# Patient Record
Sex: MALE | Race: BLACK OR AFRICAN AMERICAN | NOT HISPANIC OR LATINO | Employment: OTHER | URBAN - METROPOLITAN AREA
[De-identification: names, ages, dates, MRNs, and addresses within clinical notes are randomized per-mention and may not be internally consistent; named-entity substitution may affect disease eponyms.]

---

## 2018-07-11 LAB — CRC RECOMMENDATION EXT: NORMAL

## 2019-01-15 ENCOUNTER — CLINICAL SUPPORT (OUTPATIENT)
Dept: URGENT CARE | Facility: CLINIC | Age: 64
End: 2019-01-15
Payer: OTHER GOVERNMENT

## 2019-01-15 ENCOUNTER — OFFICE VISIT (OUTPATIENT)
Dept: URGENT CARE | Facility: CLINIC | Age: 64
End: 2019-01-15
Payer: OTHER GOVERNMENT

## 2019-01-15 VITALS
SYSTOLIC BLOOD PRESSURE: 138 MMHG | OXYGEN SATURATION: 100 % | WEIGHT: 169 LBS | DIASTOLIC BLOOD PRESSURE: 86 MMHG | HEIGHT: 68 IN | RESPIRATION RATE: 16 BRPM | TEMPERATURE: 98 F | HEART RATE: 67 BPM | BODY MASS INDEX: 25.61 KG/M2

## 2019-01-15 DIAGNOSIS — R11.0 NAUSEA: Primary | ICD-10-CM

## 2019-01-15 DIAGNOSIS — Z23 INFLUENZA VACCINATION GIVEN: ICD-10-CM

## 2019-01-15 PROCEDURE — 99204 PR OFFICE/OUTPT VISIT, NEW, LEVL IV, 45-59 MIN: ICD-10-PCS | Mod: S$GLB,,, | Performed by: NURSE PRACTITIONER

## 2019-01-15 PROCEDURE — 90471 IMMUNIZATION ADMIN: CPT | Mod: S$GLB,,, | Performed by: NURSE PRACTITIONER

## 2019-01-15 PROCEDURE — 90471 PR IMMUNIZ ADMIN,1 SINGLE/COMB VAC/TOXOID: ICD-10-PCS | Mod: S$GLB,,, | Performed by: NURSE PRACTITIONER

## 2019-01-15 PROCEDURE — 90658 IIV3 VACCINE SPLT 0.5 ML IM: CPT | Mod: S$GLB,,, | Performed by: NURSE PRACTITIONER

## 2019-01-15 PROCEDURE — 90658 PR FLU VACCINE, >3 YRS, IM: ICD-10-PCS | Mod: S$GLB,,, | Performed by: NURSE PRACTITIONER

## 2019-01-15 PROCEDURE — 99204 OFFICE O/P NEW MOD 45 MIN: CPT | Mod: S$GLB,,, | Performed by: NURSE PRACTITIONER

## 2019-01-15 RX ORDER — ONDANSETRON 4 MG/1
4 TABLET, ORALLY DISINTEGRATING ORAL EVERY 8 HOURS PRN
Qty: 15 TABLET | Refills: 0 | Status: SHIPPED | OUTPATIENT
Start: 2019-01-15 | End: 2022-01-12 | Stop reason: ALTCHOICE

## 2019-01-15 RX ORDER — OMEPRAZOLE 20 MG/1
20 CAPSULE, DELAYED RELEASE ORAL DAILY
Qty: 30 CAPSULE | Refills: 1 | Status: SHIPPED | OUTPATIENT
Start: 2019-01-15 | End: 2022-01-12 | Stop reason: ALTCHOICE

## 2019-01-15 NOTE — PROGRESS NOTES
"Subjective:       Patient ID: Dustin Owens is a 63 y.o. male.    Vitals:  height is 5' 8" (1.727 m) and weight is 76.7 kg (169 lb). His oral temperature is 97.7 °F (36.5 °C). His blood pressure is 138/86 and his pulse is 67. His respiration is 16 and oxygen saturation is 100%.     Chief Complaint: Nausea    Dustin Owens is a 63 year old male presenting with a one month history of nausea that only occurs after eating. He denies abdominal pain. He has had no vomiting. He has taken no medications for his symptoms. He notices the nausea mostly with fried/fatty foods or when he has eaten after a long fast. He does not report a chronic history of NSAID use.       Nausea   This is a new problem. Associated symptoms include nausea. Pertinent negatives include no abdominal pain, chills, fever or vomiting. He has tried nothing for the symptoms.       Constitution: Negative for chills and fever.   HENT: Negative.    Neck: negative.   Cardiovascular: Negative.    Eyes: Negative.    Respiratory: Negative.    Gastrointestinal: Positive for nausea. Negative for abdominal pain, abdominal bloating, vomiting, diarrhea and heartburn.   Endocrine: negative.   Genitourinary: Negative.    Musculoskeletal: Negative.    Skin: Negative.        Objective:      Physical Exam   Constitutional: He is oriented to person, place, and time. He appears well-developed and well-nourished. He is cooperative.  Non-toxic appearance. He does not appear ill. No distress.   HENT:   Head: Normocephalic and atraumatic.   Right Ear: Hearing, tympanic membrane, external ear and ear canal normal.   Left Ear: Hearing, tympanic membrane, external ear and ear canal normal.   Nose: Nose normal. No mucosal edema, rhinorrhea or nasal deformity. No epistaxis. Right sinus exhibits no maxillary sinus tenderness and no frontal sinus tenderness. Left sinus exhibits no maxillary sinus tenderness and no frontal sinus tenderness.   Mouth/Throat: Uvula is midline, " oropharynx is clear and moist and mucous membranes are normal. No trismus in the jaw. Normal dentition. No uvula swelling. No posterior oropharyngeal erythema.   Eyes: Conjunctivae and lids are normal. Right eye exhibits no discharge. Left eye exhibits no discharge. No scleral icterus.   Sclera clear bilat   Neck: Trachea normal, normal range of motion, full passive range of motion without pain and phonation normal. Neck supple.   Cardiovascular: Normal rate, regular rhythm, normal heart sounds, intact distal pulses and normal pulses.   Pulmonary/Chest: Effort normal and breath sounds normal. No respiratory distress.   Abdominal: Soft. Normal appearance and bowel sounds are normal. He exhibits no distension, no pulsatile midline mass and no mass. There is no tenderness. There is no guarding.   Musculoskeletal: Normal range of motion. He exhibits no edema or deformity.   Neurological: He is alert and oriented to person, place, and time. He exhibits normal muscle tone. Coordination normal.   Skin: Skin is warm, dry and intact. He is not diaphoretic. No pallor.   Psychiatric: He has a normal mood and affect. His speech is normal and behavior is normal. Judgment and thought content normal. Cognition and memory are normal.   Nursing note and vitals reviewed.      Assessment:       1. Nausea        Plan:       Dustin Owens is a 63 year old male presenting to the clinic with a several week history of intermittent nausea, only occurring after eating. He has had no abdominal tenderness and has none on exam. No suspicion for choleducolithiasis, acute cholecystitis, pancreatitis,SBO, or other emergent cause. He will be started on PPI therapy for possible GERD and referred to GI for additional evaluation. Referral placed and patient provided with clinic phone number. No need for emergent imaging at this time. Based on my clinical evaluation, I do not appreciate any immediate, emergent, or life threatening condition or  etiology that warrants additional workup today and feel that the patient can be discharged with close follow up care.     Nausea  -     Ambulatory referral to Gastroenterology    Other orders  -     omeprazole (PRILOSEC) 20 MG capsule; Take 1 capsule (20 mg total) by mouth once daily.  Dispense: 30 capsule; Refill: 1  -     ondansetron (ZOFRAN ODT) 4 MG TbDL; Take 1 tablet (4 mg total) by mouth every 8 (eight) hours as needed.  Dispense: 15 tablet; Refill: 0

## 2019-01-15 NOTE — PATIENT INSTRUCTIONS
Call [phone] and request an appointment with gastroenterology. I have put a referral in the system for you.       Tips to Control Acid Reflux    To control acid reflux, youll need to make some basic diet and lifestyle changes. The simple steps outlined below may be all youll need to ease discomfort.  Watch what you eat  · Avoid fatty foods and spicy foods.  · Eat fewer acidic foods, such as citrus and tomato-based foods. These can increase symptoms.  · Limit drinking alcohol, caffeine, and fizzy beverages. All increase acid reflux.  · Try limiting chocolate, peppermint, and spearmint. These can worsen acid reflux in some people.  Watch when you eat  · Avoid lying down for 3 hours after eating.  · Do not snack before going to bed.  Raise your head  Raising your head and upper body by 4 to 6 inches helps limit reflux when youre lying down. Put blocks under the head of your bed frame to raise it.  Other changes  · Lose weight, if you need to  · Dont exercise near bedtime  · Avoid tight-fitting clothes  · Limit aspirin and ibuprofen  · Stop smoking   Date Last Reviewed: 7/1/2016  © 9113-7897 [employer]. [address] [place] [address], [place] [address]. All rights reserved. This information is not intended as a substitute for professional medical care. Always follow your healthcare professional's instructions.        GERD (Adult)    The esophagus is a tube that carries food from the mouth to the stomach. A valve at the lower end of the esophagus prevents stomach acid from flowing upward. When this valve doesn't work properly, stomach contents may repeatedly flow back up (reflux) into the esophagus. This is called gastroesophageal reflux disease (GERD). GERD can irritate the esophagus. It can cause problems with swallowing or breathing. In severe cases, GERD can cause recurrent pneumonia or other serious problems.  Symptoms of reflux include burning, pressure or sharp pain in the upper abdomen or mid to  "lower chest. The pain can spread to the neck, back, or shoulder. There may be belching, an acid taste in the back of the throat, chronic cough, or sore throat or hoarseness. GERD symptoms often occur during the day after a big meal. They can also occur at night when lying down.   Home care  Lifestyle changes can help reduce symptoms. If needed, medicines may be prescribed. Symptoms often improve with treatment, but if treatment is stopped, the symptoms often return after a few months. So most persons with GERD will need to continue treatment.  Lifestyle changes  · Limit or avoid fatty, fried, and spicy foods, as well as coffee, chocolate, mint, and foods with high acid content such as tomatoes and citrus fruit and juices (orange, grapefruit, lemon).  · Dont eat large meals, especially at night. Frequent, smaller meals are best. Do not lie down right after eating. And dont eat anything 3 hours before going to bed.  · Avoid drinking alcohol and smoking. As much as possible, stay away from second hand smoke.  · If you are overweight, losing weight will reduce symptoms.   · Avoid wearing tight clothing around your stomach area.  · If your symptoms occur during sleep, use a foam wedge to elevate your upper body (not just your head.) Or, place 4" blocks under the head of your bed.  Medicines  If needed, medicines can help relieve the symptoms of GERD and prevent damage to the esophagus. Discuss a medicine plan with your healthcare provider. This may include one or more of the following medicines:  · Antacids to help neutralize the normal acids in your stomach.  · Acid blockers (H2 blockers) to decrease acid production.  · Acid inhibitors (PPIs) to decrease acid production in a different way than the blockers. They may work better, but can take a little longer to take effect.  Take an antacid 30-60 minutes after eating and at bedtime, but not at the same time as an acid blocker.  Try not to take medicines such as " ibuprofen and aspirin. If you are taking aspirin for your heart or other medical reasons, talk to your healthcare provider about stopping it.  Follow-up care  Follow up with your healthcare provider or as advised by our staff.  When to seek medical advice  Call your healthcare provider if any of the following occur:  · Stomach pain gets worse or moves to the lower right abdomen (appendix area)  · Chest pain appears or gets worse, or spreads to the back, neck, shoulder, or arm  · Frequent vomiting (cant keep down liquids)  · Blood in the stool or vomit (red or black in color)  · Feeling weak or dizzy  · Fever of 100.4ºF (38ºC) or higher, or as directed by your healthcare provider  Date Last Reviewed: 6/23/2015  © 6065-0653 [employer]. [address] [place] [address], [place] [address]. All rights reserved. This information is not intended as a substitute for professional medical care. Always follow your healthcare professional's instructions.

## 2019-08-27 ENCOUNTER — HOSPITAL ENCOUNTER (OUTPATIENT)
Dept: RADIOLOGY | Facility: HOSPITAL | Age: 64
Discharge: HOME | End: 2019-08-27
Attending: FAMILY MEDICINE
Payer: OTHER GOVERNMENT

## 2019-08-27 DIAGNOSIS — M54.2 NECK PAIN: ICD-10-CM

## 2019-08-27 DIAGNOSIS — M54.2 CERVICALGIA: Primary | ICD-10-CM

## 2019-08-27 DIAGNOSIS — M54.2 NECK PAIN: Primary | ICD-10-CM

## 2019-08-27 PROCEDURE — 72050 X-RAY EXAM NECK SPINE 4/5VWS: CPT | Mod: TC,PO

## 2019-12-27 ENCOUNTER — CLINICAL SUPPORT (OUTPATIENT)
Dept: URGENT CARE | Facility: CLINIC | Age: 64
End: 2019-12-27
Payer: OTHER GOVERNMENT

## 2019-12-27 VITALS
HEART RATE: 76 BPM | RESPIRATION RATE: 18 BRPM | WEIGHT: 167 LBS | SYSTOLIC BLOOD PRESSURE: 131 MMHG | TEMPERATURE: 97 F | BODY MASS INDEX: 25.31 KG/M2 | DIASTOLIC BLOOD PRESSURE: 84 MMHG | HEIGHT: 68 IN | OXYGEN SATURATION: 99 %

## 2019-12-27 DIAGNOSIS — J01.90 ACUTE NON-RECURRENT SINUSITIS, UNSPECIFIED LOCATION: Primary | ICD-10-CM

## 2019-12-27 DIAGNOSIS — J40 BRONCHITIS: ICD-10-CM

## 2019-12-27 PROCEDURE — 99204 OFFICE O/P NEW MOD 45 MIN: CPT | Mod: 25,S$GLB,, | Performed by: NURSE PRACTITIONER

## 2019-12-27 PROCEDURE — 96372 THER/PROPH/DIAG INJ SC/IM: CPT | Mod: S$GLB,,, | Performed by: NURSE PRACTITIONER

## 2019-12-27 PROCEDURE — 99204 PR OFFICE/OUTPT VISIT, NEW, LEVL IV, 45-59 MIN: ICD-10-PCS | Mod: 25,S$GLB,, | Performed by: NURSE PRACTITIONER

## 2019-12-27 PROCEDURE — 96372 PR INJECTION,THERAP/PROPH/DIAG2ST, IM OR SUBCUT: ICD-10-PCS | Mod: S$GLB,,, | Performed by: NURSE PRACTITIONER

## 2019-12-27 RX ORDER — DEXAMETHASONE SODIUM PHOSPHATE 4 MG/ML
8 INJECTION, SOLUTION INTRA-ARTICULAR; INTRALESIONAL; INTRAMUSCULAR; INTRAVENOUS; SOFT TISSUE
Status: COMPLETED | OUTPATIENT
Start: 2019-12-27 | End: 2019-12-27

## 2019-12-27 RX ORDER — CETIRIZINE HYDROCHLORIDE 10 MG/1
10 TABLET ORAL DAILY
Qty: 30 TABLET | Refills: 0 | Status: SHIPPED | OUTPATIENT
Start: 2019-12-27 | End: 2020-01-26

## 2019-12-27 RX ORDER — PREDNISONE 20 MG/1
40 TABLET ORAL DAILY
Qty: 10 TABLET | Refills: 0 | Status: SHIPPED | OUTPATIENT
Start: 2019-12-27 | End: 2020-01-01

## 2019-12-27 RX ORDER — FLUTICASONE PROPIONATE 50 MCG
2 SPRAY, SUSPENSION (ML) NASAL DAILY
Qty: 15.8 ML | Refills: 0 | Status: SHIPPED | OUTPATIENT
Start: 2019-12-27 | End: 2021-07-28

## 2019-12-27 RX ORDER — AMOXICILLIN 875 MG/1
875 TABLET, FILM COATED ORAL 2 TIMES DAILY
Qty: 14 TABLET | Refills: 0 | Status: SHIPPED | OUTPATIENT
Start: 2019-12-27 | End: 2020-01-03

## 2019-12-27 RX ADMIN — DEXAMETHASONE SODIUM PHOSPHATE 8 MG: 4 INJECTION, SOLUTION INTRA-ARTICULAR; INTRALESIONAL; INTRAMUSCULAR; INTRAVENOUS; SOFT TISSUE at 03:12

## 2019-12-27 NOTE — PATIENT INSTRUCTIONS
What Is Acute Bronchitis?  Acute bronchitis is when the airways in your lungs (bronchial tubes) become red and swollen (inflamed). It is usually caused by a viral infection. But it can also occur because of a bacteria or allergen. Symptoms include a cough that produces yellow or greenish mucus and can last for days or sometimes weeks.  Inside healthy lungs    Air travels in and out of the lungs through the airways. The linings of these airways produce sticky mucus. This mucus traps particles that enter the lungs. Tiny structures called cilia then sweep the particles out of the airways.     Healthy airway: Airways are normally open. Air moves in and out easily.      Healthy cilia: Tiny, hairlike cilia sweep mucus and particles up and out of the airways.   Lungs with bronchitis  Bronchitis often occurs with a cold or the flu virus. The airways become inflamed (red and swollen). There is a deep hacking cough from the extra mucus. Other symptoms may include:  · Wheezing  · Chest discomfort  · Shortness of breath  · Mild fever  A second infection, this time due to bacteria, may then occur. And airways irritated by allergens or smoke are more likely to get infected.        Inflamed airway: Inflammation and extra mucus narrow the airway, causing shortness of breath.      Impaired cilia: Extra mucus impairs cilia, causing congestion and wheezing. Smoking makes the problem worse.   Making a diagnosis  A physical exam, health history, and certain tests help your healthcare provider make the diagnosis.  Health history  Your healthcare provider will ask you about your symptoms.  The exam  Your provider listens to your chest for signs of congestion. He or she may also check your ears, nose, and throat.  Possible tests  · A sputum test for bacteria. This requires a sample of mucus from your lungs.  · A nasal or throat swab. This tests to see if you have a bacterial infection.  · A chest X-ray. This is done if your healthcare  provider thinks you have pneumonia.  · Tests to check for an underlying condition. Other tests may be done to check for things such as allergies, asthma, or COPD (chronic obstructive pulmonary disease). You may need to see a specialist for more lung function testing.  Treating a cough  The main treatment for bronchitis is easing symptoms. Avoiding smoke, allergens, and other things that trigger coughing can often help. If the infection is bacterial, you may be given antibiotics. During the illness, it's important to get plenty of sleep. To ease symptoms:  · Dont smoke. Also avoid secondhand smoke.  · Use a humidifier. Or try breathing in steam from a hot shower. This may help loosen mucus.  · Drink a lot of water and juice. They can soothe the throat and may help thin mucus.  · Sit up or use extra pillows when in bed. This helps to lessen coughing and congestion.  · Ask your provider about using medicine. Ask about using cough medicine, pain and fever medicine, or a decongestant.  Antibiotics  Most cases of bronchitis are caused by cold or flu viruses. They dont need antibiotics to treat them, even if your mucus is thick and green or yellow. Antibiotics dont treat viral illness and antibiotics have not been shown to have any benefit in cases of acute bronchitis. Taking antibiotics when they are not needed increases your risk of getting an infection later that is antibiotic-resistant. Antibiotics can also cause severe cases of diarrhea that require other antibiotics to treat.  It is important that you accept your healthcare provider's opinion to not use antibiotics. Your provider will prescribe antibiotics if the infection is caused by bacteria. If they are prescribed:  · Take all of the medicine. Take the medicine until it is used up, even if symptoms have improved. If you dont, the bronchitis may come back.  · Take the medicines as directed. For instance, some medicines should be taken with food.  · Ask about  side effects. Ask your provider or pharmacist what side effects are common, and what to do about them.  Follow-up care  You should see your provider again in 2 to 3 weeks. By this time, symptoms should have improved. An infection that lasts longer may mean you have a more serious problem.  Prevention  · Avoid tobacco smoke. If you smoke, quit. Stay away from smoky places. Ask friends and family not to smoke around you, or in your home or car.  · Get checked for allergies.  · Ask your provider about getting a yearly flu shot. Also ask about pneumococcal or pneumonia shots.  · Wash your hands often. This helps reduce the chance of picking up viruses that cause colds and flu.  Call your healthcare provider if:  · Symptoms worsen, or you have new symptoms  · Breathing problems worsen or  become severe  · Symptoms dont get better within a week, or within 3 days of taking antibiotics   Date Last Reviewed: 2/1/2017  © 4514-9317 [employer]. [address] [place] [address]. All rights reserved. This information is not intended as a substitute for professional medical care. Always follow your healthcare professional's instructions.        Acute Sinusitis    Acute sinusitis is irritation and swelling of the sinuses. It is usually caused by a viral infection after a common cold. Your doctor can help you find relief.  What is acute sinusitis?  Sinuses are air-filled spaces in the skull behind the face. They are kept moist and clean by a lining of mucosa. Things such as pollen, smoke, and chemical fumes can irritate the mucosa. It can then swell up. As a response to irritation, the mucosa makes more mucus and other fluids. Tiny hairlike cilia cover the mucosa. Cilia help carry mucus toward the opening of the sinus. Too much mucus may cause the cilia to stop working. This blocks the sinus opening. A buildup of fluid in the sinuses then causes pain and pressure. It can also encourage bacteria to grow in  the sinuses.  Common symptoms of acute sinusitis  You may have:  · Facial soreness pain  · Headache  · Fever  · Fluid draining in the back of the throat (postnasal drip)  · Congestion  · Drainage that is thick and colored, instead of clear  · Cough  Diagnosing acute sinusitis  Your doctor will ask about your symptoms and health history. He or she will look at your ear, nose, and throat. You usually won't need to have X-rays taken.    The doctor may take a sample of mucus to check for bacteria. If you have sinusitis that keeps coming back, you may need imaging tests such as X-rays or CAT scans. This will help your doctor check for a structural problem that may be causing the infection.  Treating acute sinusitis  Treatment is aimed at unblocking the sinus opening and helping the cilia work again. You may need to take antihistamine and decongestant medicine. These can reduce inflammation and decrease the amount of fluid your sinuses make. If you have a bacterial infection, you will need to take antibiotic medicine for 10 to 14 days. Take this medicine until it is gone, even if you feel better.  Date Last Reviewed: 10/1/2016  © 1222-7310 [employer]. [address] [place], [place] [id number]. All rights reserved. This information is not intended as a substitute for professional medical care. Always follow your healthcare professional's instructions.        Self-Care for Sinusitis     Drinking plenty of water can help sinuses drain.   Sinusitis can often be managed with self-care. Self-care can keep sinuses moist and make you feel more comfortable. Remember to follow your doctor's instructions closely. This can make a big difference in getting your sinus problem under control.  Drink fluids  Drinking extra fluids helps thin your mucus. This lets it drain from your sinuses more easily. Have a glass of water every hour or two. A humidifier helps in much the same way. Fluids can also offset the drying effects  of certain medicines. If you use a humidifier, follow the product maker's instructions on how to use it. Clean it on a regular schedule.  Use saltwater rinses  Rinses help keep your sinuses and nose moist. Mix a teaspoon of salt in 8 ounces of fresh, warm water. Use a bulb syringe to gently squirt the water into your nose a few times a day. You can also buy ready-made saline nasal sprays.  Apply hot or cold packs  Applying heat to the area surrounding your sinuses may make you feel more comfortable. Use a hot water bottle or a hand towel dipped in hot water. Some people also find ice packs effective for relieving pain.  Medicines  Your doctor may prescribe medications to help treat your sinusitis. If you have an infection, antibiotics can help clear it up. If you are prescribed antibiotics, take all pills on schedule until they are gone, even if you feel better. Decongestants help relieve swelling. Use decongestant sprays for short periods only under the direction of your doctor. If you have allergies, your doctor may prescribe medications to help relieve them.   Date Last Reviewed: 10/1/2016  © 7553-7047 [employer]. [address] [place] [address], [place] [address]. All rights reserved. This information is not intended as a substitute for professional medical care. Always follow your healthcare professional's instructions.

## 2019-12-27 NOTE — PROGRESS NOTES
"Subjective:       Patient ID: Dustin Owens is a 64 y.o. male.    Vitals:  height is 5' 8" (1.727 m) and weight is 75.8 kg (167 lb). His oral temperature is 97.4 °F (36.3 °C). His blood pressure is 131/84 and his pulse is 76. His respiration is 18 and oxygen saturation is 99%.     Chief Complaint: Sinus Problem (X 2 weeks)    Patient complains of sinus congestion, facial pressure, and cough for 2 weeks. Denies fever, sob, chest pain, nausea, vomiting, diarrhea.     Sinus Problem   This is a new problem. The current episode started 1 to 4 weeks ago. The problem has been gradually worsening since onset. There has been no fever. Associated symptoms include congestion, coughing, headaches and sinus pressure. Pertinent negatives include no chills, shortness of breath or sore throat. Past treatments include nothing. The treatment provided no relief.       Constitution: Negative for chills, fatigue and fever.   HENT: Positive for congestion and sinus pressure. Negative for sore throat.    Neck: Negative for painful lymph nodes.   Cardiovascular: Negative for chest pain and leg swelling.   Eyes: Negative for double vision and blurred vision.   Respiratory: Positive for cough. Negative for shortness of breath.    Gastrointestinal: Negative for abdominal pain, nausea, vomiting and diarrhea.   Endocrine: negative.   Genitourinary: Negative for dysuria, frequency and urgency.   Musculoskeletal: Negative for joint pain, joint swelling, muscle cramps and muscle ache.   Skin: Negative for color change, pale and rash.   Allergic/Immunologic: Negative for seasonal allergies.   Neurological: Positive for headaches. Negative for dizziness, history of vertigo, light-headedness and passing out.   Hematologic/Lymphatic: Negative for swollen lymph nodes, easy bruising/bleeding and history of blood clots. Does not bruise/bleed easily.   Psychiatric/Behavioral: Negative for nervous/anxious, sleep disturbance and depression. The patient " is not nervous/anxious.        Objective:      Physical Exam   Constitutional: He is oriented to person, place, and time. Vital signs are normal. He appears well-developed and well-nourished. He is cooperative.  Non-toxic appearance. He does not have a sickly appearance. He does not appear ill. No distress.   HENT:   Head: Normocephalic and atraumatic.   Right Ear: Hearing, tympanic membrane, external ear and ear canal normal.   Left Ear: Hearing, tympanic membrane, external ear and ear canal normal.   Nose: Mucosal edema and rhinorrhea present. No nasal deformity. No epistaxis. Right sinus exhibits maxillary sinus tenderness and frontal sinus tenderness. Left sinus exhibits maxillary sinus tenderness and frontal sinus tenderness.   Mouth/Throat: Uvula is midline and mucous membranes are normal. No trismus in the jaw. Normal dentition. No uvula swelling. Posterior oropharyngeal erythema present. No oropharyngeal exudate or posterior oropharyngeal edema.   Eyes: Conjunctivae and lids are normal. Right eye exhibits no discharge. Left eye exhibits no discharge. No scleral icterus.   Neck: Trachea normal, normal range of motion, full passive range of motion without pain and phonation normal. Neck supple.   Cardiovascular: Normal rate, regular rhythm, normal heart sounds, intact distal pulses and normal pulses.   Pulmonary/Chest: Effort normal and breath sounds normal. No respiratory distress.   Abdominal: Soft. Normal appearance and bowel sounds are normal. He exhibits no distension, no pulsatile midline mass and no mass. There is no tenderness.   Musculoskeletal: Normal range of motion. He exhibits no edema or deformity.   Lymphadenopathy:     He has no cervical adenopathy.   Neurological: He is alert and oriented to person, place, and time. He exhibits normal muscle tone. Coordination normal. GCS eye subscore is 4. GCS verbal subscore is 5. GCS motor subscore is 6.   Skin: Skin is warm, dry, intact, not diaphoretic  and not pale.   Psychiatric: He has a normal mood and affect. His speech is normal and behavior is normal. Judgment and thought content normal. Cognition and memory are normal.   Nursing note and vitals reviewed.        Assessment:       1. Acute non-recurrent sinusitis, unspecified location    2. Bronchitis        Plan:       Due to patient presentation as well as length of symptoms, will treat for bacterial sinusitis. Patient is being discharged home. Discussed reasons to return and importance of followup. All questions addressed and patient given discharge instructions and followup information.    Lungs clear throughout, sats 99% , and no respiratory distress.  I do not suspect pneumonia, pulmonary embolism or any other more serious condition requiring further treatment. Advised pt to return to clinic or go to ER for any worsening of symptoms. Based on my clinical evaluation, I do not appreciate any immediate, emergent, or life threatening condition or etiology that warrants additional workup today and feel that the patient can be discharged with close follow up care.    Acute non-recurrent sinusitis, unspecified location    Bronchitis    Other orders  -     dexamethasone injection 8 mg  -     amoxicillin (AMOXIL) 875 MG tablet; Take 1 tablet (875 mg total) by mouth 2 (two) times daily. for 7 days  Dispense: 14 tablet; Refill: 0  -     cetirizine (ZYRTEC) 10 MG tablet; Take 1 tablet (10 mg total) by mouth once daily.  Dispense: 30 tablet; Refill: 0  -     fluticasone propionate (FLONASE) 50 mcg/actuation nasal spray; 2 sprays (100 mcg total) by Each Nostril route once daily.  Dispense: 15.8 mL; Refill: 0  -     predniSONE (DELTASONE) 20 MG tablet; Take 2 tablets (40 mg total) by mouth once daily. for 5 days  Dispense: 10 tablet; Refill: 0  -     dexchlorphen-phenylephrine-DM (POLYTUSSIN DM) 1-5-10 mg/5 mL Syrp; Take 5 mLs by mouth every 4 (four) hours as needed.  Dispense: 120 mL; Refill: 0

## 2020-01-05 ENCOUNTER — HOSPITAL ENCOUNTER (EMERGENCY)
Facility: HOSPITAL | Age: 65
Discharge: HOME | End: 2020-01-05
Attending: EMERGENCY MEDICINE
Payer: OTHER GOVERNMENT

## 2020-01-05 VITALS
SYSTOLIC BLOOD PRESSURE: 126 MMHG | OXYGEN SATURATION: 98 % | DIASTOLIC BLOOD PRESSURE: 62 MMHG | WEIGHT: 168 LBS | HEART RATE: 80 BPM | TEMPERATURE: 98 F | BODY MASS INDEX: 24.88 KG/M2 | HEIGHT: 69 IN | RESPIRATION RATE: 18 BRPM

## 2020-01-05 DIAGNOSIS — R51.9 ACUTE NONINTRACTABLE HEADACHE, UNSPECIFIED HEADACHE TYPE: Primary | ICD-10-CM

## 2020-01-05 DIAGNOSIS — M54.2 NECK PAIN: ICD-10-CM

## 2020-01-05 PROCEDURE — 96372 THER/PROPH/DIAG INJ SC/IM: CPT

## 2020-01-05 PROCEDURE — 63600175 PHARM REV CODE 636 W HCPCS: Performed by: EMERGENCY MEDICINE

## 2020-01-05 PROCEDURE — 99284 EMERGENCY DEPT VISIT MOD MDM: CPT | Mod: 25

## 2020-01-05 RX ORDER — KETOROLAC TROMETHAMINE 30 MG/ML
30 INJECTION, SOLUTION INTRAMUSCULAR; INTRAVENOUS
Status: COMPLETED | OUTPATIENT
Start: 2020-01-05 | End: 2020-01-05

## 2020-01-05 RX ORDER — ORPHENADRINE CITRATE 30 MG/ML
30 INJECTION INTRAMUSCULAR; INTRAVENOUS
Status: COMPLETED | OUTPATIENT
Start: 2020-01-05 | End: 2020-01-05

## 2020-01-05 RX ADMIN — ORPHENADRINE CITRATE 30 MG: 30 INJECTION INTRAMUSCULAR; INTRAVENOUS at 10:01

## 2020-01-05 RX ADMIN — KETOROLAC TROMETHAMINE 30 MG: 30 INJECTION, SOLUTION INTRAMUSCULAR at 10:01

## 2020-01-05 NOTE — ED PROVIDER NOTES
Encounter Date: 1/5/2020    SCRIBE #1 NOTE: I, Yajaira Belle, am scribing for, and in the presence of, Ron Green MD.       History     Chief Complaint   Patient presents with    Headache       Time seen by provider: 9:59 AM on 01/05/2020    Dustin Owens is a 64 y.o. male with PMHx of asthma and HLD who presents to the ED with a gradual onset of HA and neck pain. Pt was treated 2 weeks ago for possible sinusitis with a course of Amoxicillin and steroids. Pt states he was laying on the couch when he woke up 4 days ago with HA and NP. HA has worsened with associated eye pain and NP occasionally radiates down right arm. He notes previous hx of neck problems. Additionally, he complains neck stiffness and sweats. He took Tylenol yesterday with little to no relief. The patient denies congestion, cough, fever, head injuries, joint pain or any other symptoms at this time. No PMHx of CA. No pertinent PSHx. NKDA.    The history is provided by the patient.     Review of patient's allergies indicates:  No Known Allergies  Past Medical History:   Diagnosis Date    Asthma      No past surgical history on file.  Family History   Problem Relation Age of Onset    Cancer Mother     Diabetes Father      Social History     Tobacco Use    Smoking status: Former Smoker   Substance Use Topics    Alcohol use: Not on file    Drug use: Not on file     Review of Systems   Constitutional: Positive for diaphoresis. Negative for fever.   HENT: Negative for congestion and sore throat.    Eyes: Positive for pain.   Respiratory: Negative for cough and shortness of breath.    Cardiovascular: Negative for chest pain.   Gastrointestinal: Negative for nausea.   Genitourinary: Negative for dysuria.   Musculoskeletal: Positive for myalgias (right arm), neck pain and neck stiffness. Negative for arthralgias, back pain and joint swelling.   Skin: Negative for rash.   Neurological: Positive for headaches. Negative for weakness.    Hematological: Does not bruise/bleed easily.       Physical Exam     Initial Vitals [01/05/20 0952]   BP Pulse Resp Temp SpO2   126/62 80 18 98.2 °F (36.8 °C) 98 %      MAP       --         Physical Exam    Nursing note and vitals reviewed.  Constitutional: He appears well-developed and well-nourished. He is not diaphoretic. No distress.   HENT:   Head: Normocephalic and atraumatic.   Mouth/Throat: Oropharynx is clear and moist.   No sinus tenderness to palpation.   Eyes: Conjunctivae are normal.   Neck: Neck supple.   Mild reproducible left posterior lateral tenderness extending through trapezius. Pain primarily when turning head to left.    Cardiovascular: Normal rate, regular rhythm, normal heart sounds and intact distal pulses. Exam reveals no gallop and no friction rub.    No murmur heard.  Pulmonary/Chest: Breath sounds normal. He has no wheezes. He has no rhonchi. He has no rales.   Abdominal: Soft. He exhibits no distension. There is no tenderness.   Musculoskeletal: Normal range of motion.        Cervical back: He exhibits no tenderness.   No midline C-spine tenderness.   Neurological: He is alert and oriented to person, place, and time. He has normal strength. No cranial nerve deficit.   No focal neurological deficits noted.  Cranial nerves II-XII grossly intact.  5/5 strength and sensation bilateral upper and lower extremities.    Skin: No rash noted. No erythema.         ED Course   Procedures  Labs Reviewed - No data to display       Imaging Results    None          Medical Decision Making:   History:   Old Medical Records: I decided to obtain old medical records.            Scribe Attestation:   Scribe #1: I performed the above scribed service and the documentation accurately describes the services I performed. I attest to the accuracy of the note.    I, Dr. Ron Green, personally performed the services described in this documentation. All medical record entries made by the scribe were at my  direction and in my presence.  I have reviewed the chart and agree that the record reflects my personal performance and is accurate and complete. Ron Green MD.  1:45 PM 01/05/2020    Dustin Owens is a 64 y.o. male presenting with multiple complaints including 4 days of left-sided neck pain accompanied by headache. I doubt major vessel dissection.  The patient has a nonfocal neurological examination with no red flags.  I doubt acute spinal cord processes requiring further spinal imaging such as CT or MRI.  I doubt epidural abscesses, severe deep space infection, transverse myelitis, discitis, cauda equina syndrome, or other emergent conditions.  Headache may be tension variety in reaction to possible muscular etiology of neck pain. Symptomatic treatment given here with IM ketorolac and orphenadrine.  I have very low suspicion for alternative causes of headache such as intracranial hemorrhage, ischemic process, meningitis, idiopathic intracranial hypertension, or cavernous venous sinus thrombosis.  The patient has a non-focal neurological examination with history not consistent with emergent causes of headache warranting present therapeutic intervention.  I do not think further brain imaging or lumbar puncture are indicated at this time.  Outpatient follow-up for reassessment reviewed in detail.  Detailed return precautions otherwise reviewed as well.                      Clinical Impression:       ICD-10-CM ICD-9-CM   1. Acute nonintractable headache, unspecified headache type R51 784.0   2. Neck pain M54.2 723.1         Disposition:   Disposition: Discharged  Condition: Stable                     Ron Green MD  01/05/20 3125

## 2020-02-05 ENCOUNTER — CLINICAL SUPPORT (OUTPATIENT)
Dept: URGENT CARE | Facility: CLINIC | Age: 65
End: 2020-02-05
Payer: OTHER GOVERNMENT

## 2020-02-05 VITALS
HEIGHT: 68 IN | OXYGEN SATURATION: 99 % | BODY MASS INDEX: 25.61 KG/M2 | TEMPERATURE: 97 F | HEART RATE: 74 BPM | RESPIRATION RATE: 18 BRPM | WEIGHT: 169 LBS | DIASTOLIC BLOOD PRESSURE: 86 MMHG | SYSTOLIC BLOOD PRESSURE: 137 MMHG

## 2020-02-05 DIAGNOSIS — R21 RASH AND NONSPECIFIC SKIN ERUPTION: Primary | ICD-10-CM

## 2020-02-05 PROCEDURE — 99214 OFFICE O/P EST MOD 30 MIN: CPT | Mod: 25,S$GLB,, | Performed by: NURSE PRACTITIONER

## 2020-02-05 PROCEDURE — 96372 THER/PROPH/DIAG INJ SC/IM: CPT | Mod: S$GLB,,, | Performed by: NURSE PRACTITIONER

## 2020-02-05 PROCEDURE — 96372 PR INJECTION,THERAP/PROPH/DIAG2ST, IM OR SUBCUT: ICD-10-PCS | Mod: S$GLB,,, | Performed by: NURSE PRACTITIONER

## 2020-02-05 PROCEDURE — 99214 PR OFFICE/OUTPT VISIT, EST, LEVL IV, 30-39 MIN: ICD-10-PCS | Mod: 25,S$GLB,, | Performed by: NURSE PRACTITIONER

## 2020-02-05 RX ORDER — HYDROCORTISONE 25 MG/G
CREAM TOPICAL 2 TIMES DAILY
Qty: 28 G | Refills: 0 | Status: SHIPPED | OUTPATIENT
Start: 2020-02-05 | End: 2022-01-12 | Stop reason: ALTCHOICE

## 2020-02-05 RX ORDER — CETIRIZINE HYDROCHLORIDE 10 MG/1
10 TABLET ORAL DAILY
Qty: 30 TABLET | Refills: 0 | Status: SHIPPED | OUTPATIENT
Start: 2020-02-05 | End: 2021-07-28

## 2020-02-05 RX ORDER — PREDNISONE 20 MG/1
40 TABLET ORAL DAILY
Qty: 10 TABLET | Refills: 0 | Status: SHIPPED | OUTPATIENT
Start: 2020-02-05 | End: 2020-02-10

## 2020-02-05 RX ORDER — DEXAMETHASONE SODIUM PHOSPHATE 4 MG/ML
8 INJECTION, SOLUTION INTRA-ARTICULAR; INTRALESIONAL; INTRAMUSCULAR; INTRAVENOUS; SOFT TISSUE
Status: COMPLETED | OUTPATIENT
Start: 2020-02-05 | End: 2020-02-05

## 2020-02-05 RX ADMIN — DEXAMETHASONE SODIUM PHOSPHATE 8 MG: 4 INJECTION, SOLUTION INTRA-ARTICULAR; INTRALESIONAL; INTRAMUSCULAR; INTRAVENOUS; SOFT TISSUE at 11:02

## 2020-02-05 NOTE — PATIENT INSTRUCTIONS
Self-Care for Skin Rashes     Pat your skin dry. Do not rub.     When your skin reacts to a substance your body is sensitive to, it can cause a rash. You can treat most rashes at home by keeping the skin clean and dry. Many rashes may get better on their own within 2 to 3 days. You may need medical attention if your rash itches, drains, or hurts, particularly if the rash is getting worse.  What can cause a skin rash?  · Sun poisoning, caused by too much exposure to the sun  · An irritant or allergic reaction to a certain type of food, plant, or chemical, such as  shellfish, poison ivy, and or cleaning products  · An infection caused by a fungus (ringworm), virus (chickenpox), or bacteria (strep)  · Bites or infestation caused by insects or pests, such as ticks, lice, or mites  · Dry skin, which is often seen during the winter months and in older people  How can I control itching and skin damage?  · Take soothing lukewarm baths in a colloidal oatmeal product. You can buy this at the [employer].  · Do your best not to scratch. Clip fingernails short, especially in young children, to reduce skin damage if scratching does occur.  · Use moisturizing skin lotion instead of scratching your dry skin.  · Use sunscreen whenever going out into direct sun.  · Use only mild cleansing agents whenever possible.  · Wash with mild, nonirritating soap and warm water.  · Wear clothing that breathes, such as cotton shirts or canvas shoes.  · If fluid is seeping from the rash, cover it loosely with clean gauze to absorb the discharge.  · Many rashes are contagious. Prevent the rash from spreading to others by washing your hands often before or after touching others with any skin rash.  Use medicine  · Antihistamines such as diphenhydramine can help control itching. But use with caution because they can make you drowsy.  · Using over-the-counter hydrocortisone cream on small rashes may help reduce swelling and itching  · Most  over-the-counter antifungal medicines can treat athletes foot and many other fungal infections of the skin.  Check with your healthcare provider  Call your healthcare provider if:  · You were told that you have a fungal infection on your skin to make sure you have the correct type of medicine.  · You have questions or concerns about medicines or their side effects.     Call 911  Call 911 if either of these occur:  · Your tongue or lips start to swell  · You have difficulty breathing      Call your healthcare provider  Call your healthcare provider if any of these occur:  · Temperature of more than 101.0°F (38.3°C), or as directed  · Sore throat, a cough, or unusual fatigue  · Red, oozy, or painful rash gets worse. These are signs of infection.  · Rash covers your face, genitals, or most of your body  · Crusty sores or red rings that begin to spread  · You were exposed to someone who has a contagious rash, such as scabies or lice.  · Red bulls-eye rash with a white center (a sign of Lyme disease)  · You were told that you have resistant bacteria (MRSA) on your skin.   Date Last Reviewed: 5/12/2015  © 2477-8554 [employer]. [address] [place] [address], [place] [address]. All rights reserved. This information is not intended as a substitute for professional medical care. Always follow your healthcare professional's instructions.

## 2020-02-05 NOTE — PROGRESS NOTES
"Subjective:       Patient ID: Dustin Owens is a 64 y.o. male.    Vitals:  height is 5' 8" (1.727 m) and weight is 76.7 kg (169 lb). His oral temperature is 97.1 °F (36.2 °C). His blood pressure is 137/86 and his pulse is 74. His respiration is 18 and oxygen saturation is 99%.     Chief Complaint: Rash    Patient complains of itching rash to his left buttock for 3 weeks. Denies any new lotions, soaps or detergents.     Rash   This is a new problem. The current episode started 1 to 4 weeks ago. The problem has been gradually worsening since onset. The rash is diffuse. He was exposed to a new detergent/soap. Pertinent negatives include no congestion, cough, diarrhea, fatigue, fever, shortness of breath, sore throat or vomiting. Past treatments include nothing. The treatment provided no relief.       Constitution: Negative for chills, fatigue and fever.   HENT: Negative for congestion and sore throat.    Neck: Negative for painful lymph nodes.   Cardiovascular: Negative for chest pain and leg swelling.   Eyes: Negative for double vision and blurred vision.   Respiratory: Negative for cough and shortness of breath.    Gastrointestinal: Negative for nausea, vomiting and diarrhea.   Genitourinary: Negative for dysuria, frequency and urgency.   Musculoskeletal: Negative for joint pain, joint swelling, muscle cramps and muscle ache.   Skin: Positive for rash. Negative for color change and pale.   Allergic/Immunologic: Negative for seasonal allergies.   Neurological: Negative for dizziness, history of vertigo, light-headedness, passing out and headaches.   Hematologic/Lymphatic: Negative for swollen lymph nodes, easy bruising/bleeding and history of blood clots. Does not bruise/bleed easily.   Psychiatric/Behavioral: Negative for nervous/anxious, sleep disturbance and depression. The patient is not nervous/anxious.        Objective:      Physical Exam   Constitutional: He is oriented to person, place, and time. He " appears well-developed and well-nourished. He is cooperative.  Non-toxic appearance. He does not appear ill. No distress.   HENT:   Head: Normocephalic and atraumatic.   Right Ear: Hearing, tympanic membrane, external ear and ear canal normal.   Left Ear: Hearing, tympanic membrane, external ear and ear canal normal.   Nose: Nose normal. No mucosal edema, rhinorrhea or nasal deformity. No epistaxis. Right sinus exhibits no maxillary sinus tenderness and no frontal sinus tenderness. Left sinus exhibits no maxillary sinus tenderness and no frontal sinus tenderness.   Mouth/Throat: Uvula is midline, oropharynx is clear and moist and mucous membranes are normal. No trismus in the jaw. Normal dentition. No uvula swelling. No posterior oropharyngeal erythema.   Eyes: Conjunctivae and lids are normal. Right eye exhibits no discharge. Left eye exhibits no discharge. No scleral icterus.   Neck: Trachea normal, normal range of motion, full passive range of motion without pain and phonation normal. Neck supple.   Cardiovascular: Normal rate, regular rhythm, normal heart sounds, intact distal pulses and normal pulses.   Pulmonary/Chest: Effort normal and breath sounds normal. No respiratory distress.   Abdominal: Soft. Normal appearance and bowel sounds are normal. He exhibits no distension, no pulsatile midline mass and no mass. There is no tenderness.   Musculoskeletal: Normal range of motion. He exhibits no edema or deformity.   Neurological: He is alert and oriented to person, place, and time. He exhibits normal muscle tone. Coordination normal.   Skin: Skin is warm, dry, intact, not diaphoretic and not pale.   Erythematous pruritic rash to left buttock.   Psychiatric: He has a normal mood and affect. His speech is normal and behavior is normal. Judgment and thought content normal. Cognition and memory are normal.   Nursing note and vitals reviewed.        Assessment:       1. Rash and nonspecific skin eruption        Plan:        This is a 64 y.o. male patient with presentation of rash. After my evaluation and workup, I believe this patient has a nonspecific rash, possibly ALLERGIC rash based upon history and physical. I do not believe the patient has a serious bacterial infection such as cellulitis, nor does the rash resemble serious condition such as Lyme disease, syphilis.  I also do not believe the patient has scabies or bedbugs based on history.  The patient was managed with steroids and antihistamines and they subsequently improved.  At the present time, the patient does not have any emergent condition requiring admission in the hospital.  Patient is stable for discharge. Results, clinical impression and rx discussed with patient. Pt to call for follow up with Ramiro Leung MD or referred physician in 2-3 days. Pt is to go to the ED immediately for any new or worsening symptoms.  Pt had time for ask questions to which they were addressed. Pt expressed understanding.      Rash and nonspecific skin eruption    Other orders  -     dexamethasone injection 8 mg  -     cetirizine (ZYRTEC) 10 MG tablet; Take 1 tablet (10 mg total) by mouth once daily.  Dispense: 30 tablet; Refill: 0  -     hydrocortisone 2.5 % cream; Apply topically 2 (two) times daily.  Dispense: 28 g; Refill: 0  -     predniSONE (DELTASONE) 20 MG tablet; Take 2 tablets (40 mg total) by mouth once daily. for 5 days  Dispense: 10 tablet; Refill: 0

## 2020-12-01 ENCOUNTER — OFFICE VISIT (OUTPATIENT)
Dept: ORTHOPEDICS | Facility: CLINIC | Age: 65
End: 2020-12-01
Payer: OTHER GOVERNMENT

## 2020-12-01 VITALS
DIASTOLIC BLOOD PRESSURE: 78 MMHG | HEIGHT: 69 IN | BODY MASS INDEX: 25.18 KG/M2 | SYSTOLIC BLOOD PRESSURE: 132 MMHG | WEIGHT: 170 LBS

## 2020-12-01 DIAGNOSIS — M75.41 IMPINGEMENT SYNDROME OF SHOULDER, RIGHT: Primary | ICD-10-CM

## 2020-12-01 DIAGNOSIS — M51.36 DISC DEGENERATION, LUMBAR: ICD-10-CM

## 2020-12-01 PROCEDURE — 99204 OFFICE O/P NEW MOD 45 MIN: CPT | Mod: 25,S$GLB,, | Performed by: ORTHOPAEDIC SURGERY

## 2020-12-01 PROCEDURE — 99204 PR OFFICE/OUTPT VISIT, NEW, LEVL IV, 45-59 MIN: ICD-10-PCS | Mod: 25,S$GLB,, | Performed by: ORTHOPAEDIC SURGERY

## 2020-12-01 PROCEDURE — 20610 DRAIN/INJ JOINT/BURSA W/O US: CPT | Mod: RT,S$GLB,, | Performed by: ORTHOPAEDIC SURGERY

## 2020-12-01 PROCEDURE — 20610 LARGE JOINT ASPIRATION/INJECTION: R SUBACROMIAL BURSA: ICD-10-PCS | Mod: RT,S$GLB,, | Performed by: ORTHOPAEDIC SURGERY

## 2020-12-01 RX ORDER — ESCITALOPRAM OXALATE 20 MG/1
20 TABLET ORAL DAILY
COMMUNITY
End: 2022-01-12 | Stop reason: SDUPTHER

## 2020-12-01 RX ORDER — METHYLPREDNISOLONE ACETATE 40 MG/ML
40 INJECTION, SUSPENSION INTRA-ARTICULAR; INTRALESIONAL; INTRAMUSCULAR; SOFT TISSUE
Status: DISCONTINUED | OUTPATIENT
Start: 2020-12-01 | End: 2020-12-01 | Stop reason: HOSPADM

## 2020-12-01 RX ADMIN — METHYLPREDNISOLONE ACETATE 40 MG: 40 INJECTION, SUSPENSION INTRA-ARTICULAR; INTRALESIONAL; INTRAMUSCULAR; SOFT TISSUE at 01:12

## 2020-12-01 NOTE — PROGRESS NOTES
[facility]    Subjective:     Chief Complaint:   Chief Complaint   Patient presents with    Right Shoulder - Pain     Right Shoulder pain for a few months no injury no fall   Lumbar Spine pain for many years in the morning he cannot bend to tie his shoes, muscles are tight as the day go the muslces r     Lower Back - Pain       Past Medical History:   Diagnosis Date    Asthma        No past surgical history on file.    Current Outpatient Medications   Medication Sig    escitalopram oxalate (LEXAPRO) 20 MG tablet Take 20 mg by mouth once daily.    cetirizine (ZYRTEC) 10 MG tablet Take 1 tablet (10 mg total) by mouth once daily.    dexchlorphen-phenylephrine-DM (POLYTUSSIN DM) 1-5-10 mg/5 mL Syrp Take 5 mLs by mouth every 4 (four) hours as needed. (Patient not taking: Reported on 2/5/2020)    fluticasone propionate (FLONASE) 50 mcg/actuation nasal spray 2 sprays (100 mcg total) by Each Nostril route once daily. (Patient not taking: Reported on 2/5/2020)    hydrocortisone 2.5 % cream Apply topically 2 (two) times daily. (Patient not taking: Reported on 12/1/2020)    omeprazole (PRILOSEC) 20 MG capsule Take 1 capsule (20 mg total) by mouth once daily. (Patient not taking: Reported on 12/27/2019)    ondansetron (ZOFRAN ODT) 4 MG TbDL Take 1 tablet (4 mg total) by mouth every 8 (eight) hours as needed. (Patient not taking: Reported on 12/27/2019)     No current facility-administered medications for this visit.        Review of patient's allergies indicates:  No Known Allergies    Family History   Problem Relation Age of Onset    Cancer Mother     Diabetes Father        Social History     Socioeconomic History    Marital status:      Spouse name: Not on file    Number of children: Not on file    Years of education: Not on file    Highest education level: Not on file   Occupational History    Not on file   Social Needs    Financial resource strain: Not on file    Food insecurity      Worry: Not on file     Inability: Not on file    Transportation needs     Medical: Not on file     Non-medical: Not on file   Tobacco Use    Smoking status: Former Smoker   Substance and Sexual Activity    Alcohol use: Not on file    Drug use: Not on file    Sexual activity: Not on file   Lifestyle    Physical activity     Days per week: Not on file     Minutes per session: Not on file    Stress: Not on file   Relationships    Social connections     Talks on phone: Not on file     Gets together: Not on file     Attends [employer] service: Not on file     Active member of club or organization: Not on file     Attends meetings of clubs or organizations: Not on file     Relationship status: Not on file   Other Topics Concern    Not on file   Social History Narrative    Not on file       History of present illness:  Patient comes in today for the right shoulder and for the low back.  In terms of the right shoulder that is been bothering him for about 2 months.  The low back it has been bothering him for years.  The back pain is primarily in the mornings.  It is severe at times.      Review of Systems:    Constitution: Negative for chills, fever, and sweats.  Negative for unexplained weight loss.    HENT:  Negative for headaches and blurry vision.    Cardiovascular:Negative for chest pain or irregular heart beat. Negative for hypertension.    Respiratory:  Negative for cough and shortness of breath.    Gastrointestinal: Negative for abdominal pain, heartburn, melena, nausea, and vomitting.    Genitourinary:  Negative bladder incontinence and dysuria.    Musculoskeletal:  See HPI for details.     Neurological: Negative for numbness.    Psychiatric/Behavioral: Negative for depression.  The patient is not nervous/anxious.      Endocrine: Negative for polyuria    Hematologic/Lymphatic: Negative for bleeding problem.  Does not bruise/bleed easily.    Skin: Negative for poor would healing and rash    Objective:       Physical Examination:    Vital Signs:    Vitals:    12/01/20 1311   BP: 132/78       Body mass index is 25.1 kg/m².    This a well-developed, well nourished patient in no acute distress.  They are alert and oriented and cooperative to examination.        Patient has full range of motion of the right shoulder.  Positive impingement.  Positive crossover.  His rotator cuff is weaker the contralateral side but intact to gravity and some downward force.    Full range of motion of the lumbar spine.  Negative straight leg raises EHL is intact.  Deep tendon reflexes are intact.  He can heel walk toe walk and squat.  Pertinent New Results:    XRAY Report / Interpretation:   AP and lateral of the right shoulder demonstrates a type 2 acromion no fracture subluxations or dislocations.    AP and lateral lumbar spine demonstrates very mild degenerative changes primarily at L4-5.    AP pelvis is within normal limits.    Assessment/Plan:      Right shoulder impingement syndrome.  I injected him with Depo-Medrol and lidocaine.    Lumbar degenerative mild.  I started him on physical therapy for shoulder and back.  He will follow-up in 6 weeks      This note was created using Dragon voice recognition software that occasionally misinterpreted phrases or words.

## 2020-12-01 NOTE — PROCEDURES
Large Joint Aspiration/Injection: R subacromial bursa    Date/Time: 12/1/2020 1:00 PM  Performed by: Fabian Arredondo MD  Authorized by: Fabian Arredondo MD     Consent Done?:  Yes (Verbal)  Indications:  Pain  Site marked: the procedure site was marked    Timeout: prior to procedure the correct patient, procedure, and site was verified    Prep: patient was prepped and draped in usual sterile fashion      Local anesthesia used?: Yes    Local anesthetic:  Lidocaine 1% without epinephrine  Ultrasonic Guidance for needle placement?: No    Location:  Shoulder  Site:  R subacromial bursa  Medications:  40 mg methylPREDNISolone acetate 40 mg/mL; 40 mg methylPREDNISolone acetate 40 mg/mL  Patient tolerance:  Patient tolerated the procedure well with no immediate complications

## 2021-01-12 ENCOUNTER — OFFICE VISIT (OUTPATIENT)
Dept: ORTHOPEDICS | Facility: CLINIC | Age: 66
End: 2021-01-12
Payer: OTHER GOVERNMENT

## 2021-01-12 VITALS
HEIGHT: 69 IN | BODY MASS INDEX: 25.18 KG/M2 | WEIGHT: 170 LBS | DIASTOLIC BLOOD PRESSURE: 68 MMHG | HEART RATE: 80 BPM | SYSTOLIC BLOOD PRESSURE: 94 MMHG

## 2021-01-12 DIAGNOSIS — M54.16 LUMBAR RADICULITIS: Primary | ICD-10-CM

## 2021-01-12 DIAGNOSIS — M75.41 IMPINGEMENT SYNDROME OF SHOULDER, RIGHT: ICD-10-CM

## 2021-01-12 DIAGNOSIS — M51.36 DISC DEGENERATION, LUMBAR: ICD-10-CM

## 2021-01-12 PROCEDURE — 99213 PR OFFICE/OUTPT VISIT, EST, LEVL III, 20-29 MIN: ICD-10-PCS | Mod: S$GLB,,, | Performed by: ORTHOPAEDIC SURGERY

## 2021-01-12 PROCEDURE — 99213 OFFICE O/P EST LOW 20 MIN: CPT | Mod: S$GLB,,, | Performed by: ORTHOPAEDIC SURGERY

## 2021-01-21 ENCOUNTER — HOSPITAL ENCOUNTER (OUTPATIENT)
Dept: RADIOLOGY | Facility: HOSPITAL | Age: 66
Discharge: HOME | End: 2021-01-21
Attending: ORTHOPAEDIC SURGERY
Payer: OTHER GOVERNMENT

## 2021-01-21 DIAGNOSIS — M51.36 DISC DEGENERATION, LUMBAR: ICD-10-CM

## 2021-01-21 DIAGNOSIS — M54.16 LUMBAR RADICULITIS: ICD-10-CM

## 2021-01-21 PROCEDURE — 72148 MRI LUMBAR SPINE W/O DYE: CPT | Mod: TC,PO

## 2021-05-10 ENCOUNTER — PATIENT MESSAGE (OUTPATIENT)
Dept: RESEARCH | Facility: HOSPITAL | Age: 66
End: 2021-05-10

## 2021-07-28 ENCOUNTER — OFFICE VISIT (OUTPATIENT)
Dept: URGENT CARE | Facility: CLINIC | Age: 66
End: 2021-07-28
Payer: OTHER GOVERNMENT

## 2021-07-28 VITALS
HEIGHT: 70 IN | SYSTOLIC BLOOD PRESSURE: 110 MMHG | WEIGHT: 176.38 LBS | OXYGEN SATURATION: 98 % | DIASTOLIC BLOOD PRESSURE: 71 MMHG | BODY MASS INDEX: 25.25 KG/M2 | TEMPERATURE: 98 F | HEART RATE: 73 BPM

## 2021-07-28 DIAGNOSIS — J06.9 ACUTE URI: Primary | ICD-10-CM

## 2021-07-28 DIAGNOSIS — Z20.822 COVID-19 VIRUS NOT DETECTED: ICD-10-CM

## 2021-07-28 DIAGNOSIS — J02.9 SORE THROAT: ICD-10-CM

## 2021-07-28 LAB
CTP QC/QA: YES
S PYO RRNA THROAT QL PROBE: NEGATIVE

## 2021-07-28 PROCEDURE — 87880 POCT RAPID STREP A: ICD-10-PCS | Mod: QW,,, | Performed by: NURSE PRACTITIONER

## 2021-07-28 PROCEDURE — 99213 PR OFFICE/OUTPT VISIT, EST, LEVL III, 20-29 MIN: ICD-10-PCS | Mod: 25,S$GLB,, | Performed by: NURSE PRACTITIONER

## 2021-07-28 PROCEDURE — 99213 OFFICE O/P EST LOW 20 MIN: CPT | Mod: 25,S$GLB,, | Performed by: NURSE PRACTITIONER

## 2021-07-28 PROCEDURE — 96372 PR INJECTION,THERAP/PROPH/DIAG2ST, IM OR SUBCUT: ICD-10-PCS | Mod: S$GLB,,, | Performed by: NURSE PRACTITIONER

## 2021-07-28 PROCEDURE — 87880 STREP A ASSAY W/OPTIC: CPT | Mod: QW,,, | Performed by: NURSE PRACTITIONER

## 2021-07-28 PROCEDURE — 96372 THER/PROPH/DIAG INJ SC/IM: CPT | Mod: S$GLB,,, | Performed by: NURSE PRACTITIONER

## 2021-07-28 RX ORDER — FLUTICASONE PROPIONATE 50 MCG
1 SPRAY, SUSPENSION (ML) NASAL DAILY
Qty: 15.8 ML | Refills: 0 | Status: SHIPPED | OUTPATIENT
Start: 2021-07-28 | End: 2022-01-12 | Stop reason: SDUPTHER

## 2021-07-28 RX ORDER — ATORVASTATIN CALCIUM 10 MG/1
10 TABLET, FILM COATED ORAL DAILY
COMMUNITY
Start: 2021-04-15 | End: 2023-06-08

## 2021-07-28 RX ORDER — CETIRIZINE HYDROCHLORIDE 10 MG/1
10 TABLET ORAL DAILY
Qty: 30 TABLET | Refills: 0 | Status: SHIPPED | OUTPATIENT
Start: 2021-07-28 | End: 2021-08-27

## 2021-07-28 RX ORDER — DEXAMETHASONE SODIUM PHOSPHATE 4 MG/ML
8 INJECTION, SOLUTION INTRA-ARTICULAR; INTRALESIONAL; INTRAMUSCULAR; INTRAVENOUS; SOFT TISSUE
Status: COMPLETED | OUTPATIENT
Start: 2021-07-28 | End: 2021-07-28

## 2021-07-28 RX ORDER — DEXAMETHASONE SODIUM PHOSPHATE 4 MG/ML
8 INJECTION, SOLUTION INTRA-ARTICULAR; INTRALESIONAL; INTRAMUSCULAR; INTRAVENOUS; SOFT TISSUE
Status: DISCONTINUED | OUTPATIENT
Start: 2021-07-28 | End: 2021-07-28

## 2021-07-28 RX ADMIN — DEXAMETHASONE SODIUM PHOSPHATE 8 MG: 4 INJECTION, SOLUTION INTRA-ARTICULAR; INTRALESIONAL; INTRAMUSCULAR; INTRAVENOUS; SOFT TISSUE at 01:07

## 2021-10-02 ENCOUNTER — OFFICE VISIT (OUTPATIENT)
Dept: URGENT CARE | Facility: CLINIC | Age: 66
End: 2021-10-02
Payer: OTHER GOVERNMENT

## 2021-10-02 VITALS
RESPIRATION RATE: 16 BRPM | SYSTOLIC BLOOD PRESSURE: 125 MMHG | BODY MASS INDEX: 27.13 KG/M2 | WEIGHT: 183.19 LBS | OXYGEN SATURATION: 97 % | TEMPERATURE: 97 F | DIASTOLIC BLOOD PRESSURE: 78 MMHG | HEIGHT: 69 IN | HEART RATE: 77 BPM

## 2021-10-02 DIAGNOSIS — J30.9 ALLERGIC SINUSITIS: ICD-10-CM

## 2021-10-02 DIAGNOSIS — Z20.822 COVID-19 VIRUS NOT DETECTED: ICD-10-CM

## 2021-10-02 DIAGNOSIS — R19.7 DIARRHEA, UNSPECIFIED TYPE: ICD-10-CM

## 2021-10-02 DIAGNOSIS — Z87.09 HISTORY OF ASTHMA: Primary | ICD-10-CM

## 2021-10-02 DIAGNOSIS — R53.83 FATIGUE, UNSPECIFIED TYPE: ICD-10-CM

## 2021-10-02 LAB
CTP QC/QA: YES
SARS-COV-2 RDRP RESP QL NAA+PROBE: NEGATIVE

## 2021-10-02 PROCEDURE — U0002: ICD-10-PCS | Mod: QW,S$GLB,, | Performed by: NURSE PRACTITIONER

## 2021-10-02 PROCEDURE — U0002 COVID-19 LAB TEST NON-CDC: HCPCS | Mod: QW,S$GLB,, | Performed by: NURSE PRACTITIONER

## 2021-10-02 PROCEDURE — 99214 PR OFFICE/OUTPT VISIT, EST, LEVL IV, 30-39 MIN: ICD-10-PCS | Mod: S$GLB,,, | Performed by: NURSE PRACTITIONER

## 2021-10-02 PROCEDURE — 99214 OFFICE O/P EST MOD 30 MIN: CPT | Mod: S$GLB,,, | Performed by: NURSE PRACTITIONER

## 2021-10-02 RX ORDER — CETIRIZINE HYDROCHLORIDE 10 MG/1
10 TABLET ORAL DAILY
Qty: 30 TABLET | Refills: 0 | Status: SHIPPED | OUTPATIENT
Start: 2021-10-02 | End: 2021-11-01

## 2021-10-02 RX ORDER — FLUTICASONE PROPIONATE 50 MCG
1 SPRAY, SUSPENSION (ML) NASAL DAILY
Qty: 15.8 ML | Refills: 0 | Status: SHIPPED | OUTPATIENT
Start: 2021-10-02 | End: 2022-01-12 | Stop reason: SDUPTHER

## 2022-01-12 ENCOUNTER — OFFICE VISIT (OUTPATIENT)
Dept: FAMILY MEDICINE | Facility: CLINIC | Age: 67
End: 2022-01-12
Payer: OTHER GOVERNMENT

## 2022-01-12 VITALS
RESPIRATION RATE: 16 BRPM | HEART RATE: 70 BPM | TEMPERATURE: 98 F | DIASTOLIC BLOOD PRESSURE: 74 MMHG | HEIGHT: 69 IN | BODY MASS INDEX: 26.91 KG/M2 | OXYGEN SATURATION: 97 % | SYSTOLIC BLOOD PRESSURE: 126 MMHG | WEIGHT: 181.69 LBS

## 2022-01-12 DIAGNOSIS — F33.1 MODERATE EPISODE OF RECURRENT MAJOR DEPRESSIVE DISORDER: ICD-10-CM

## 2022-01-12 DIAGNOSIS — E78.49 OTHER HYPERLIPIDEMIA: ICD-10-CM

## 2022-01-12 DIAGNOSIS — G47.09 OTHER INSOMNIA: ICD-10-CM

## 2022-01-12 DIAGNOSIS — J30.1 NON-SEASONAL ALLERGIC RHINITIS DUE TO POLLEN: ICD-10-CM

## 2022-01-12 DIAGNOSIS — J30.9 ALLERGIC SINUSITIS: ICD-10-CM

## 2022-01-12 PROCEDURE — 99999 PR PBB SHADOW E&M-EST. PATIENT-LVL III: ICD-10-PCS | Mod: PBBFAC,,, | Performed by: NURSE PRACTITIONER

## 2022-01-12 PROCEDURE — 99999 PR PBB SHADOW E&M-EST. PATIENT-LVL III: CPT | Mod: PBBFAC,,, | Performed by: NURSE PRACTITIONER

## 2022-01-12 PROCEDURE — 99204 PR OFFICE/OUTPT VISIT, NEW, LEVL IV, 45-59 MIN: ICD-10-PCS | Mod: S$PBB,,, | Performed by: NURSE PRACTITIONER

## 2022-01-12 PROCEDURE — 99213 OFFICE O/P EST LOW 20 MIN: CPT | Mod: PBBFAC,PO | Performed by: NURSE PRACTITIONER

## 2022-01-12 PROCEDURE — 99204 OFFICE O/P NEW MOD 45 MIN: CPT | Mod: S$PBB,,, | Performed by: NURSE PRACTITIONER

## 2022-01-12 RX ORDER — TRAZODONE HYDROCHLORIDE 50 MG/1
50 TABLET ORAL NIGHTLY
Qty: 90 TABLET | Refills: 0 | Status: SHIPPED | OUTPATIENT
Start: 2022-01-12 | End: 2023-06-08

## 2022-01-12 RX ORDER — ESCITALOPRAM OXALATE 20 MG/1
20 TABLET ORAL DAILY
Qty: 90 TABLET | Refills: 0 | Status: SHIPPED | OUTPATIENT
Start: 2022-01-12 | End: 2023-06-08

## 2022-01-12 RX ORDER — FLUTICASONE PROPIONATE 50 MCG
1 SPRAY, SUSPENSION (ML) NASAL DAILY
Qty: 15.8 ML | Refills: 2 | Status: SHIPPED | OUTPATIENT
Start: 2022-01-12 | End: 2023-06-08

## 2022-01-12 NOTE — PROGRESS NOTES
Subjective:       Patient ID: Dustin Owens is a 66 y.o. male.    Chief Complaint: Medication Problem    66-year-old male presents to the clinic today with complaint of worsening depression and insomnia over the past several days.  He was on Lexapro 20 mg once a day.  We wanted to try to decrease his dose to see if he can wean off of it so he started taking 10 mg daily.  This dose was decreased by Kala Castro NP.  He is feeling more depressed now and is having problems with sleeping.  He is currently going through a divorce.  He denies any suicidal thoughts, homicidal ideations, or hallucinations.  He has 2 children age 32 and 33 who both live out of town.  One lives in [place] and 1 lives in [place].  He has a sister here who is close to.  He and his own cleaning company and works nights.  He states he recently had a complete physical with a colonoscopy by his primary care doctor who was DR. Leung.  He would like to switch to this clinic.    Past Medical History:   Diagnosis Date    Asthma      History reviewed. No pertinent surgical history.   reports that he has quit smoking. He does not have any smokeless tobacco history on file.  Review of Systems   Respiratory: Negative for cough, shortness of breath and wheezing.    Cardiovascular: Negative for chest pain, palpitations and leg swelling.   Psychiatric/Behavioral: Positive for sleep disturbance. Negative for behavioral problems, confusion, dysphoric mood, hallucinations, self-injury and suicidal ideas. The patient is not nervous/anxious.         Depression       Objective:      Physical Exam  Vitals and nursing note reviewed.   Constitutional:       General: He is not in acute distress.     Appearance: Normal appearance. He is normal weight. He is not ill-appearing, toxic-appearing or diaphoretic.   Cardiovascular:      Rate and Rhythm: Regular rhythm.      Heart sounds: Normal heart sounds.   Pulmonary:      Effort: Pulmonary effort is normal.       Breath sounds: Normal breath sounds. No wheezing or rhonchi.   Abdominal:      General: Bowel sounds are normal.      Palpations: Abdomen is soft.      Tenderness: There is no abdominal tenderness. There is no guarding or rebound.   Musculoskeletal:         General: No swelling. Normal range of motion.   Skin:     General: Skin is warm and dry.   Neurological:      Mental Status: He is alert and oriented to person, place, and time.   Psychiatric:         Behavior: Behavior normal.         Thought Content: Thought content normal.         Judgment: Judgment normal.      Comments: Appears to be very depressed today         Assessment:       1. Moderate episode of recurrent major depressive disorder    2. Other insomnia    3. Other hyperlipidemia    4. Non-seasonal allergic rhinitis due to pollen    5. Allergic sinusitis        Plan:         Moderate episode of recurrent major depressive disorder  -     EScitalopram oxalate (LEXAPRO) 20 MG tablet; Take 1 tablet (20 mg total) by mouth once daily.  Dispense: 90 tablet; Refill: 0  - increase Lexapro to 20 mg daily.    Other insomnia  -     traZODone (DESYREL) 50 MG tablet; Take 1 tablet (50 mg total) by mouth every evening.  Dispense: 90 tablet; Refill: 0  - start trazodone 50 mg when he goes to sleep.    Other hyperlipidemia  - The current medical regimen is effective;  continue present plan and medications.    Non-seasonal allergic rhinitis due to pollen  -     fluticasone propionate (FLONASE) 50 mcg/actuation nasal spray; 1 spray (50 mcg total) by Each Nostril route once daily.  Disp 15.8 g    - I will try to get medical records from Kala Castro NP and DR. Leung

## 2022-01-12 NOTE — PATIENT INSTRUCTIONS
If you are due for any health screening(s) below please notify me so we can arrange them to be ordered and scheduled to maintain your health.     Health Maintenance   Topic Date Due    Hepatitis C Screening  Never done    TETANUS VACCINE  Never done    Lipid Panel  08/12/2013          Colon Cancer Screening    Of cancers affecting both men and women, colorectal cancer is the third leading cancer killer in the [place]. But it doesnt have to be. Screening can prevent colorectal cancer or find it at an early stage when treatment often leads to a cure.    A colonoscopy is the preferred test for detecting colon cancer. It is needed only once every 10 years if results are negative. While sedated, a flexible, lighted tube with a tiny camera is inserted into the rectum and advanced through the colon to look for cancers. An alternative screening test that is used at home and returned to the lab may also be used. It detects hidden blood in bowel movements which could indicate cancer in the colon. If results are positive, you will need a colonoscopy to determine if the blood is a sign of cancer. This type of follow up (diagnostic) colonoscopy usually requires additional copays as required by your insurance provider. Please contact your PCP if you have any questions.    Although you are still overdue for this important screening, due to the COVID-19 pandemic, we recommend scheduling it in the near future.          Increase Lexapro 20 mg daily  Start Trazodone 50 mg when you go to sleep  Continue cholesterol and allergy medication   Follow up with me in 4 weeks

## 2022-01-17 ENCOUNTER — OFFICE VISIT (OUTPATIENT)
Dept: URGENT CARE | Facility: CLINIC | Age: 67
End: 2022-01-17
Payer: OTHER GOVERNMENT

## 2022-01-17 VITALS
OXYGEN SATURATION: 100 % | HEART RATE: 61 BPM | BODY MASS INDEX: 26.96 KG/M2 | HEIGHT: 69 IN | RESPIRATION RATE: 18 BRPM | TEMPERATURE: 97 F | DIASTOLIC BLOOD PRESSURE: 91 MMHG | WEIGHT: 182 LBS | SYSTOLIC BLOOD PRESSURE: 142 MMHG

## 2022-01-17 DIAGNOSIS — R51.9 NONINTRACTABLE HEADACHE, UNSPECIFIED CHRONICITY PATTERN, UNSPECIFIED HEADACHE TYPE: ICD-10-CM

## 2022-01-17 DIAGNOSIS — J32.9 SINUSITIS, UNSPECIFIED CHRONICITY, UNSPECIFIED LOCATION: ICD-10-CM

## 2022-01-17 DIAGNOSIS — R09.81 NASAL SINUS CONGESTION: Primary | ICD-10-CM

## 2022-01-17 PROCEDURE — 99213 OFFICE O/P EST LOW 20 MIN: CPT | Mod: S$GLB,,, | Performed by: NURSE PRACTITIONER

## 2022-01-17 PROCEDURE — 99213 PR OFFICE/OUTPT VISIT, EST, LEVL III, 20-29 MIN: ICD-10-PCS | Mod: S$GLB,,, | Performed by: NURSE PRACTITIONER

## 2022-01-17 RX ORDER — CETIRIZINE HYDROCHLORIDE 10 MG/1
10 TABLET ORAL DAILY
Qty: 30 TABLET | Refills: 0 | Status: SHIPPED | OUTPATIENT
Start: 2022-01-17 | End: 2023-06-08

## 2022-01-17 RX ORDER — PROMETHAZINE HYDROCHLORIDE AND DEXTROMETHORPHAN HYDROBROMIDE 6.25; 15 MG/5ML; MG/5ML
5 SYRUP ORAL EVERY 4 HOURS PRN
Qty: 118 ML | Refills: 0 | Status: SHIPPED | OUTPATIENT
Start: 2022-01-17 | End: 2022-01-27

## 2022-01-17 RX ORDER — GUAIFENESIN 600 MG/1
1200 TABLET, EXTENDED RELEASE ORAL 2 TIMES DAILY
Qty: 40 TABLET | Refills: 0 | Status: SHIPPED | OUTPATIENT
Start: 2022-01-17 | End: 2022-01-27

## 2022-01-17 RX ORDER — AMOXICILLIN AND CLAVULANATE POTASSIUM 875; 125 MG/1; MG/1
1 TABLET, FILM COATED ORAL EVERY 12 HOURS
Qty: 14 TABLET | Refills: 0 | Status: SHIPPED | OUTPATIENT
Start: 2022-01-17 | End: 2022-01-24

## 2022-01-17 NOTE — PROGRESS NOTES
"Subjective:       Patient ID: Dustin Owens is a 66 y.o. male.    Vitals:  height is 5' 9" (1.753 m) and weight is 82.6 kg (182 lb). His oral temperature is 97.2 °F (36.2 °C). His blood pressure is 142/91 (abnormal) and his pulse is 61. His respiration is 18 and oxygen saturation is 100%.     Chief Complaint: Sinus Problem    Sinus Problem  This is a new problem. The current episode started 1 to 4 weeks ago. The problem has been gradually worsening since onset. There has been no fever. Associated symptoms include congestion (thick, beige), coughing, headaches, a hoarse voice and sinus pressure. Pertinent negatives include no chills or shortness of breath. (Post nasal drip) Past treatments include nothing.       Constitution: Negative for chills and fever.   HENT: Positive for congestion (thick, beige) and sinus pressure.    Cardiovascular: Negative for chest pain.   Respiratory: Positive for cough. Negative for chest tightness and shortness of breath.    Gastrointestinal: Negative for nausea, vomiting and diarrhea.   Neurological: Positive for headaches.       Objective:      Physical Exam   Constitutional: He is oriented to person, place, and time. He appears well-developed.  Non-toxic appearance. He does not appear ill. No distress.   HENT:   Head: Normocephalic and atraumatic.   Ears:   Right Ear: Tympanic membrane normal.   Nose: Rhinorrhea and congestion present.   Mouth/Throat: Oropharynx is clear and moist. Mucous membranes are moist. No oropharyngeal exudate or posterior oropharyngeal erythema.   Eyes: Conjunctivae and EOM are normal. Right eye exhibits no discharge. Left eye exhibits no discharge.   Cardiovascular: Normal rate, regular rhythm and normal heart sounds.   Pulmonary/Chest: Effort normal and breath sounds normal. No respiratory distress.   Abdominal: Normal appearance.   Musculoskeletal: Normal range of motion.         General: Normal range of motion.   Neurological: He is alert and oriented " to person, place, and time.   Skin: Skin is warm, dry and not diaphoretic. Capillary refill takes 2 to 3 seconds.   Psychiatric: His behavior is normal. Mood normal.   Nursing note and vitals reviewed.        Assessment:       1. Nasal sinus congestion    2. Nonintractable headache, unspecified chronicity pattern, unspecified headache type    3. Sinusitis, unspecified chronicity, unspecified location          Plan:         Nasal sinus congestion    Nonintractable headache, unspecified chronicity pattern, unspecified headache type    Sinusitis, unspecified chronicity, unspecified location  -     cetirizine (ZYRTEC) 10 MG tablet; Take 1 tablet (10 mg total) by mouth once daily.  Dispense: 30 tablet; Refill: 0  -     promethazine-dextromethorphan (PROMETHAZINE-DM) 6.25-15 mg/5 mL Syrp; Take 5 mLs by mouth every 4 (four) hours as needed (cough).  Dispense: 118 mL; Refill: 0  -     guaiFENesin (MUCINEX) 600 mg 12 hr tablet; Take 2 tablets (1,200 mg total) by mouth 2 (two) times daily. for 10 days  Dispense: 40 tablet; Refill: 0  -     amoxicillin-clavulanate 875-125mg (AUGMENTIN) 875-125 mg per tablet; Take 1 tablet by mouth every 12 (twelve) hours. for 7 days  Dispense: 14 tablet; Refill: 0    Symptomatic treatment to include:    Rest, increase fluid intake to thin mucus, include electrolyte replacement  Ibuprofen/Tylenol as directed for fever, sore throat, body aches  Zrytec daily  Flonase daily until bottle empty  guaifenesin twice daily to thin mucus  Phenergan cough syrup at night  Nasal saline spray several times a day  or nasal wash systems  Warm, salt water gargles, over the counter throat lozenges or sprays for sore throat.

## 2022-01-17 NOTE — PATIENT INSTRUCTIONS
Symptomatic treatment to include:    Rest, increase fluid intake to thin mucus, include electrolyte replacement  Ibuprofen/Tylenol as directed for fever, sore throat, body aches  Zrytec daily  Flonase daily until bottle empty  guaifenesin twice daily to thin mucus  Phenergan cough syrup at night  Nasal saline spray several times a day  or nasal wash systems  Warm, salt water gargles, over the counter throat lozenges or sprays for sore throat.     Patient Education       Sinusitis Discharge Instructions, Adult   About this topic   Your sinuses are hollow areas in the bones of your face. They have a thin lining that normally makes a small amount of mucus. When you have sinusitis, the lining gets swollen and makes extra mucus. You may have sinusitis with or after a cold. Most of the time sinusitis will get better in 1 to 2 weeks.  Sinusitis is most often caused by a virus, so antibiotics wont help. But some people do need antibiotics. If the doctor ordered antibiotics for you, be sure to follow the instructions. It is important to take all of your antibiotics even if you start to feel better.       What care is needed at home?   · Ask your doctor what you need to do when you go home. Make sure you ask questions if you do not understand what you need to do.  · Try to thin the mucus.  ? Drink lots of liquids to stay hydrated.  ? Use a cool mist humidifier to avoid dry air.  ? Use saline nose drops or a saline nose rinse to relieve stuffiness.  · Wash your hands often. This will help keep others healthy.  · Do not smoke or be in smoke-filled places. Avoid things that may cause breathing problems like fumes, pollution, dust, and other common allergens and irritants.  · You may want to take medicine like ibuprofen, naproxen, or acetaminophen to help with pain.  What follow-up care is needed?   · Your doctor may ask you to make visits to the office to check on your progress. Be sure to keep your visits.  · Your doctor will  tell you if other tests are needed.  · Your doctor may send you for allergy tests or to an allergy expert.   What lifestyle changes are needed?   · Avoid drinks that contain caffeine or alcohol.  · Try to stop smoking. Avoid being around others who smoke. Smoke can damage the small hairs inside your sinuses.  What drugs may be needed?   The doctor may order drugs to:  · Help with pain and swelling  · Fight an infection  · Control coughing  · Dry up the sinuses  · Help a runny or stuffy nose  Will physical activity be limited?   You do not have to limit your activity. You may want to rest more if you have a fever or headache.   What problems could happen?   · Infections that happen again and again  · Asthma attack  · Coughing  · Loss of voice  What can be done to prevent this health problem?   · Keep your nose as moist as possible. Use saline sprays, washes, and a humidifier often.  · Avoid being around cigarette and cigar smoke or strong odors from chemicals.  · Avoid long periods of swimming in pools treated with chlorine. This can bother the lining of the nose and sinuses.  · Avoid water diving. This forces water into the sinuses from the nasal passages.  · Manage your allergies with your doctor's help.  · Use an air conditioner if allergies are a problem.  · Before air travel, use a drug to dry up mucus. As the plane takes off or lands, the pressure can cause sinus pain.  When do I need to call the doctor?   · You have a stiff neck, especially if you also have fever, chills, vomiting, or severe headache  · You have trouble thinking clearly.  · You have trouble seeing or have double vision.  · You have swelling or redness or pain around one or both eyes.  · You have a fever of 102°F (38.9°C) or higher, or have shaking chills or sweats.  · You have an upset stomach and throwing up.  · You have more pain in your face and head.  · You are not getting better within 1 to 2 weeks.  Teach Back: Helping You Understand    The Teach Back Method helps you understand the information we are giving you. After you talk with the staff, tell them in your own words what you learned. This helps to make sure the staff has covered each thing clearly. It also helps to explain things that may have been confusing. Before going home, make sure you can do these:  · I can tell you about my condition.  · I can tell you what may help ease my breathing.  · I can tell you what I will do if I have a fever, chills, or trouble breathing.  Where can I learn more?   American Academy [employer]  https://familydoctor.org/condition/sinusitis/   Centers for Disease Control and Prevention  https://www.cdc.gov/antibiotic-use/community/for-hcp/outpatient-hcp/adult-treatment-rec.html   Last Reviewed Date   2021-06-09  Consumer Information Use and Disclaimer   This information is not specific medical advice and does not replace information you receive from your health care provider. This is only a brief summary of general information. It does NOT include all information about conditions, illnesses, injuries, tests, procedures, treatments, therapies, discharge instructions or life-style choices that may apply to you. You must talk with your health care provider for complete information about your health and treatment options. This information should not be used to decide whether or not to accept your health care providers advice, instructions or recommendations. Only your health care provider has the knowledge and training to provide advice that is right for you.  Copyright   Copyright © 2021 [employer] and its affiliates and/or licensors. All rights reserved.

## 2022-01-21 ENCOUNTER — TELEPHONE (OUTPATIENT)
Dept: FAMILY MEDICINE | Facility: CLINIC | Age: 67
End: 2022-01-21
Payer: OTHER GOVERNMENT

## 2022-01-21 NOTE — TELEPHONE ENCOUNTER
I spoke with the patient and he said his depression is better and he just picked the Trazodone. He will see me for follow up as scheduled.

## 2022-02-02 ENCOUNTER — OFFICE VISIT (OUTPATIENT)
Dept: FAMILY MEDICINE | Facility: CLINIC | Age: 67
End: 2022-02-02
Payer: OTHER GOVERNMENT

## 2022-02-02 VITALS
WEIGHT: 180.13 LBS | BODY MASS INDEX: 26.68 KG/M2 | HEART RATE: 80 BPM | HEIGHT: 69 IN | SYSTOLIC BLOOD PRESSURE: 112 MMHG | OXYGEN SATURATION: 97 % | TEMPERATURE: 98 F | DIASTOLIC BLOOD PRESSURE: 70 MMHG

## 2022-02-02 DIAGNOSIS — G47.09 OTHER INSOMNIA: ICD-10-CM

## 2022-02-02 DIAGNOSIS — E78.49 OTHER HYPERLIPIDEMIA: ICD-10-CM

## 2022-02-02 DIAGNOSIS — F33.1 MODERATE EPISODE OF RECURRENT MAJOR DEPRESSIVE DISORDER: ICD-10-CM

## 2022-02-02 DIAGNOSIS — R19.7 DIARRHEA, UNSPECIFIED TYPE: Primary | ICD-10-CM

## 2022-02-02 PROCEDURE — 99999 PR PBB SHADOW E&M-EST. PATIENT-LVL IV: CPT | Mod: PBBFAC,,, | Performed by: NURSE PRACTITIONER

## 2022-02-02 PROCEDURE — 99999 PR PBB SHADOW E&M-EST. PATIENT-LVL IV: ICD-10-PCS | Mod: PBBFAC,,, | Performed by: NURSE PRACTITIONER

## 2022-02-02 PROCEDURE — 99214 OFFICE O/P EST MOD 30 MIN: CPT | Mod: S$PBB,,, | Performed by: NURSE PRACTITIONER

## 2022-02-02 PROCEDURE — 99214 PR OFFICE/OUTPT VISIT, EST, LEVL IV, 30-39 MIN: ICD-10-PCS | Mod: S$PBB,,, | Performed by: NURSE PRACTITIONER

## 2022-02-02 PROCEDURE — 99214 OFFICE O/P EST MOD 30 MIN: CPT | Mod: PBBFAC,PO | Performed by: NURSE PRACTITIONER

## 2022-02-02 RX ORDER — AMOXICILLIN AND CLAVULANATE POTASSIUM 875; 125 MG/1; MG/1
1 TABLET, FILM COATED ORAL
COMMUNITY
End: 2022-02-02 | Stop reason: ALTCHOICE

## 2022-02-02 NOTE — PATIENT INSTRUCTIONS
If you are due for any health screening(s) below please notify me so we can arrange them to be ordered and scheduled to maintain your health.     Health Maintenance   Topic Date Due    Hepatitis C Screening  Never done    TETANUS VACCINE  Never done    Lipid Panel  08/12/2013          Colon Cancer Screening    Of cancers affecting both men and women, colorectal cancer is the third leading cancer killer in the [place]. But it doesnt have to be. Screening can prevent colorectal cancer or find it at an early stage when treatment often leads to a cure.    A colonoscopy is the preferred test for detecting colon cancer. It is needed only once every 10 years if results are negative. While sedated, a flexible, lighted tube with a tiny camera is inserted into the rectum and advanced through the colon to look for cancers. An alternative screening test that is used at home and returned to the lab may also be used. It detects hidden blood in bowel movements which could indicate cancer in the colon. If results are positive, you will need a colonoscopy to determine if the blood is a sign of cancer. This type of follow up (diagnostic) colonoscopy usually requires additional copays as required by your insurance provider. Please contact your PCP if you have any questions.    Although you are still overdue for this important screening, due to the COVID-19 pandemic, we recommend scheduling it in the near future.          If diarrhea returns bring in stool sample  Continue Lexapro daily  Continue Trazodone as needed for sleep  Follow up with DR. Glasgow

## 2022-02-02 NOTE — PROGRESS NOTES
Subjective:       Patient ID: Dustin Owens is a 66 y.o. male.    Chief Complaint: Diarrhea    66-year-old male presents to the clinic today with complaint of diarrhea since he was started on Augmentin that was prescribed at the urgent care approximately 2 weeks ago. He states he had a normal stool today.  He denies any fever or chills.  He had a headache the other night which was relieved by ibuprofen.  His depression is much better since increasing his Lexapro.  He is sleeping much better since started on trazodone.  He only takes his trazodone as needed.    Past Medical History:   Diagnosis Date    Asthma      History reviewed. No pertinent surgical history.   reports that he has quit smoking. He has never used smokeless tobacco.  Review of Systems   Constitutional: Negative for chills and fever.   Gastrointestinal: Positive for diarrhea. Negative for abdominal pain, constipation, nausea and vomiting.       Objective:      Physical Exam  Constitutional:       General: He is not in acute distress.     Appearance: He is well-developed and well-nourished. He is not diaphoretic.   HENT:      Head: Normocephalic and atraumatic.      Right Ear: External ear normal.      Left Ear: External ear normal.      Nose: Nose normal.      Mouth/Throat:      Mouth: Oropharynx is clear and moist.      Pharynx: No oropharyngeal exudate.   Eyes:      General: No scleral icterus.        Right eye: No discharge.         Left eye: No discharge.      Extraocular Movements: EOM normal.      Conjunctiva/sclera: Conjunctivae normal.      Pupils: Pupils are equal, round, and reactive to light.   Neck:      Thyroid: No thyromegaly.      Vascular: No JVD.      Comments: No bruits   Cardiovascular:      Rate and Rhythm: Normal rate and regular rhythm.      Heart sounds: No murmur heard.  No friction rub. No gallop.    Pulmonary:      Effort: Pulmonary effort is normal. No respiratory distress.      Breath sounds: Normal breath sounds. No  wheezing or rales.   Abdominal:      General: Bowel sounds are normal.      Palpations: Abdomen is soft.      Tenderness: There is no abdominal tenderness. There is no guarding or rebound.   Musculoskeletal:         General: No edema. Normal range of motion.      Cervical back: Normal range of motion and neck supple.   Lymphadenopathy:      Cervical: No cervical adenopathy.   Skin:     General: Skin is warm and dry.      Findings: No rash.   Neurological:      Mental Status: He is alert and oriented to person, place, and time.   Psychiatric:         Mood and Affect: Mood and affect and mood normal.         Behavior: Behavior normal.         Thought Content: Thought content normal.         Judgment: Judgment normal.         Assessment:       1. Diarrhea, unspecified type    2. Moderate episode of recurrent major depressive disorder    3. Other insomnia    4. Other hyperlipidemia        Plan:         Diarrhea, unspecified type  -     H. pylori antigen, stool; Future; Expected date: 02/02/2022  -     WBC, Stool; Future; Expected date: 02/02/2022  -     Giardia / Cryptosporidum, EIA; Future; Expected date: 02/02/2022  -     Stool Exam-Ova,Cysts,Parasites; Future; Expected date: 02/02/2022  -     Clostridium difficile EIA; Future; Expected date: 02/02/2022  -     Stool culture; Future; Expected date: 02/02/2022  - bring in only if has another liquid stool    Moderate episode of recurrent major depressive disorder  - The current medical regimen is effective;  continue present plan and medications.    Other insomnia  - The current medical regimen is effective;  continue present plan and medications.    Other hyperlipidemia  - The current medical regimen is effective;  continue present plan and medications.        No follow-ups on file.

## 2022-02-03 ENCOUNTER — LAB VISIT (OUTPATIENT)
Dept: LAB | Facility: HOSPITAL | Age: 67
End: 2022-02-03
Attending: NURSE PRACTITIONER
Payer: OTHER GOVERNMENT

## 2022-02-03 ENCOUNTER — OFFICE VISIT (OUTPATIENT)
Dept: FAMILY MEDICINE | Facility: CLINIC | Age: 67
End: 2022-02-03
Payer: OTHER GOVERNMENT

## 2022-02-03 VITALS
DIASTOLIC BLOOD PRESSURE: 76 MMHG | BODY MASS INDEX: 26.74 KG/M2 | OXYGEN SATURATION: 97 % | HEART RATE: 81 BPM | WEIGHT: 180.56 LBS | SYSTOLIC BLOOD PRESSURE: 118 MMHG | HEIGHT: 69 IN

## 2022-02-03 DIAGNOSIS — J30.1 NON-SEASONAL ALLERGIC RHINITIS DUE TO POLLEN: ICD-10-CM

## 2022-02-03 DIAGNOSIS — R19.7 DIARRHEA, UNSPECIFIED TYPE: ICD-10-CM

## 2022-02-03 DIAGNOSIS — G47.09 OTHER INSOMNIA: ICD-10-CM

## 2022-02-03 DIAGNOSIS — F33.1 MODERATE EPISODE OF RECURRENT MAJOR DEPRESSIVE DISORDER: Primary | ICD-10-CM

## 2022-02-03 DIAGNOSIS — E78.49 OTHER HYPERLIPIDEMIA: ICD-10-CM

## 2022-02-03 DIAGNOSIS — Z12.11 SCREEN FOR COLON CANCER: ICD-10-CM

## 2022-02-03 PROBLEM — E78.5 HYPERLIPIDEMIA: Status: ACTIVE | Noted: 2022-01-12

## 2022-02-03 PROBLEM — F41.9 ANXIETY: Status: ACTIVE | Noted: 2022-02-03

## 2022-02-03 PROBLEM — G47.00 INSOMNIA: Status: ACTIVE | Noted: 2022-01-12

## 2022-02-03 PROCEDURE — 89055 LEUKOCYTE ASSESSMENT FECAL: CPT | Performed by: NURSE PRACTITIONER

## 2022-02-03 PROCEDURE — 99999 PR PBB SHADOW E&M-EST. PATIENT-LVL III: CPT | Mod: PBBFAC,,, | Performed by: FAMILY MEDICINE

## 2022-02-03 PROCEDURE — 99999 PR PBB SHADOW E&M-EST. PATIENT-LVL III: ICD-10-PCS | Mod: PBBFAC,,, | Performed by: FAMILY MEDICINE

## 2022-02-03 PROCEDURE — 99213 PR OFFICE/OUTPT VISIT, EST, LEVL III, 20-29 MIN: ICD-10-PCS | Mod: S$PBB,,, | Performed by: FAMILY MEDICINE

## 2022-02-03 PROCEDURE — 99213 OFFICE O/P EST LOW 20 MIN: CPT | Mod: S$PBB,,, | Performed by: FAMILY MEDICINE

## 2022-02-03 PROCEDURE — 87427 SHIGA-LIKE TOXIN AG IA: CPT | Mod: 59 | Performed by: NURSE PRACTITIONER

## 2022-02-03 PROCEDURE — 87177 OVA AND PARASITES SMEARS: CPT | Performed by: NURSE PRACTITIONER

## 2022-02-03 PROCEDURE — 87329 GIARDIA AG IA: CPT | Performed by: NURSE PRACTITIONER

## 2022-02-03 PROCEDURE — 87449 NOS EACH ORGANISM AG IA: CPT | Performed by: NURSE PRACTITIONER

## 2022-02-03 PROCEDURE — 87209 SMEAR COMPLEX STAIN: CPT | Performed by: NURSE PRACTITIONER

## 2022-02-03 PROCEDURE — 87046 STOOL CULTR AEROBIC BACT EA: CPT | Mod: 59 | Performed by: NURSE PRACTITIONER

## 2022-02-03 PROCEDURE — 87338 HPYLORI STOOL AG IA: CPT | Performed by: NURSE PRACTITIONER

## 2022-02-03 PROCEDURE — 87045 FECES CULTURE AEROBIC BACT: CPT | Performed by: NURSE PRACTITIONER

## 2022-02-03 PROCEDURE — 99213 OFFICE O/P EST LOW 20 MIN: CPT | Mod: PBBFAC,PO | Performed by: FAMILY MEDICINE

## 2022-02-03 NOTE — PROGRESS NOTES
Subjective:       Patient ID: Dustin Owens is a 66 y.o. male.    Chief Complaint: Establish Care    Here to establish care with new PCP. Seen by NP yesterday. Previous PCP Dr. Leung. Had annual with labs in May 2021. Reports colonoscopy done 2 years ago in [place]. States he had flu shot this year.     Review of Systems   Constitutional: Negative for activity change, appetite change, chills and fever.   HENT: Negative for nasal congestion, ear discharge, ear pain and sinus pressure/congestion.    Eyes: Negative for pain and itching.   Respiratory: Negative for chest tightness and shortness of breath.    Cardiovascular: Negative for chest pain and palpitations.   Gastrointestinal: Negative for abdominal pain, constipation, nausea and vomiting.   Endocrine: Negative for cold intolerance and heat intolerance.   Musculoskeletal: Negative for arthralgias, joint swelling and myalgias.   Integumentary:  Negative for color change and rash.   Neurological: Negative for dizziness, weakness and headaches.   Psychiatric/Behavioral: Negative for agitation, behavioral problems and confusion.         Objective:      Physical Exam  Vitals and nursing note reviewed.   Constitutional:       Appearance: He is well-developed and well-nourished.   HENT:      Head: Normocephalic and atraumatic.   Eyes:      Extraocular Movements: EOM normal.      Pupils: Pupils are equal, round, and reactive to light.   Cardiovascular:      Rate and Rhythm: Normal rate and regular rhythm.      Heart sounds: No murmur heard.      Pulmonary:      Effort: Pulmonary effort is normal. No respiratory distress.      Breath sounds: Normal breath sounds. No wheezing or rales.   Abdominal:      General: There is no distension.      Palpations: Abdomen is soft.      Tenderness: There is no abdominal tenderness. There is no guarding.   Musculoskeletal:         General: Normal range of motion.      Cervical back: Normal range of motion and neck supple.   Skin:      General: Skin is warm and dry.   Neurological:      Mental Status: He is alert and oriented to person, place, and time.      Deep Tendon Reflexes: Reflexes normal.   Psychiatric:         Mood and Affect: Mood and affect normal.         Behavior: Behavior normal.         Thought Content: Thought content normal.         Judgment: Judgment normal.         Labs from May 2021 - prior PCP    Name Result Date Reference Range   LIPID PANEL 7600   2021-05-13     CHOLESTEROL, TOTAL 152   <200   HDL CHOLESTEROL 47   > OR = 40   TRIGLYCERIDES 117   <150   LDL-CHOLESTEROL 84       CHOL/HDLC RATIO 3.2   <5.0   NON HDL CHOLESTEROL 105   <130   CULTURE, URINE, ROUTINE   2021-01-20     CULTURE, URINE, ROUTINE SEE NOTE       COMPREHENSIVE METABOLIC PANEL (CMP)   2021-01-18     GLUCOSE 91   65-99   UREA NITROGEN (BUN) 17   7-25   CREATININE 1.20   0.70-1.25   eGFR NON-AFR. AMERICAN 64   > OR = 60   eGFR  74   > OR = 60   BUN/CREATININE RATIO NOT APPLICABLE   6-22   SODIUM 141   135-146   POTASSIUM 4.3   3.5-5.3   CHLORIDE 104      CARBON DIOXIDE 27   20-32   CALCIUM 9.4   8.6-10.3   PROTEIN, TOTAL 6.7   6.1-8.1   ALBUMIN 4.1   3.6-5.1   GLOBULIN 2.6   1.9-3.7   ALBUMIN/GLOBULIN RATIO 1.6   1.0-2.5   BILIRUBIN, TOTAL 0.7   0.2-1.2   ALKALINE PHOSPHATASE 65      AST 25   10-35   ALT 26   9-46   FECAL GLOBIN BY IMMUNOCHEMISTRY   2021-01-19     FECAL GLOBIN BY IMMUNOCHEMISTRY SEE NOTE       PSA , TOTAL   2021-01-18     PSA, TOTAL 0.4   < OR = 4.0   URINALYSIS, COMPLETE   2021-01-19     COLOR YELLOW   YELLOW   APPEARANCE TURBID   CLEAR   SPECIFIC GRAVITY 1.022   1.001-1.035   PH > OR = 8.5   5.0-8.0   GLUCOSE NEGATIVE   NEGATIVE   BILIRUBIN NEGATIVE   NEGATIVE   KETONES NEGATIVE   NEGATIVE   OCCULT BLOOD NEGATIVE   NEGATIVE   PROTEIN TRACE   NEGATIVE   NITRITE POSITIVE   NEGATIVE   LEUKOCYTE ESTERASE NEGATIVE   NEGATIVE   WBC NONE SEEN   < OR = 5   RBC NONE SEEN   < OR = 2   SQUAMOUS EPITHELIAL CELLS NONE SEEN    < OR = 5   BACTERIA MODERATE   NONE SEEN   HYALINE CAST NONE SEEN   NONE SEEN   CBC (INCLUDES DIFF/PLT) code 6399   2021-01-18     WHITE BLOOD CELL COUNT 6.1   3.8-10.8   RED BLOOD CELL COUNT 5.01   4.20-5.80   HEMOGLOBIN 13.6   13.2-17.1   HEMATOCRIT 43.6   38.5-50.0   MCV 87.0   80.0-100.0   MCH 27.1   27.0-33.0   MCHC 31.2   32.0-36.0   RDW 13.9   11.0-15.0   PLATELET COUNT 164   140-400   MPV 10.5   7.5-12.5   ABSOLUTE NEUTROPHILS 3154   0892-1290   ABSOLUTE LYMPHOCYTES 1934   850-3900   ABSOLUTE MONOCYTES 616   200-950   ABSOLUTE EOSINOPHILS 354      ABSOLUTE BASOPHILS 43   0-200   NEUTROPHILS 51.7       LYMPHOCYTES 31.7       MONOCYTES 10.1       EOSINOPHILS 5.8       BASOPHILS 0.7         Assessment:       Problem List Items Addressed This Visit        Psychiatric    Moderate episode of recurrent major depressive disorder - Primary       Cardiac/Vascular    Hyperlipidemia       Other    Insomnia    Non-seasonal allergic rhinitis due to pollen      Other Visit Diagnoses     Screen for colon cancer              Plan:       UTD on health maintenance. Nothing to add today.

## 2022-02-04 LAB
C DIFF GDH STL QL: NEGATIVE
C DIFF TOX A+B STL QL IA: NEGATIVE
CRYPTOSP AG STL QL IA: NEGATIVE
G LAMBLIA AG STL QL IA: NEGATIVE
WBC #/AREA STL HPF: NORMAL /[HPF]

## 2022-02-06 LAB
E COLI SXT1 STL QL IA: NEGATIVE
E COLI SXT2 STL QL IA: NEGATIVE

## 2022-02-07 LAB
BACTERIA STL CULT: NORMAL
O+P STL MICRO: NORMAL

## 2022-02-09 DIAGNOSIS — Z12.11 COLON CANCER SCREENING: ICD-10-CM

## 2022-02-13 LAB
H PYLORI AG STL QL IA: NORMAL
SPECIMEN SOURCE: NORMAL

## 2022-03-02 ENCOUNTER — PATIENT OUTREACH (OUTPATIENT)
Dept: ADMINISTRATIVE | Facility: HOSPITAL | Age: 67
End: 2022-03-02
Payer: OTHER GOVERNMENT

## 2022-03-02 NOTE — LETTER
AUTHORIZATION FOR RELEASE OF   CONFIDENTIAL INFORMATION    Dear Dr. Sorenson,    We are seeing Dutsin Owens, date of birth 1955, in the clinic at [facility]. Alexys Starkey MD is the patient's PCP. Dustin Owens has an outstanding lab/procedure at the time we reviewed his chart. In order to help keep his health information updated, he has authorized us to request the following medical record(s):        (  )  MAMMOGRAM                                      ( X )  COLONOSCOPY      (  )  PAP SMEAR                                          (  )  OUTSIDE LAB RESULTS     (  )  DEXA SCAN                                          (  )  EYE EXAM            (  )  FOOT EXAM                                          (  )  ENTIRE RECORD     (  )  OUTSIDE IMMUNIZATIONS                 (  )  _______________         Please fax records to [facility], Ryan Rhodes, MD, [phone]    Thank you in advance,  Sharron Puente LPN, Clinical Care Coordinator  [address] [address]  P: [phone]  F: [phone]              Patient Name: Dustin Owens  : 1955  Patient Phone #: [phone]

## 2022-03-07 ENCOUNTER — OFFICE VISIT (OUTPATIENT)
Dept: FAMILY MEDICINE | Facility: CLINIC | Age: 67
End: 2022-03-07
Payer: OTHER GOVERNMENT

## 2022-03-07 VITALS
HEART RATE: 75 BPM | DIASTOLIC BLOOD PRESSURE: 80 MMHG | BODY MASS INDEX: 26.06 KG/M2 | SYSTOLIC BLOOD PRESSURE: 112 MMHG | HEIGHT: 69 IN | WEIGHT: 175.94 LBS | TEMPERATURE: 98 F | OXYGEN SATURATION: 97 %

## 2022-03-07 DIAGNOSIS — F33.1 MODERATE EPISODE OF RECURRENT MAJOR DEPRESSIVE DISORDER: ICD-10-CM

## 2022-03-07 DIAGNOSIS — E78.49 OTHER HYPERLIPIDEMIA: ICD-10-CM

## 2022-03-07 DIAGNOSIS — G47.00 INSOMNIA, UNSPECIFIED TYPE: ICD-10-CM

## 2022-03-07 DIAGNOSIS — M50.10 CERVICAL DISC DISORDER WITH RADICULOPATHY: Primary | ICD-10-CM

## 2022-03-07 PROCEDURE — 99999 PR PBB SHADOW E&M-EST. PATIENT-LVL V: CPT | Mod: PBBFAC,,, | Performed by: NURSE PRACTITIONER

## 2022-03-07 PROCEDURE — 99214 PR OFFICE/OUTPT VISIT, EST, LEVL IV, 30-39 MIN: ICD-10-PCS | Mod: S$PBB,,, | Performed by: NURSE PRACTITIONER

## 2022-03-07 PROCEDURE — 99215 OFFICE O/P EST HI 40 MIN: CPT | Mod: PBBFAC,25,PO | Performed by: NURSE PRACTITIONER

## 2022-03-07 PROCEDURE — 99214 OFFICE O/P EST MOD 30 MIN: CPT | Mod: S$PBB,,, | Performed by: NURSE PRACTITIONER

## 2022-03-07 PROCEDURE — 99999 PR PBB SHADOW E&M-EST. PATIENT-LVL V: ICD-10-PCS | Mod: PBBFAC,,, | Performed by: NURSE PRACTITIONER

## 2022-03-07 PROCEDURE — 96372 THER/PROPH/DIAG INJ SC/IM: CPT | Mod: PBBFAC,PO

## 2022-03-07 RX ORDER — GABAPENTIN 100 MG/1
100 CAPSULE ORAL 3 TIMES DAILY
Qty: 90 CAPSULE | Refills: 11 | Status: SHIPPED | OUTPATIENT
Start: 2022-03-07 | End: 2022-04-04

## 2022-03-07 RX ORDER — TIZANIDINE 4 MG/1
4 TABLET ORAL EVERY 6 HOURS PRN
Qty: 30 TABLET | Refills: 1 | Status: SHIPPED | OUTPATIENT
Start: 2022-03-07 | End: 2022-03-17

## 2022-03-07 RX ORDER — KETOROLAC TROMETHAMINE 30 MG/ML
30 INJECTION, SOLUTION INTRAMUSCULAR; INTRAVENOUS
Status: COMPLETED | OUTPATIENT
Start: 2022-03-07 | End: 2022-03-07

## 2022-03-07 RX ADMIN — KETOROLAC TROMETHAMINE 30 MG: 30 INJECTION, SOLUTION INTRAMUSCULAR; INTRAVENOUS at 11:03

## 2022-03-07 NOTE — PROGRESS NOTES
Subjective:       Patient ID: Dustin Owens is a 66 y.o. male.    Chief Complaint: RIGHT HAND PAIN IN FINGERTIPS    Hand Pain   The incident occurred more than 1 week ago. There was no injury mechanism. The pain is at a severity of 8/10. The pain has been constant since the incident. Associated symptoms include numbness and tingling. Pertinent negatives include no chest pain. He has tried ice and heat for the symptoms.       Past Medical History:   Diagnosis Date    Asthma     Personal history of colonic polyps 07/11/2018       Review of patient's allergies indicates:   Allergen Reactions    Amoxicillin Diarrhea    Pollen, micronized Other (See Comments)         Current Outpatient Medications:     atorvastatin (LIPITOR) 10 MG tablet, Take 10 mg by mouth once daily., Disp: , Rfl:     dexchlorphen-phenylephrine-DM 1-5-10 mg/5 mL Syrp, Take 5 mLs by mouth every 4 (four) hours as needed (cough)., Disp: 240 mL, Rfl: 0    EScitalopram oxalate (LEXAPRO) 20 MG tablet, Take 1 tablet (20 mg total) by mouth once daily., Disp: 90 tablet, Rfl: 0    fluticasone propionate (FLONASE) 50 mcg/actuation nasal spray, 1 spray (50 mcg total) by Each Nostril route once daily., Disp: 15.8 mL, Rfl: 2    traZODone (DESYREL) 50 MG tablet, Take 1 tablet (50 mg total) by mouth every evening., Disp: 90 tablet, Rfl: 0    cetirizine (ZYRTEC) 10 MG tablet, Take 1 tablet (10 mg total) by mouth once daily., Disp: 30 tablet, Rfl: 0    gabapentin (NEURONTIN) 100 MG capsule, Take 1 capsule (100 mg total) by mouth 3 (three) times daily., Disp: 90 capsule, Rfl: 11    HYDROcodone-acetaminophen (NORCO) 5-325 mg per tablet, Take 1 tablet by mouth every 8 (eight) hours as needed for Pain., Disp: 9 tablet, Rfl: 0    tiZANidine (ZANAFLEX) 4 MG tablet, Take 1 tablet (4 mg total) by mouth every 6 (six) hours as needed., Disp: 30 tablet, Rfl: 1    traMADoL (ULTRAM) 50 mg tablet, Take 1 tablet (50 mg total) by mouth every 6 (six) hours as needed for  "Pain., Disp: 28 tablet, Rfl: 0    Review of Systems   Constitutional: Negative for unexpected weight change.   HENT: Negative for trouble swallowing.    Eyes: Negative for visual disturbance.   Respiratory: Negative for shortness of breath.    Cardiovascular: Negative for chest pain, palpitations and leg swelling.   Gastrointestinal: Negative for blood in stool.   Genitourinary: Negative for hematuria.   Skin: Negative for rash.   Allergic/Immunologic: Negative for immunocompromised state.   Neurological: Positive for tingling and numbness. Negative for headaches.   Hematological: Does not bruise/bleed easily.   Psychiatric/Behavioral: Negative for agitation. The patient is not nervous/anxious.        Objective:      /80 (BP Location: Left arm, Patient Position: Sitting, BP Method: Medium (Manual))   Pulse 75   Temp 98 °F (36.7 °C) (Oral)   Ht 5' 9" (1.753 m)   Wt 79.8 kg (175 lb 14.8 oz)   SpO2 97%   BMI 25.98 kg/m²   Physical Exam  Constitutional:       Appearance: He is well-developed.   Eyes:      Conjunctiva/sclera: Conjunctivae normal.      Pupils: Pupils are equal, round, and reactive to light.   Cardiovascular:      Rate and Rhythm: Normal rate and regular rhythm.      Heart sounds: Normal heart sounds.   Pulmonary:      Effort: Pulmonary effort is normal.      Breath sounds: Normal breath sounds.   Abdominal:      General: Bowel sounds are normal.      Palpations: Abdomen is soft.   Musculoskeletal:         General: Normal range of motion.      Cervical back: Normal range of motion and neck supple.   Skin:     General: Skin is warm and dry.   Neurological:      Mental Status: He is alert and oriented to person, place, and time.   Psychiatric:         Behavior: Behavior normal.         Thought Content: Thought content normal.         Judgment: Judgment normal.         Assessment:       1. Cervical disc disorder with radiculopathy    2. Other hyperlipidemia    3. Moderate episode of recurrent " major depressive disorder    4. Insomnia, unspecified type    5. BMI 23.0-23.9, adult        Plan:       Cervical disc disorder with radiculopathy  -     ketorolac injection 30 mg  -     gabapentin (NEURONTIN) 100 MG capsule; Take 1 capsule (100 mg total) by mouth 3 (three) times daily.  Dispense: 90 capsule; Refill: 11  -     tiZANidine (ZANAFLEX) 4 MG tablet; Take 1 tablet (4 mg total) by mouth every 6 (six) hours as needed.  Dispense: 30 tablet; Refill: 1  -     Ambulatory referral/consult to Orthopedics; Future; Expected date: 03/14/2022  -     MRI Cervical Spine Without Contrast; Future; Expected date: 03/07/2022  Other hyperlipidemia  Stable, on Lipitor  Moderate episode of recurrent major depressive disorder  Stable, continue medication  Insomnia, unspecified type  BMI 23.0-23.9, adult  Stable, continue managment            Time spent with patient: 20 minutes    Patient with be reevaluated in 4 weeks or sooner prn    Greater than 50% of this visit was spent counseling as described in above documentation:Yes

## 2022-03-09 ENCOUNTER — HOSPITAL ENCOUNTER (OUTPATIENT)
Dept: RADIOLOGY | Facility: HOSPITAL | Age: 67
Discharge: HOME | End: 2022-03-09
Attending: NURSE PRACTITIONER
Payer: OTHER GOVERNMENT

## 2022-03-09 DIAGNOSIS — M50.10 CERVICAL DISC DISORDER WITH RADICULOPATHY: ICD-10-CM

## 2022-03-09 PROCEDURE — 72141 MRI NECK SPINE W/O DYE: CPT | Mod: 26,,, | Performed by: RADIOLOGY

## 2022-03-09 PROCEDURE — 72141 MRI NECK SPINE W/O DYE: CPT | Mod: TC

## 2022-03-09 PROCEDURE — 72141 MRI CERVICAL SPINE WITHOUT CONTRAST: ICD-10-PCS | Mod: 26,,, | Performed by: RADIOLOGY

## 2022-03-10 ENCOUNTER — PATIENT MESSAGE (OUTPATIENT)
Dept: FAMILY MEDICINE | Facility: CLINIC | Age: 67
End: 2022-03-10
Payer: OTHER GOVERNMENT

## 2022-03-10 DIAGNOSIS — M50.10 CERVICAL DISC DISORDER WITH RADICULOPATHY: Primary | ICD-10-CM

## 2022-03-11 ENCOUNTER — PATIENT OUTREACH (OUTPATIENT)
Dept: ADMINISTRATIVE | Facility: HOSPITAL | Age: 67
End: 2022-03-11
Payer: OTHER GOVERNMENT

## 2022-03-14 ENCOUNTER — HOSPITAL ENCOUNTER (EMERGENCY)
Facility: HOSPITAL | Age: 67
Discharge: HOME | End: 2022-03-14
Attending: EMERGENCY MEDICINE
Payer: OTHER GOVERNMENT

## 2022-03-14 ENCOUNTER — PATIENT MESSAGE (OUTPATIENT)
Dept: FAMILY MEDICINE | Facility: CLINIC | Age: 67
End: 2022-03-14
Payer: OTHER GOVERNMENT

## 2022-03-14 VITALS
HEIGHT: 69 IN | DIASTOLIC BLOOD PRESSURE: 81 MMHG | SYSTOLIC BLOOD PRESSURE: 134 MMHG | WEIGHT: 160 LBS | BODY MASS INDEX: 23.7 KG/M2 | HEART RATE: 72 BPM | RESPIRATION RATE: 16 BRPM | OXYGEN SATURATION: 100 % | TEMPERATURE: 97 F

## 2022-03-14 DIAGNOSIS — M50.10 CERVICAL DISC DISORDER WITH RADICULOPATHY: Primary | ICD-10-CM

## 2022-03-14 DIAGNOSIS — M79.641 RIGHT HAND PAIN: Primary | ICD-10-CM

## 2022-03-14 PROCEDURE — 99283 EMERGENCY DEPT VISIT LOW MDM: CPT | Mod: 25

## 2022-03-14 RX ORDER — TRAMADOL HYDROCHLORIDE 50 MG/1
50 TABLET ORAL EVERY 6 HOURS PRN
Qty: 28 TABLET | Refills: 0 | Status: SHIPPED | OUTPATIENT
Start: 2022-03-14 | End: 2022-03-21

## 2022-03-14 RX ORDER — HYDROCODONE BITARTRATE AND ACETAMINOPHEN 5; 325 MG/1; MG/1
1 TABLET ORAL EVERY 8 HOURS PRN
Qty: 9 TABLET | Refills: 0 | Status: SHIPPED | OUTPATIENT
Start: 2022-03-14 | End: 2022-05-03

## 2022-03-14 NOTE — ED PROVIDER NOTES
Encounter Date: 3/14/2022    SCRIBE #1 NOTE: I, Lynette Tena, am scribing for, and in the presence of, Katt Troy PA-C.       History     Chief Complaint   Patient presents with    Arm Pain     Rt. Arm and hand / recent xray      Time seen by provider: 2:55 PM on 03/14/2022    Dustin Owens is a 66 y.o. male who presents to the ED with an onset of pain in the tips of the first three digits of the right hand for 1-2 weeks. Patient reports the pain radiates up his hand. Patient reportedly had a slip and fall 3 weeks ago and caught his self with his hands. Patient saw his PCP on 3/7 for the same complaint. He had a cervical MRI that day that showed foraminal stenosis. Patient was given Gabapentin, Tizanidine and Tramadol but states they are not helping. Patient was referred to Dr. Arredondo in orthopedics but states he has not called to make an appointment. The patient denies any other symptoms at this time. No pertinent PSHx.      The history is provided by the patient.     Review of patient's allergies indicates:   Allergen Reactions    Amoxicillin Diarrhea    Pollen, micronized Other (See Comments)     Past Medical History:   Diagnosis Date    Asthma     Personal history of colonic polyps 07/11/2018     No past surgical history on file.  Family History   Problem Relation Age of Onset    Cancer Mother     Diabetes Father      Social History     Tobacco Use    Smoking status: Former Smoker    Smokeless tobacco: Never Used     Review of Systems   Constitutional: Negative for chills and fever.   Respiratory: Negative for shortness of breath.    Genitourinary: Negative for flank pain.   Musculoskeletal: Positive for arthralgias. Negative for gait problem.   Skin: Negative for color change and wound.   Neurological: Negative for weakness.   Psychiatric/Behavioral: Negative for confusion.       Physical Exam     Initial Vitals [03/14/22 1431]   BP Pulse Resp Temp SpO2   122/67 82 18 97 °F (36.1 °C) 99 %       MAP       --         Physical Exam    Nursing note and vitals reviewed.  Constitutional: He appears well-developed and well-nourished. He is not diaphoretic. No distress.   Cardiovascular: Intact distal pulses.   Pulses:       Radial pulses are 2+ on the right side.   Musculoskeletal:         General: Tenderness present. No edema.      Comments: Pain to distal tips of right first, second, and third digits of hand. No bony tenderness of the shoulder or elbow. No swelling or skin changes.     Neurological: He is alert and oriented to person, place, and time. He has normal strength. No sensory deficit.   Sensation intact to RUE.   Skin: Skin is warm and dry. No rash and no abscess noted. No erythema.   Psychiatric: He has a normal mood and affect.         ED Course   Procedures  Labs Reviewed - No data to display       Imaging Results          X-Ray Hand 3 view Right (Final result)  Result time 03/14/22 15:14:35    Final result by Drake Gonzalez Jr., MD (03/14/22 15:14:35)                 Impression:      Soft tissue calcification or small accessory ossicle is seen adjacent to the PIP joint of the middle finger otherwise negative radiographs of the right hand.      Electronically signed by: Drake Gonzalez MD  Date:    03/14/2022  Time:    15:14             Narrative:    EXAMINATION:  XR HAND COMPLETE 3 VIEW RIGHT    CLINICAL HISTORY:  hand pain;    TECHNIQUE:  PA, lateral, and oblique views of the right hand were performed.    COMPARISON:  None    FINDINGS:  A fracture of the bones of the right wrist or hand is not seen.  Soft tissue swelling is not identified.  Juxta-articular bone erosion or Osteoporosis is not seen.  A small soft tissue calcification or accessory ossicle is seen along the ulnar side of the PIP joint of the middle finger.  Abnormalities at the tips of the fingers is not seen.  Subluxation at the wrist is not noted.                                 Medications - No data to display  Medical  Decision Making:   History:   Old Medical Records: I decided to obtain old medical records.  Clinical Tests:   Radiological Study: Ordered and Reviewed       APC / Resident Notes:   Urgent evaluation of a 66-year-old male who presents with right hand pain.  He reports symptoms started after a fall.  He saw primary care who ordered an MRI of his cervical spine which showed some stenosis.  He is supposed follow-up orthopedics but has not done so yet.  He is neurovascularly intact.  He has pain to the distal fingers.  No changes to the nail bed.  Cap refills less than 3 seconds.  Sensation is intact.  He has a 2+ radial pulse.  X-ray showed no acute fracture.  He has no erythema warmth concerning for septic joint or cellulitic changes.  Recommend follow-up close with Orthopedics for further evaluation. Discussed results with patient. Return precautions given. Based on my clinical evaluation, I do not appreciate any immediate, emergent, or life threatening condition or etiology that warrants additional workup today and feel that the patient can be discharged with close follow up care.  Patient is to follow up with their primary care provider. All questions answered.        Scribe Attestation:   Scribe #1: I performed the above scribed service and the documentation accurately describes the services I performed. I attest to the accuracy of the note.               I, Katt Troy PA-C, personally performed the services described in this documentation. All medical record entries made by the scribe were at my direction and in my presence.  I have reviewed the chart and agree that the record reflects my personal performance and is accurate and complete. Katt Troy PA-C.  8:31 PM 03/14/2022    Clinical Impression:   Final diagnoses:  [M79.641] Right hand pain (Primary)          ED Disposition Condition    Discharge Stable        ED Prescriptions     Medication Sig Dispense Start Date End Date Auth. Provider     HYDROcodone-acetaminophen (NORCO) 5-325 mg per tablet Take 1 tablet by mouth every 8 (eight) hours as needed for Pain. 9 tablet 3/14/2022  Katt Troy PA-C        Follow-up Information     Follow up With Specialties Details Why Contact Info    Alexys Starkey MD Family Medicine   [address]  [facility] [address]  [phone]      Fabian Arredondo MD Orthopedic Surgery, Surgery, Sports Medicine   [address] [facility]  [address]  [facility] [address]  [phone]      [facility] Emergency Dept Emergency Medicine  As needed [address] [place] [id number] [phone]           Katt Troy PA-C  03/14/22 2032

## 2022-03-14 NOTE — TELEPHONE ENCOUNTER
Please inform patient that the MRI shows foraminal stenosis which is the narrowing through which the spinal nerves travel before exiting the spine. It can lead to those nerves becoming pinched, which cause numbness and tingling in the hands and fingers. Patient needs to schedule a visit with . Referral ordered on 3/7/22. I will send over tramadol to help with the pain. Ask patient does he want to be seen by PT/OT?

## 2022-03-14 NOTE — DISCHARGE INSTRUCTIONS
Take medication as prescribed. Follow up closely with orthopedics.  Do not take this pain medication with the ultram.   For worsening symptoms, chest pain, shortness of breath, increased abdominal pain, high grade fever, stroke or stroke like symptoms, immediately go to the nearest Emergency Room or call 911 as soon as possible.

## 2022-03-17 ENCOUNTER — PATIENT MESSAGE (OUTPATIENT)
Dept: FAMILY MEDICINE | Facility: CLINIC | Age: 67
End: 2022-03-17
Payer: OTHER GOVERNMENT

## 2022-03-18 RX ORDER — SILDENAFIL 50 MG/1
50 TABLET, FILM COATED ORAL DAILY PRN
Qty: 30 TABLET | Refills: 2 | Status: SHIPPED | OUTPATIENT
Start: 2022-03-18 | End: 2022-07-07

## 2022-03-18 NOTE — TELEPHONE ENCOUNTER
No new care gaps identified.  Powered by [employer]. Reference number: [id number].   3/18/2022 8:32:57 AM CDT

## 2022-03-24 PROBLEM — M50.10 CERVICAL DISC DISORDER WITH RADICULOPATHY: Status: ACTIVE | Noted: 2022-03-24

## 2022-03-25 ENCOUNTER — CLINICAL SUPPORT (OUTPATIENT)
Dept: REHABILITATION | Facility: HOSPITAL | Age: 67
End: 2022-03-25
Attending: NURSE PRACTITIONER
Payer: OTHER GOVERNMENT

## 2022-03-25 DIAGNOSIS — M50.10 CERVICAL DISC DISORDER WITH RADICULOPATHY: ICD-10-CM

## 2022-03-25 DIAGNOSIS — M53.82 DECREASED ROM OF INTERVERTEBRAL DISCS OF CERVICAL SPINE: ICD-10-CM

## 2022-03-25 DIAGNOSIS — R29.898 WEAKNESS OF BOTH UPPER EXTREMITIES: ICD-10-CM

## 2022-03-25 DIAGNOSIS — R29.3 POSTURE ABNORMALITY: ICD-10-CM

## 2022-03-25 PROCEDURE — 97110 THERAPEUTIC EXERCISES: CPT | Mod: PN | Performed by: PHYSICAL THERAPIST

## 2022-03-25 PROCEDURE — 97161 PT EVAL LOW COMPLEX 20 MIN: CPT | Mod: PN | Performed by: PHYSICAL THERAPIST

## 2022-03-25 PROCEDURE — 97012 MECHANICAL TRACTION THERAPY: CPT | Mod: PN | Performed by: PHYSICAL THERAPIST

## 2022-03-25 NOTE — PLAN OF CARE
"[facility]   Physical Therapy Initial Evaluation     Date: 3/25/2022   Name: Dustin Owens  Clinic Number: [id number]    Therapy Diagnosis:   Encounter Diagnosis   Name Primary?    Cervical disc disorder with radiculopathy      Physician: Noemi Salazar, *N.P.    Physician Orders: PT Eval and Treat   Medical Diagnosis from Referral: Cervical disc disorder with radiculopathy  Evaluation Date: 3/25/2022  Authorization Period Expiration: 3/16/2023  Plan of Care Expiration: 5/20/2022  Visit # / Visits authorized: 1/ 1   (POC 1/12)   FOTO Due visit 5  FOTO Due visit 10    Precautions: Standard  Insurance: Payor: [employer] / Plan: [employer] / Product Type: Government /     Time In: 1200  Time Out: 1245  Total Appointment Time (timed & untimed codes): 45 minutes      SUBJECTIVE   Date of onset: 2 weeks ago.    History of current condition - Dustin reports: he fell backward catching himself with his arms reaching back approximately 2 weeks ago. Two to three day later he began noticing bilateral cervical pain and symptoms into the right UE. He states the symptoms in the right UE are intermittent and can be burning or tingling. He complains of decreased and painful CROM. He reports difficulty with grasping and holding objects.    Falls: 1    Imaging, MRI studies: "1.  There is multilevel degenerative disc and facet disease.  There is some degree of disc space narrowing, disc osteophyte complex, uncovertebral spurring and/or facet joint arthropathy at multiple levels.  There is no significant spinal stenosis and there is no acute cord compression.  There is, however, multilevel foraminal stenosis.  The findings are discussed in detail by level above."    Prior Therapy: Physical therapist-back pain  Social History:  lives with their spouse  Occupation: Owns a cleaning business  Prior Level of Function: Independent  Current Level of Function: Decreased ability to perform ADL with " the right UE and decreased ability to drive due to lack of rotation    Pain:  Current 0/10, worst 9/10, best 0/10   Location: bilateral cervical and right upper extremity    Description: Burning and Tingling  Aggravating Factors: Lifting and grasping  Easing Factors: rest    Patients goals: Decreased pain and improve function of the right upper extremity      Medical History:   Past Medical History:   Diagnosis Date    Asthma     Personal history of colonic polyps 07/11/2018       Surgical History:   Dustin Owens  has no past surgical history on file.    Medications:   Dustin has a current medication list which includes the following prescription(s): atorvastatin, cetirizine, dexchlorphen-phenylephrine-dm, escitalopram oxalate, fluticasone propionate, gabapentin, hydrocodone-acetaminophen, sildenafil, and trazodone.    Allergies:   Review of patient's allergies indicates:   Allergen Reactions    Amoxicillin Diarrhea    Pollen, micronized Other (See Comments)          OBJECTIVE       Posture: Decreased lumbar lordosis and forward head in sitting  Palpation: Moderate point tenderness noted with palpation of the cervical paraspinals and right upper trapezius  Sensation: Intact    Range of Motion/Strength:      CERVICAL AROM Pain/Dysfunction with Movement   Flexion 32*    Extension 50*    Right side bending 36*    Left side bending 38*    Right rotation 44*    Left rotation 32      U/E MMT Left Right Pain/Dysfunction with Movement   Shoulder Flexion 5/5 4+/5    Shoulder Extension 5/5 4+/5    Shoulder Abduction 5/5 4+/5    Shoulder Adduction 5/5 4+/5    Shoulder IR 5/5 4+/5    Shoulder ER  @ 0* Abduction 4+/5 4-/5    Elbow Flexion  5/5 5/5    Elbow Extension 5/5 5/5    Rhomboids 4+/5 4/5       Strength Left Right   Long @ #2 position 55# 45#     Special Tests Left Right   Cervical compression negative    Spurling Test negative positive   Quadrant negative positive   Radial nerve tension negative negative    Ulnar nerve tension negative negative   Median nerve tension negative negative       Limitation/Restriction for FOTO  Survey    Therapist reviewed FOTO scores for Dustin Owens on 3/25/2022.   FOTO documents entered into [employer] - see Media section.    Limitation Score: 42%         TREATMENT     Total Treatment time (time-based codes) separate from Evaluation: 30 minutes      Dustin received the treatments listed below:      SUPERVISED MODALITIES after being cleared for contraindications:  Mechanical Traction:  Dustin received intermittent mechanical traction to the cervical spine at a force of 14 max/ 7 min pounds for a total of 15 minutes. Hold time of 30 seconds and rest time for 10  secondes. Patient tolerated treatment well without any adverse effects.     THERAPEUTIC EXERCISES to develop strength, ROM, flexibility and posture for 15 minutes including :     Anterior chest wall stretch 3 x 30 sec  Chin tucks 3 x 10  Shoulder extension with Green theraband  3 x 10  Shoulder adduction with Green theraband  3 x 10  Scapular retraction with Green theraband  3 x 10  Mid rows with Green theraband  3 x 10    PATIENT EDUCATION AND HOME EXERCISES     Education provided:   - Posture education    Written Home Exercises Provided: yes. Exercises were reviewed and Dustin was able to demonstrate them prior to the end of the session.  Dustin demonstrated good  understanding of the education provided. See EMR under Patient Instructions for exercises provided during therapy sessions.    ASSESSMENT     Dustin is a 66 y.o. male referred to outpatient Physical Therapy with a medical diagnosis of Cervical disc disorder with radiculopathy. Patient presents with     1. Decreased cervical range of motion    2. Decreased posterior shoulder strength  3. Posture abnormality  4. Decreased ability to perform activity of daily living with the right upper extremity.    Patient prognosis is Good.   Patientt will benefit from skilled outpatient  Physical Therapy to address the deficits stated above and in the chart below, provide patient /family education, and to maximize patientt's level of independence.     Plan of care discussed with patient: Yes  Patient's spiritual, cultural and educational needs considered and patient is agreeable to the plan of care and goals as stated below:     Anticipated Barriers for therapy: none    Medical Necessity is demonstrated by the following  History  Co-morbidities and personal factors that may impact the plan of care Co-morbidities:   history of colonic polyps    Personal Factors:   no deficits     low   Examination  Body Structures and Functions, activity limitations and participation restrictions that may impact the plan of care Body Regions:   neck  upper extremities    Body Systems:    ROM  strength    Participation Restrictions:   none    Activity limitations:   Learning and applying knowledge  no deficits    General Tasks and Commands  no deficits    Communication  no deficits    Mobility  lifting and carrying objects  driving (bike, car, motorcycle)    Self care  no deficits    Domestic Life  no deficits    Interactions/Relationships  no deficits    Life Areas  no deficits    Community and Social Life  no deficits         moderate   Clinical Presentation stable and uncomplicated low   Decision Making/ Complexity Score: low     Goals:  Short Term Goals (STG) # weeks Goal Review Date Reviewed Date Met   1. Patient will be begin a written HEP 1 Initial 3/25/2022    2. Decrease soft tissue tenderness to mild 3 Initial 3/25/2022    3. Decrease pain at worst to 5/10  3 Initial 3/25/2022    4. Increase cervical range of motion to 60* flexion and extension.  3 Initial 3/25/2022      Long Term Goals (LTG) # weeks Goal Review Date Reviewed Date Met   1. Patient will be independent with a written HEP 6 Initial 3/25/2022    2. Increase  strength to 55# 6 Initial 3/25/2022    3. Decrease pain at worst to 2/10 6 Initial  3/25/2022    4. Decrease FOTO score to 42% 6 Initial 3/25/2022    5. Increase cervical range of motion  to WFL 6 Initial 3/25/2022        PLAN   Plan of care Certification: 3/25/2022 to 5/20/2022.    Outpatient Physical Therapy 2 times weekly for 6 weeks to include the following interventions: Cervical/Lumbar Traction, Electrical Stimulation IMS/IFC, Manual Therapy, Neuromuscular Re-ed, Patient Education, Therapeutic Activities, Therapeutic Exercise and Dry Needling.     Jani Badillo, PT      I CERTIFY THE NEED FOR THESE SERVICES FURNISHED UNDER THIS PLAN OF TREATMENT AND WHILE UNDER MY CARE   Physician's comments:     Physician's Signature: ___________________________________________________

## 2022-03-28 ENCOUNTER — PATIENT MESSAGE (OUTPATIENT)
Dept: FAMILY MEDICINE | Facility: CLINIC | Age: 67
End: 2022-03-28
Payer: OTHER GOVERNMENT

## 2022-03-28 NOTE — TELEPHONE ENCOUNTER
Talked to patient and Dr. Kennedy office, authorization is pending due to insurance.     Insurance will need to be called to get this authorized

## 2022-03-29 ENCOUNTER — CLINICAL SUPPORT (OUTPATIENT)
Dept: REHABILITATION | Facility: HOSPITAL | Age: 67
End: 2022-03-29
Payer: OTHER GOVERNMENT

## 2022-03-29 DIAGNOSIS — M50.10 CERVICAL DISC DISORDER WITH RADICULOPATHY: ICD-10-CM

## 2022-03-29 DIAGNOSIS — M53.82 DECREASED ROM OF INTERVERTEBRAL DISCS OF CERVICAL SPINE: ICD-10-CM

## 2022-03-29 DIAGNOSIS — R29.898 WEAKNESS OF BOTH UPPER EXTREMITIES: ICD-10-CM

## 2022-03-29 DIAGNOSIS — R29.3 POSTURE ABNORMALITY: Primary | ICD-10-CM

## 2022-03-29 PROCEDURE — 97112 NEUROMUSCULAR REEDUCATION: CPT | Mod: PN | Performed by: PHYSICAL THERAPIST

## 2022-03-29 PROCEDURE — 97110 THERAPEUTIC EXERCISES: CPT | Mod: PN | Performed by: PHYSICAL THERAPIST

## 2022-03-29 PROCEDURE — 97012 MECHANICAL TRACTION THERAPY: CPT | Mod: PN | Performed by: PHYSICAL THERAPIST

## 2022-03-29 NOTE — PROGRESS NOTES
[facility]   Physical Therapy Treatment Note     Name: Dustin Owens  Clinic Number: [id number]    Therapy Diagnosis:   Encounter Diagnoses   Name Primary?    Posture abnormality Yes    Weakness of both upper extremities     Cervical disc disorder with radiculopathy     Decreased ROM of intervertebral discs of cervical spine      Physician: Noemi Salazar, *    Visit Date: 3/29/2022    Physician Orders: PT Eval and Treat   Medical Diagnosis from Referral: Cervical disc disorder with radiculopathy  Evaluation Date: 3/25/2022  Authorization Period Expiration: 3/16/2023  Plan of Care Expiration: 5/20/2022  Visit # / Visits authorized: 1/ 15   (POC 2/12)   FOTO Due visit 5  FOTO Due visit 10        Time In: 1345  Time Out: 1430  Total Billable Time: 45 minutes    PTA Visit #: 0/5     Precautions: Standard    Insurance: Payor: [employer] / Plan: [email or web address] [place] / Product Type: Government /     SUBJECTIVE     Pt reports: neck is doing a little better but still having tingling in the hand.  He was compliant with home exercise program.  Response to previous treatment: no complaints  Functional change: first visit since evaluation    Pain: 3/10  Location: bilateral cervical and right upper extremity     OBJECTIVE     Objective Measures updated at progress report unless specified.     Treatment       Dustin received the treatments listed below:     SUPERVISED MODALITIES after being cleared for contraindications:  Mechanical Traction:  Dustin received intermittent mechanical traction to the cervical spine at a force of 14 max/ 7 min pounds for a total of 15 minutes. Hold time of 30 seconds and rest time for 10  secondes. Patient tolerated treatment well without any adverse effects.         THERAPEUTIC EXERCISES to develop strength and posture for 15 minutes including :     Mulligan mobilizations with movement C6 /C7  With left and right rotation and overpressure x 5 min    Seated lumbar  extension 3 x 10 with 1/2 roll  Chin tucks 3 x 10      NEUROMUSCULAR RE-EDUCATION ACTIVITIES to improve Kinesthetic, Sense and Posture for 15 minutes.  The following were included: :.     Shoulder extension with Green sport cord  3 x 10 B  Shoulder adduction with Green sport cord  3 x 10 B  Scapular retraction with Green sport cord  3 x 10  Mid rows with Green sport cord  3 x 10      Patient Education and Home Exercises     Home Exercises Provided and Patient Education Provided     Education provided:   - Posture education    Written Home Exercises Provided: Patient instructed to cont prior HEP. Exercises were reviewed and Dustin was able to demonstrate them prior to the end of the session.  Dustin demonstrated good  understanding of the education provided. See EMR under Patient Instructions for exercises provided during therapy sessions    ASSESSMENT     Patient displayed improved cervical range of motion following MWM. Patient tolerated treatment well without report of adverse effects. Treatment focused on strengthening to prevent forward shoulder posturing.      Dustin Is progressing well towards his goals.   Pt prognosis is Good.     Pt will continue to benefit from skilled outpatient physical therapy to address the deficits listed in the problem list box on initial evaluation, provide pt/family education and to maximize pt's level of independence in the home and community environment.     Pt's spiritual, cultural and educational needs considered and pt agreeable to plan of care and goals.     Anticipated barriers to physical therapy: none    Goals:   Short Term Goals (STG) # weeks Goal Review Date Reviewed Date Met   1. Patient will be begin a written HEP 1 progressing 3/29/2022       2. Decrease soft tissue tenderness to mild 3 progressing 3/29/2022       3. Decrease pain at worst to 5/10  3 progressing 3/29/2022       4. Increase cervical range of motion to 60* flexion and extension.  3 progressing  3/29/2022          Long Term Goals (LTG) # weeks Goal Review Date Reviewed Date Met   1. Patient will be independent with a written HEP 6 progressing 3/29/2022       2. Increase  strength to 55# 6 progressing 3/29/2022       3. Decrease pain at worst to 2/10 6 progressing 3/29/2022       4. Decrease FOTO score to 42% 6 progressing 3/29/2022       5. Increase cervical range of motion  to WFL 6 progressing 3/29/2022          PLAN     Continue with physical therapy as per plan of care      Jani Badillo, PT

## 2022-04-04 ENCOUNTER — OFFICE VISIT (OUTPATIENT)
Dept: ORTHOPEDICS | Facility: CLINIC | Age: 67
End: 2022-04-04
Payer: OTHER GOVERNMENT

## 2022-04-04 VITALS — WEIGHT: 180 LBS | HEIGHT: 69 IN | BODY MASS INDEX: 26.66 KG/M2

## 2022-04-04 DIAGNOSIS — M50.30 DEGENERATIVE CERVICAL DISC: Primary | ICD-10-CM

## 2022-04-04 DIAGNOSIS — M48.02 FORAMINAL STENOSIS OF CERVICAL REGION: ICD-10-CM

## 2022-04-04 DIAGNOSIS — M47.812 ARTHROPATHY OF CERVICAL FACET JOINT: ICD-10-CM

## 2022-04-04 PROCEDURE — 99213 OFFICE O/P EST LOW 20 MIN: CPT | Mod: S$GLB,,, | Performed by: ORTHOPAEDIC SURGERY

## 2022-04-04 PROCEDURE — 99213 PR OFFICE/OUTPT VISIT, EST, LEVL III, 20-29 MIN: ICD-10-PCS | Mod: S$GLB,,, | Performed by: ORTHOPAEDIC SURGERY

## 2022-04-04 RX ORDER — GABAPENTIN 300 MG/1
300 CAPSULE ORAL 3 TIMES DAILY
Qty: 90 CAPSULE | Refills: 1 | Status: SHIPPED | OUTPATIENT
Start: 2022-04-04 | End: 2022-05-27

## 2022-04-04 RX ORDER — METHYLPREDNISOLONE 4 MG/1
TABLET ORAL
Qty: 1 EACH | Refills: 0 | Status: SHIPPED | OUTPATIENT
Start: 2022-04-04 | End: 2022-05-03

## 2022-04-04 RX ORDER — HYDROCODONE BITARTRATE AND ACETAMINOPHEN 7.5; 325 MG/1; MG/1
1 TABLET ORAL EVERY 8 HOURS PRN
Qty: 21 TABLET | Refills: 0 | Status: SHIPPED | OUTPATIENT
Start: 2022-04-04 | End: 2022-04-11

## 2022-04-04 NOTE — PROGRESS NOTES
Subjective:       Patient ID: Dustin Owens is a 66 y.o. male.    Chief Complaint: Pain of the Neck (Pt is here with complaints of neck pain that radiates into right arm to fingers with severe numbness and tingling x 3 weeks, slipped and caught himself a week prior to the symptoms starting, pain prevents him from sleep w/o rx meds)      History of Present Illness    Prior to meeting with the patient I reviewed the medical chart in [facility]. This included reviewing the previous progress notes from our office, review of the patient's last appointment with their primary care provider, review of any visits to the emergency room, and review of any pain management appointments or procedures.   Patient presents with complaints of neck pain with numbness and radiating down his arm to his fingers the symptoms have been going on a month at the fell down no bowel or bladder dysfunction he was given gabapentin 100 mg and also some tramadol no bowel or bladder dysfunction no left-sided complaints    Current Medications  Current Outpatient Medications   Medication Sig Dispense Refill    atorvastatin (LIPITOR) 10 MG tablet Take 10 mg by mouth once daily.      cetirizine (ZYRTEC) 10 MG tablet Take 1 tablet (10 mg total) by mouth once daily. 30 tablet 0    dexchlorphen-phenylephrine-DM 1-5-10 mg/5 mL Syrp Take 5 mLs by mouth every 4 (four) hours as needed (cough). 240 mL 0    EScitalopram oxalate (LEXAPRO) 20 MG tablet Take 1 tablet (20 mg total) by mouth once daily. 90 tablet 0    fluticasone propionate (FLONASE) 50 mcg/actuation nasal spray 1 spray (50 mcg total) by Each Nostril route once daily. 15.8 mL 2    HYDROcodone-acetaminophen (NORCO) 5-325 mg per tablet Take 1 tablet by mouth every 8 (eight) hours as needed for Pain. 9 tablet 0    sildenafiL (VIAGRA) 50 MG tablet Take 1 tablet (50 mg total) by mouth daily as needed for Erectile Dysfunction. 30 tablet 2    traZODone (DESYREL) 50 MG tablet Take 1 tablet (50 mg total)  by mouth every evening. 90 tablet 0    gabapentin (NEURONTIN) 300 MG capsule Take 1 capsule (300 mg total) by mouth 3 (three) times daily. 90 capsule 1    HYDROcodone-acetaminophen (NORCO) 7.5-325 mg per tablet Take 1 tablet by mouth every 8 (eight) hours as needed for Pain. 21 tablet 0    methylPREDNISolone (MEDROL DOSEPACK) 4 mg tablet use as directed 1 each 0     No current facility-administered medications for this visit.       Allergies  Review of patient's allergies indicates:   Allergen Reactions    Amoxicillin Diarrhea    Pollen, micronized Other (See Comments)       Past Medical History  Past Medical History:   Diagnosis Date    Asthma     Personal history of colonic polyps 07/11/2018       Surgical History  History reviewed. No pertinent surgical history.    Family History:   Family History   Problem Relation Age of Onset    Cancer Mother     Diabetes Father        Social History:   Social History     Socioeconomic History    Marital status:    Tobacco Use    Smoking status: Former Smoker    Smokeless tobacco: Never Used       Hospitalization/Major Diagnostic Procedure:     Review of Systems     General/Constitutional:  Chills denies. Fatigue denies. Fever denies. Weight gain denies. Weight loss denies.    Respiratory:  Shortness of breath denies.    Cardiovascular:  Chest pain denies.    Gastrointestinal:  Constipation denies. Diarrhea denies. Nausea denies. Vomiting denies.     Hematology:  Easy bruising denies. Prolonged bleeding denies.     Genitourinary:  Frequent urination denies. Pain in lower back denies. Painful urination denies.     Musculoskeletal:  See HPI for details    Skin:  Rash denies.    Neurologic:  Dizziness denies. Gait abnormalities denies. Seizures denies. Tingling/Numbess denies.    Psychiatric:  Anxiety denies. Depressed mood denies.     Objective:   Vital Signs: There were no vitals filed for this visit.     Physical Exam      General Examination:      Constitutional: The patient is alert and oriented to lace person and time. Mood is pleasant.     Head/Face: Normal facial features normal eyebrows    Eyes: Normal extraocular motion bilaterally    Lungs: Respirations are equal and unlabored    Gait is coordinated.    Cardiovascular: There are no swelling or varicosities present.    Lymphatic: Negative for adenopathy    Skin: Normal    Neurological: Level of consciousness normal. Oriented to place person and time and situation    Psychiatric: Oriented to time place person and situation    Moderate tenderness right trapezius and levator scapulae muscle cervical range of motion is moderately restricted Spurling's maneuver weakly positive on right side subjective numbness C6 nerve root distribution reflexes symmetrical  strength good  XRAY Report/ Interpretation:  MRI cervical spine performed March 9, 2022 was personally reviewed there is evidence of multilevel degenerative disc disease and facet joint arthritis with varying degrees of disc space narrowing and varying degrees of multilevel foraminal stenosis      Assessment:       1. Degenerative cervical disc    2. Arthropathy of cervical facet joint    3. Foraminal stenosis of cervical region        Plan:       Dustin was seen today for pain.    Diagnoses and all orders for this visit:    Degenerative cervical disc  -     Cancel: X-Ray Cervical Spine AP And Lateral  -     methylPREDNISolone (MEDROL DOSEPACK) 4 mg tablet; use as directed  -     gabapentin (NEURONTIN) 300 MG capsule; Take 1 capsule (300 mg total) by mouth 3 (three) times daily.  -     HYDROcodone-acetaminophen (NORCO) 7.5-325 mg per tablet; Take 1 tablet by mouth every 8 (eight) hours as needed for Pain.    Arthropathy of cervical facet joint  -     methylPREDNISolone (MEDROL DOSEPACK) 4 mg tablet; use as directed  -     gabapentin (NEURONTIN) 300 MG capsule; Take 1 capsule (300 mg total) by mouth 3 (three) times daily.  -      HYDROcodone-acetaminophen (NORCO) 7.5-325 mg per tablet; Take 1 tablet by mouth every 8 (eight) hours as needed for Pain.    Foraminal stenosis of cervical region  -     methylPREDNISolone (MEDROL DOSEPACK) 4 mg tablet; use as directed  -     gabapentin (NEURONTIN) 300 MG capsule; Take 1 capsule (300 mg total) by mouth 3 (three) times daily.  -     HYDROcodone-acetaminophen (NORCO) 7.5-325 mg per tablet; Take 1 tablet by mouth every 8 (eight) hours as needed for Pain.         Follow up in about 2 weeks (around 4/18/2022) for cervical follow up.    Treatment options were discussed.  Your preferred conservative treatment.  AP screw prescription plan has been outlined involving a trial of a steroid Dosepak increasing his gabapentin and analgesics for pain should this be unsuccessful he will then consider being referred to pain management for an epidural steroid injection.      He may need an EMG nerve conduction study performed in the future since the MRI does not show a clear-cut symptomatic pain generating level in my opinion    Treatment options were discussed with regards to the nature of the medical condition. Conservative pain intervention and surgical options were discussed in detail. The probability of success of each separate treatment option was discussed. The patient expressed a clear understanding of the treatment options. With regards to surgery, the procedure risk, benefits, complications, and outcomes were discussed. No guarantees were given with regards to surgical outcome.   The risk of complications, morbidity, and mortality of patient management decisions have been made at the time of this visit. These are associated with the patient's problems, diagnostic procedures and treatment options. This includes the possible management options selected and those considered but not selected by the patient after shared medical decision making we discussed with the patient.     This note was created using Dragon  voice recognition software that occasionally misinterpreted phrases or words.

## 2022-04-06 ENCOUNTER — CLINICAL SUPPORT (OUTPATIENT)
Dept: REHABILITATION | Facility: HOSPITAL | Age: 67
End: 2022-04-06
Payer: OTHER GOVERNMENT

## 2022-04-06 DIAGNOSIS — R29.898 WEAKNESS OF BOTH UPPER EXTREMITIES: ICD-10-CM

## 2022-04-06 DIAGNOSIS — M50.10 CERVICAL DISC DISORDER WITH RADICULOPATHY: ICD-10-CM

## 2022-04-06 DIAGNOSIS — R29.3 POSTURE ABNORMALITY: Primary | ICD-10-CM

## 2022-04-06 DIAGNOSIS — M53.82 DECREASED ROM OF INTERVERTEBRAL DISCS OF CERVICAL SPINE: ICD-10-CM

## 2022-04-06 PROCEDURE — 97012 MECHANICAL TRACTION THERAPY: CPT | Mod: PN | Performed by: PHYSICAL THERAPIST

## 2022-04-06 PROCEDURE — 97112 NEUROMUSCULAR REEDUCATION: CPT | Mod: PN | Performed by: PHYSICAL THERAPIST

## 2022-04-06 NOTE — PROGRESS NOTES
[facility]   Physical Therapy Treatment Note     Name: Dustin Owens  Clinic Number: [id number]    Therapy Diagnosis:   Encounter Diagnoses   Name Primary?    Posture abnormality Yes    Weakness of both upper extremities     Cervical disc disorder with radiculopathy     Decreased ROM of intervertebral discs of cervical spine      Physician: Noemi Salazar, *    Visit Date: 4/6/2022    Physician Orders: PT Eval and Treat   Medical Diagnosis from Referral: Cervical disc disorder with radiculopathy  Evaluation Date: 3/25/2022  Authorization Period Expiration: 3/16/2023  Plan of Care Expiration: 5/20/2022  Visit # / Visits authorized: 1/ 15   (POC 2/12)   FOTO Due visit 5  FOTO Due visit 10        Time In: 1300  Time Out: 1330  Total Billable Time: 30 minutes    PTA Visit #: 0/5     Precautions: Standard    Insurance: Payor: [employer] / Plan: [employer] [place] / Product Type: Government /     SUBJECTIVE     Pt reports: decreased cervical pain. Numbness is just in finger tips now.  He was compliant with home exercise program.  Response to previous treatment: no complaints  Functional change: first visit since evaluation    Pain: 3/10  Location: bilateral cervical and right upper extremity     OBJECTIVE     Objective Measures updated at progress report unless specified.     Treatment       Dustin received the treatments listed below:     SUPERVISED MODALITIES after being cleared for contraindications:  Mechanical Traction:  Dustin received intermittent mechanical traction to the cervical spine at a force of 14 max/ 7 min pounds for a total of 15 minutes. Hold time of 30 seconds and rest time for 10  secondes. Patient tolerated treatment well without any adverse effects.         THERAPEUTIC EXERCISES to develop strength, flexibility and posture for 0 minutes including :     Mulligan mobilizations with movement C6 /C7  With left and right rotation and overpressure x 5  min          NEUROMUSCULAR RE-EDUCATION ACTIVITIES to improve Kinesthetic, Sense and Posture for 15 minutes.  The following were included: :.   During 15 minutes of therapeutic exercise, Dustin, was supervised by a rehabilitation technician under the direction of the treating therapist.      Seated lumbar extension 3 x 10 with 1/2 roll  Chin tucks 3 x 10    Shoulder extension with Green sport cord  3 x 10 B  Shoulder adduction with Green sport cord  3 x 10 B  Scapular retraction with Green sport cord  3 x 10  Mid rows with Green sport cord  3 x 10      Patient Education and Home Exercises     Home Exercises Provided and Patient Education Provided     Education provided:   - Posture education    Written Home Exercises Provided: Patient instructed to cont prior HEP. Exercises were reviewed and Dustin was able to demonstrate them prior to the end of the session.  Dustin demonstrated good  understanding of the education provided. See EMR under Patient Instructions for exercises provided during therapy sessions    ASSESSMENT     Patient presents with decreased numbness/tingling in the hands, now only in finger tips. Patient tolerated treatment well without report of adverse effects. Treatment focused on strengthening to prevent forward shoulder posturing.      Dustin Is progressing well towards his goals.   Pt prognosis is Good.     Pt will continue to benefit from skilled outpatient physical therapy to address the deficits listed in the problem list box on initial evaluation, provide pt/family education and to maximize pt's level of independence in the home and community environment.     Pt's spiritual, cultural and educational needs considered and pt agreeable to plan of care and goals.     Anticipated barriers to physical therapy: none    Goals:   Short Term Goals (STG) # weeks Goal Review Date Reviewed Date Met   1. Patient will be begin a written HEP 1 progressing 4/6/2022       2. Decrease soft tissue tenderness to  mild 3 progressing 4/6/2022       3. Decrease pain at worst to 5/10  3 progressing 4/6/2022       4. Increase cervical range of motion to 60* flexion and extension.  3 progressing 4/6/2022          Long Term Goals (LTG) # weeks Goal Review Date Reviewed Date Met   1. Patient will be independent with a written HEP 6 progressing 4/6/2022       2. Increase  strength to 55# 6 progressing 4/6/2022       3. Decrease pain at worst to 2/10 6 progressing 4/6/2022       4. Decrease FOTO score to 42% 6 progressing 4/6/2022       5. Increase cervical range of motion  to WFL 6 progressing 4/6/2022          PLAN     Continue with physical therapy as per plan of care      Jani Badillo, PT

## 2022-04-11 ENCOUNTER — PATIENT MESSAGE (OUTPATIENT)
Dept: ORTHOPEDICS | Facility: CLINIC | Age: 67
End: 2022-04-11

## 2022-04-11 DIAGNOSIS — M50.30 DEGENERATIVE CERVICAL DISC: Primary | ICD-10-CM

## 2022-04-11 DIAGNOSIS — M47.812 ARTHROPATHY OF CERVICAL FACET JOINT: ICD-10-CM

## 2022-04-11 DIAGNOSIS — M48.02 FORAMINAL STENOSIS OF CERVICAL REGION: ICD-10-CM

## 2022-04-12 ENCOUNTER — CLINICAL SUPPORT (OUTPATIENT)
Dept: REHABILITATION | Facility: HOSPITAL | Age: 67
End: 2022-04-12
Payer: OTHER GOVERNMENT

## 2022-04-12 DIAGNOSIS — M50.10 CERVICAL DISC DISORDER WITH RADICULOPATHY: ICD-10-CM

## 2022-04-12 DIAGNOSIS — M53.82 DECREASED ROM OF INTERVERTEBRAL DISCS OF CERVICAL SPINE: ICD-10-CM

## 2022-04-12 DIAGNOSIS — R29.3 POSTURE ABNORMALITY: Primary | ICD-10-CM

## 2022-04-12 DIAGNOSIS — R29.898 WEAKNESS OF BOTH UPPER EXTREMITIES: ICD-10-CM

## 2022-04-12 PROCEDURE — 97012 MECHANICAL TRACTION THERAPY: CPT | Mod: PN | Performed by: PHYSICAL THERAPIST

## 2022-04-12 PROCEDURE — 97112 NEUROMUSCULAR REEDUCATION: CPT | Mod: PN | Performed by: PHYSICAL THERAPIST

## 2022-04-12 NOTE — PROGRESS NOTES
[facility]   Physical Therapy Treatment Note     Name: Dustin Owens  Clinic Number: [id number]    Therapy Diagnosis:   Encounter Diagnoses   Name Primary?    Posture abnormality Yes    Weakness of both upper extremities     Cervical disc disorder with radiculopathy     Decreased ROM of intervertebral discs of cervical spine      Physician: Noemi Salazar, *    Visit Date: 4/12/2022    Physician Orders: PT Eval and Treat   Medical Diagnosis from Referral: Cervical disc disorder with radiculopathy  Evaluation Date: 3/25/2022  Authorization Period Expiration: 3/16/2023  Plan of Care Expiration: 5/20/2022  Visit # / Visits authorized: 5/ 15   (POC 5/12)   FOTO Due visit 5  FOTO Due visit 10        Time In: 1430  Time Out: 1515  Total Billable Time: 45 minutes    PTA Visit #: 0/5     Precautions: Standard    Insurance: Payor: [employer] / Plan: [employer] / Product Type: Government /     SUBJECTIVE     Pt reports: decreased cervical pain. Numbness digits 1-3  He was compliant with home exercise program.  Response to previous treatment: no complaints  Functional change: first visit since evaluation    Pain: 3/10  Location: bilateral cervical and right upper extremity     OBJECTIVE     Objective Measures updated at progress report unless specified.     Treatment       Dustin received the treatments listed below:     SUPERVISED MODALITIES after being cleared for contraindications:  Mechanical Traction:  Dustin received intermittent mechanical traction to the cervical spine at a force of 14 max/ 7 min pounds for a total of 15 minutes. Hold time of 30 seconds and rest time for 10  secondes. Patient tolerated treatment well without any adverse effects.         THERAPEUTIC EXERCISES to develop strength, flexibility and posture for 0 minutes including :     Mulligan mobilizations with movement C6 /C7  With left and right rotation and overpressure x 5 min          NEUROMUSCULAR  RE-EDUCATION ACTIVITIES to improve Kinesthetic, Sense and Posture for 30 minutes.  The following were included: :.      Seated lumbar extension 3 x 10 with 1/2 roll  Chin tucks 3 x 10    Shoulder extension with Green sport cord  3 x 10 B  Shoulder adduction with Green sport cord  3 x 10 B  Scapular retraction with Green sport cord  3 x 10  Mid rows with Green sport cord  3 x 10  Standing shoulder flexion with 4 inch bolster 3 x 10    Prone bilateral shoulder extension 3 x 10          Patient Education and Home Exercises     Home Exercises Provided and Patient Education Provided     Education provided:   - Posture education    Written Home Exercises Provided: Patient instructed to cont prior HEP. Exercises were reviewed and Dustin was able to demonstrate them prior to the end of the session.  Dustin demonstrated good  understanding of the education provided. See EMR under Patient Instructions for exercises provided during therapy sessions    ASSESSMENT     Patient presents with decreased numbness/tingling in the hands, now only in finger tips. He is being referred for a nerve conduction study and possible injection with pain management. Patient tolerated treatment well without report of adverse effects. Treatment focused on strengthening to prevent forward shoulder posturing.      Dustin Is progressing well towards his goals.   Pt prognosis is Good.     Pt will continue to benefit from skilled outpatient physical therapy to address the deficits listed in the problem list box on initial evaluation, provide pt/family education and to maximize pt's level of independence in the home and community environment.     Pt's spiritual, cultural and educational needs considered and pt agreeable to plan of care and goals.     Anticipated barriers to physical therapy: none    Goals:   Short Term Goals (STG) # weeks Goal Review Date Reviewed Date Met   1. Patient will be begin a written HEP 1 progressing 4/12/2022       2. Decrease  soft tissue tenderness to mild 3 progressing 4/12/2022       3. Decrease pain at worst to 5/10  3 progressing 4/12/2022       4. Increase cervical range of motion to 60* flexion and extension.  3 progressing 4/12/2022          Long Term Goals (LTG) # weeks Goal Review Date Reviewed Date Met   1. Patient will be independent with a written HEP 6 progressing 4/12/2022       2. Increase  strength to 55# 6 progressing 4/12/2022       3. Decrease pain at worst to 2/10 6 progressing 4/12/2022       4. Decrease FOTO score to 42% 6 progressing 4/12/2022       5. Increase cervical range of motion  to WFL 6 progressing 4/12/2022          PLAN     Continue with physical therapy as per plan of care      Jani Badillo, PT

## 2022-04-22 ENCOUNTER — CLINICAL SUPPORT (OUTPATIENT)
Dept: REHABILITATION | Facility: HOSPITAL | Age: 67
End: 2022-04-22
Payer: OTHER GOVERNMENT

## 2022-04-22 DIAGNOSIS — M50.10 CERVICAL DISC DISORDER WITH RADICULOPATHY: ICD-10-CM

## 2022-04-22 DIAGNOSIS — R29.3 POSTURE ABNORMALITY: Primary | ICD-10-CM

## 2022-04-22 DIAGNOSIS — M53.82 DECREASED ROM OF INTERVERTEBRAL DISCS OF CERVICAL SPINE: ICD-10-CM

## 2022-04-22 DIAGNOSIS — R29.898 WEAKNESS OF BOTH UPPER EXTREMITIES: ICD-10-CM

## 2022-04-22 PROCEDURE — 97012 MECHANICAL TRACTION THERAPY: CPT | Mod: PN,CQ

## 2022-04-22 PROCEDURE — 97110 THERAPEUTIC EXERCISES: CPT | Mod: PN,CQ

## 2022-04-22 NOTE — PROGRESS NOTES
[facility]   Physical Therapy Treatment Note     Name: Dustin Owens  Clinic Number: [id number]    Therapy Diagnosis:   Encounter Diagnoses   Name Primary?    Posture abnormality Yes    Weakness of both upper extremities     Cervical disc disorder with radiculopathy     Decreased ROM of intervertebral discs of cervical spine      Physician: Noemi Salazar, *    Visit Date: 4/22/2022    Physician Orders: PT Eval and Treat   Medical Diagnosis from Referral: Cervical disc disorder with radiculopathy  Evaluation Date: 3/25/2022  Authorization Period Expiration: 3/16/2023  Plan of Care Expiration: 5/20/2022  Visit # / Visits authorized: 5/ 15   (POC 5/12)   FOTO Due visit 5  FOTO Due visit 10        Time In: 1400  Time Out: 1455  Total Billable Time: 55 minutes    PTA Visit #: 1/5     Precautions: Standard    Insurance: Payor: [employer] / Plan: [employer] / Product Type: Government /     SUBJECTIVE     Pt reports:Pt continues to c/o of decreased sensation in digits 1-3.   He was compliant with home exercise program.  Response to previous treatment: no complaints  Functional change: first visit since evaluation    Pain: 3/10  Location: bilateral cervical and right upper extremity     OBJECTIVE     Objective Measures updated at progress report unless specified.     Treatment       Dustin received the treatments listed below:     SUPERVISED MODALITIES after being cleared for contraindications:  Mechanical Traction:  Dustin received intermittent mechanical traction to the cervical spine at a force of 15 max/ 7 min pounds for a total of 15 minutes. Hold time of 30 seconds and rest time for 10  secondes. Patient tolerated treatment well without any adverse effects.         THERAPEUTIC EXERCISES to develop strength, flexibility and posture for 0 minutes including :     Mulligan mobilizations with movement C6 /C7  With left and right rotation and overpressure x 5  min          NEUROMUSCULAR RE-EDUCATION ACTIVITIES to improve Kinesthetic, Sense and Posture for 45 minutes.  The following were included: :.      Seated lumbar extension 3 x 10 with 1/2 roll  Chin tucks 3 x 10    Thoracic extensions, 3 x 10   Self PA glad at CT junction with cervical extension, 2 x 10     Shoulder extension with Green sport cord  3 x 10 B  Shoulder adduction with Green sport cord  3 x 10 B  Scapular retraction with Green sport cord  3 x 10  Mid rows with Green sport cord  3 x 10- Not performed seconary to time   Standing shoulder flexion with 4 inch bolster 3 x 10    Prone bilateral shoulder extension 3 x 10          Patient Education and Home Exercises     Home Exercises Provided and Patient Education Provided     Education provided:   - Posture education    Written Home Exercises Provided: Patient instructed to cont prior HEP. Exercises were reviewed and Dustin was able to demonstrate them prior to the end of the session.  Dustin demonstrated good  understanding of the education provided. See EMR under Patient Instructions for exercises provided during therapy sessions    ASSESSMENT     Patient continues to present with  numbness/tingling in  in finger tips. Thoracic mobility exercises performed today with good tolerance. Pt with good tolerance to therapy session with no adverse effects reported.          Dustin Is progressing well towards his goals.   Pt prognosis is Good.     Pt will continue to benefit from skilled outpatient physical therapy to address the deficits listed in the problem list box on initial evaluation, provide pt/family education and to maximize pt's level of independence in the home and community environment.     Pt's spiritual, cultural and educational needs considered and pt agreeable to plan of care and goals.     Anticipated barriers to physical therapy: none    Goals:   Short Term Goals (STG) # weeks Goal Review Date Reviewed Date Met   1. Patient will be begin a written  HEP 1 progressing 4/22/2022       2. Decrease soft tissue tenderness to mild 3 progressing 4/22/2022       3. Decrease pain at worst to 5/10  3 progressing 4/22/2022       4. Increase cervical range of motion to 60* flexion and extension.  3 progressing 4/22/2022          Long Term Goals (LTG) # weeks Goal Review Date Reviewed Date Met   1. Patient will be independent with a written HEP 6 progressing 4/22/2022       2. Increase  strength to 55# 6 progressing 4/22/2022       3. Decrease pain at worst to 2/10 6 progressing 4/22/2022       4. Decrease FOTO score to 42% 6 progressing 4/22/2022       5. Increase cervical range of motion  to WFL 6 progressing 4/22/2022          PLAN     Continue with physical therapy as per plan of care      Colin Brooks PTA

## 2022-04-29 ENCOUNTER — TELEPHONE (OUTPATIENT)
Dept: PHYSICAL MEDICINE AND REHAB | Facility: CLINIC | Age: 67
End: 2022-04-29
Payer: OTHER GOVERNMENT

## 2022-04-29 DIAGNOSIS — M50.10 CERVICAL DISC DISORDER WITH RADICULOPATHY: ICD-10-CM

## 2022-04-29 DIAGNOSIS — M48.02 FORAMINAL STENOSIS OF CERVICAL REGION: ICD-10-CM

## 2022-04-29 DIAGNOSIS — M50.30 DEGENERATIVE CERVICAL DISC: ICD-10-CM

## 2022-04-29 DIAGNOSIS — M47.812 ARTHROPATHY OF CERVICAL FACET JOINT: ICD-10-CM

## 2022-04-29 PROCEDURE — 95911 NRV CNDJ TEST 9-10 STUDIES: CPT | Mod: PBBFAC,PN | Performed by: PHYSICAL MEDICINE & REHABILITATION

## 2022-04-29 PROCEDURE — 99499 NO LOS: ICD-10-PCS | Mod: S$PBB,,, | Performed by: PHYSICAL MEDICINE & REHABILITATION

## 2022-04-29 PROCEDURE — 95886 MUSC TEST DONE W/N TEST COMP: CPT | Mod: PBBFAC,PN | Performed by: PHYSICAL MEDICINE & REHABILITATION

## 2022-04-29 PROCEDURE — 95886 MUSC TEST DONE W/N TEST COMP: CPT | Mod: 26,S$PBB,, | Performed by: PHYSICAL MEDICINE & REHABILITATION

## 2022-04-29 PROCEDURE — 99499 UNLISTED E&M SERVICE: CPT | Mod: S$PBB,,, | Performed by: PHYSICAL MEDICINE & REHABILITATION

## 2022-04-29 PROCEDURE — 95911 NRV CNDJ TEST 9-10 STUDIES: CPT | Mod: 26,S$PBB,, | Performed by: PHYSICAL MEDICINE & REHABILITATION

## 2022-04-29 PROCEDURE — 95886 PR EMG COMPLETE, W/ NERVE CONDUCTION STUDIES, 5+ MUSCLES: ICD-10-PCS | Mod: 26,S$PBB,, | Performed by: PHYSICAL MEDICINE & REHABILITATION

## 2022-04-29 PROCEDURE — 95911 PR NERVE CONDUCTION STUDY; 9-10 STUDIES: ICD-10-PCS | Mod: 26,S$PBB,, | Performed by: PHYSICAL MEDICINE & REHABILITATION

## 2022-04-29 NOTE — PROGRESS NOTES
[facility]  Physical Medicine and Rehabilitation   [address] [place] [address], [address]  [place] [address]             Patient: Dustin Owens   Patient ID: [id number]   Sex:     Date of Birth:     Age:     Notes:     Last visit date: 4/29/2022         Visit date and time: 4/29/2022 12:13   Patient Age on Visit Date:     Referring Physician:     Diagnoses:         Hand numbness    Sensory NCS      Nerve / Sites Rec. Site Onset Lat Peak Lat Ref. NP Amp Ref. PP Amp Ref. Segments Distance Velocity     ms ms ms µV µV µV µV  cm m/s   R Median - Digit II (Antidromic)      Wrist Dig II 4.11 4.84 ?3.40 3.1 ?15.0 6.1 ?20.0 Wrist - Dig II 13 32   L Median - Digit II (Antidromic)      Wrist Dig II 3.75 4.58 ?3.40 3.5 ?15.0 8.7 ?20.0 Wrist - Dig II 13 35   R Ulnar - Digit V (Antidromic)      Wrist Dig V 2.19 3.07 ?3.10 15.7 ?10.0 24.7 ?15.0 Wrist - Dig V 11 50   L Ulnar - Digit V (Antidromic)      Wrist Dig V 2.50 3.23 ?3.10 9.8 ?10.0  ?15.0 Wrist - Dig V 11 44   L Radial - Anatomical snuff box (Forearm)      Forearm Wrist 1.93 2.81 ?2.90 17.9 ?15.0 18.6 ?15.0 Forearm - Wrist 10 52   R Radial - Anatomical snuff box (Forearm)      Forearm Wrist 1.67 2.50 ?2.90 13.4 ?15.0 20.6 ?15.0 Forearm - Wrist 10 60       Motor NCS      Nerve / Sites Muscle Latency Ref. Amplitude Ref. Amp % Duration Segments Distance Lat Diff Velocity Ref.     ms ms mV mV % ms  cm ms m/s m/s   R Median - APB      Wrist APB 4.11 ?4.40 6.5 ?4.0 100 10.42 Wrist - APB 7         Elbow APB 9.48  7.2  111 10.31 Elbow - Wrist 24 5.36 45 ?49   L Median - APB      Wrist APB 4.11 ?4.40 6.7 ?4.0 100 9.64 Wrist - APB 7         Elbow APB 9.79  6.6  99.4 10.16 Elbow - Wrist 25 5.68 44 ?49   R Ulnar - ADM      Wrist ADM 3.33 ?3.60 9.4 ?5.0 100 6.67 Wrist - ADM 7         B.Elbow ADM 8.18  8.3  88.7 7.03 B.Elbow - Wrist 25 4.84 52 ?49      A.Elbow ADM 10.00  9.2  98.2 7.19 A.Elbow - B.Elbow 10 1.82 55 ?49   L Ulnar - ADM      Wrist ADM 2.97 ?3.60 7.8 ?5.0 100  7.08 Wrist - ADM 7         B.Elbow ADM 7.71  7.7  98.1 7.86 B.Elbow - Wrist 24 4.74 51 ?49      A.Elbow ADM 10.36  6.1  78.7 8.07 A.Elbow - B.Elbow 10 2.66 38 ?49       EMG Summary Table     Spontaneous MUAP Recruitment   Muscle IA Fib PSW Fasc H.F. Amp Dur. PPP Pattern   R. Biceps brachii N None None None None N N N N   R. Deltoid N None None None None N N N N   R. Triceps brachii N None None None None N N N N   R. Extensor carpi radialis brevis N None None None None N N N N   R. First dorsal interosseous N None None None None N N N N       Summary    The motor conduction test was performed on 4 nerve(s). The results were normal in 1 nerve(s): R Ulnar - ADM. Results outside the specified normal range were found in 3 nerve(s), as follows:   In the R Median - APB study  o the take off velocity result was reduced for Elbow - Wrist segment   In the L Median - APB study  o the take off velocity result was reduced for Elbow - Wrist segment   In the L Ulnar - ADM study  o the take off velocity result was reduced for A.Elbow - B.Elbow segment    The sensory conduction test was performed on 6 nerve(s). The results were normal in 2 nerve(s): R Ulnar - Digit V (Antidromic), L Radial - Anatomical snuff box (Forearm). Results outside the specified normal range were found in 4 nerve(s), as follows:   In the R Median - Digit II (Antidromic) study  o the peak latency result was increased for Wrist stimulation  o the peak amplitude result was reduced for Wrist stimulation   In the L Median - Digit II (Antidromic) study  o the peak latency result was increased for Wrist stimulation  o the peak amplitude result was reduced for Wrist stimulation   In the L Ulnar - Digit V (Antidromic) study  o the peak latency result was increased for Wrist stimulation  o the peak amplitude result was reduced for Wrist stimulation   In the R Radial - Anatomical snuff box (Forearm) study  o the peak amplitude result was reduced for Forearm  stimulation    The needle EMG study was normal in all 5 tested muscles: R. Biceps brachii, R. Deltoid, R. Triceps brachii, R. Extensor carpi radialis brevis, R. First dorsal interosseous.          Conclusion: Incomplete study due to poor tolerance.    Moderate bilateral carpal tunnel syndrome  Mild left ulnar neuropathy at elbow  Abnormality of right radial sensory likely normal age variant vs. evidence of a sensory neuropathy  NO electrophysiologic evidence of a cervical radiculopathy on the right upper extremity.          NOTE- Pt. had a vasovagal reaction and the study was terminated early. However, the right upper extremity did not show any evidence of cervical radiculopathy. The left arm was not done.          ____________________________  Vira Pineda D.O.                                     Note- Pt. Was observed for approximately 30 mins. His sister came to pick him up. All vitals were stable.

## 2022-04-29 NOTE — TELEPHONE ENCOUNTER
----- Message from Silva Murray sent at 4/29/2022 10:01 AM CDT -----  Contact: pt  Type: Needs Medical Advice    Who: Called: pt     Best Call Back Number: [phone]    Inquiry/Question: Pt went to wrong building he is on his way maybe 5 minutes please advise  Thank you~

## 2022-05-02 ENCOUNTER — OFFICE VISIT (OUTPATIENT)
Dept: ORTHOPEDICS | Facility: CLINIC | Age: 67
End: 2022-05-02
Payer: OTHER GOVERNMENT

## 2022-05-02 VITALS — HEIGHT: 69 IN | WEIGHT: 180 LBS | BODY MASS INDEX: 26.66 KG/M2

## 2022-05-02 DIAGNOSIS — G62.9 NEUROPATHY: Primary | ICD-10-CM

## 2022-05-02 PROCEDURE — 99213 OFFICE O/P EST LOW 20 MIN: CPT | Mod: S$GLB,,, | Performed by: ORTHOPAEDIC SURGERY

## 2022-05-02 PROCEDURE — 99213 PR OFFICE/OUTPT VISIT, EST, LEVL III, 20-29 MIN: ICD-10-PCS | Mod: S$GLB,,, | Performed by: ORTHOPAEDIC SURGERY

## 2022-05-02 NOTE — PROGRESS NOTES
Subjective:       Patient ID: Dustin Owens is a 66 y.o. male.    Chief Complaint: Pain of the Neck (Cervical f/u, states his neck is okay but still having residual pain/numbness in fingers on right side. Thumb, index, and middle. Had EMG )      History of Present Illness    Prior to meeting with the patient I reviewed the medical chart in [facility]. This included reviewing the previous progress notes from our office, review of the patient's last appointment with their primary care provider, review of any visits to the emergency room, and review of any pain management appointments or procedures.   Patient is here follow-up for painful in disrupting right hand dysesthesia and paresthesia of his 1st 3 fingers.  He is here to review an updated EMG study.  No significant neck pain at this time.    Current Medications  Current Outpatient Medications   Medication Sig Dispense Refill    atorvastatin (LIPITOR) 10 MG tablet Take 10 mg by mouth once daily.      dexchlorphen-phenylephrine-DM 1-5-10 mg/5 mL Syrp Take 5 mLs by mouth every 4 (four) hours as needed (cough). 240 mL 0    EScitalopram oxalate (LEXAPRO) 20 MG tablet Take 1 tablet (20 mg total) by mouth once daily. 90 tablet 0    fluticasone propionate (FLONASE) 50 mcg/actuation nasal spray 1 spray (50 mcg total) by Each Nostril route once daily. 15.8 mL 2    gabapentin (NEURONTIN) 300 MG capsule Take 1 capsule (300 mg total) by mouth 3 (three) times daily. 90 capsule 1    cetirizine (ZYRTEC) 10 MG tablet Take 1 tablet (10 mg total) by mouth once daily. 30 tablet 0    HYDROcodone-acetaminophen (NORCO) 5-325 mg per tablet Take 1 tablet by mouth every 8 (eight) hours as needed for Pain. (Patient not taking: Reported on 5/2/2022) 9 tablet 0    methylPREDNISolone (MEDROL DOSEPACK) 4 mg tablet use as directed (Patient not taking: Reported on 5/2/2022) 1 each 0    sildenafiL (VIAGRA) 50 MG tablet Take 1 tablet (50 mg total) by mouth daily as needed for Erectile  Dysfunction. 30 tablet 2    traZODone (DESYREL) 50 MG tablet Take 1 tablet (50 mg total) by mouth every evening. 90 tablet 0     No current facility-administered medications for this visit.       Allergies  Review of patient's allergies indicates:   Allergen Reactions    Amoxicillin Diarrhea    Pollen, micronized Other (See Comments)       Past Medical History  Past Medical History:   Diagnosis Date    Asthma     Personal history of colonic polyps 07/11/2018       Surgical History  History reviewed. No pertinent surgical history.    Family History:   Family History   Problem Relation Age of Onset    Cancer Mother     Diabetes Father        Social History:   Social History     Socioeconomic History    Marital status:    Tobacco Use    Smoking status: Former Smoker    Smokeless tobacco: Never Used       Hospitalization/Major Diagnostic Procedure:     Review of Systems     General/Constitutional:  Chills denies. Fatigue denies. Fever denies. Weight gain denies. Weight loss denies.    Respiratory:  Shortness of breath denies.    Cardiovascular:  Chest pain denies.    Gastrointestinal:  Constipation denies. Diarrhea denies. Nausea denies. Vomiting denies.     Hematology:  Easy bruising denies. Prolonged bleeding denies.     Genitourinary:  Frequent urination denies. Pain in lower back denies. Painful urination denies.     Musculoskeletal:  See HPI for details    Skin:  Rash denies.    Neurologic:  Dizziness denies. Gait abnormalities denies. Seizures denies. Tingling/Numbess denies.    Psychiatric:  Anxiety denies. Depressed mood denies.     Objective:   Vital Signs: There were no vitals filed for this visit.     Physical Exam      General Examination:     Constitutional: The patient is alert and oriented to lace person and time. Mood is pleasant.     Head/Face: Normal facial features normal eyebrows    Eyes: Normal extraocular motion bilaterally    Lungs: Respirations are equal and unlabored    Gait is  coordinated.    Cardiovascular: There are no swelling or varicosities present.    Lymphatic: Negative for adenopathy    Skin: Normal    Neurological: Level of consciousness normal. Oriented to place person and time and situation    Psychiatric: Oriented to time place person and situation    Physical exam does not demonstrate typical carpal tunnel syndrome signs.    XRAY Report/ Interpretation:  Bilateral upper extremity EMG nerve conduction study was not completed but does demonstrate moderate bilateral carpal tunnel syndrome and mild left ulnar neuropathy at the elbow.  There is no signs of any type of right upper extremity radiculopathy from the cervical spine.  However there is an abnormality of the right radial sensory aspect.      Assessment:       1. Neuropathy        Plan:       Dustin was seen today for pain.    Diagnoses and all orders for this visit:    Neuropathy         No follow-ups on file.  This is to attest that the physician's assistant Chemo Lund served in the capacity as a scribe for this patient encounter.  This is also to verify that I have reviewed the patient's history and helped formulate the treatment plan and discussed it with the physician's assistant .  I have actively participated and evaluation and treatment plan for this patient visit.  The treatment plan and medical decision-making is outlined below:  Referral to Neurology and PM&R for further medical treatment and management.  No surgical options available.    Treatment options were discussed with regards to the nature of the medical condition. Conservative pain intervention and surgical options were discussed in detail. The probability of success of each separate treatment option was discussed. The patient expressed a clear understanding of the treatment options. With regards to surgery, the procedure risk, benefits, complications, and outcomes were discussed. No guarantees were given with regards to surgical outcome.   The  risk of complications, morbidity, and mortality of patient management decisions have been made at the time of this visit. These are associated with the patient's problems, diagnostic procedures and treatment options. This includes the possible management options selected and those considered but not selected by the patient after shared medical decision making we discussed with the patient.     This note was created using Dragon voice recognition software that occasionally misinterpreted phrases or words.

## 2022-05-03 ENCOUNTER — OFFICE VISIT (OUTPATIENT)
Dept: URGENT CARE | Facility: CLINIC | Age: 67
End: 2022-05-03
Payer: OTHER GOVERNMENT

## 2022-05-03 VITALS
HEART RATE: 82 BPM | RESPIRATION RATE: 18 BRPM | TEMPERATURE: 98 F | DIASTOLIC BLOOD PRESSURE: 72 MMHG | HEIGHT: 69 IN | SYSTOLIC BLOOD PRESSURE: 116 MMHG | OXYGEN SATURATION: 97 % | WEIGHT: 176.81 LBS | BODY MASS INDEX: 26.19 KG/M2

## 2022-05-03 DIAGNOSIS — Z72.51 HIGH RISK SEXUAL BEHAVIOR, UNSPECIFIED TYPE: ICD-10-CM

## 2022-05-03 DIAGNOSIS — Z11.3 SCREEN FOR STD (SEXUALLY TRANSMITTED DISEASE): Primary | ICD-10-CM

## 2022-05-03 PROCEDURE — 99213 OFFICE O/P EST LOW 20 MIN: CPT | Mod: S$GLB,,,

## 2022-05-03 PROCEDURE — 99213 PR OFFICE/OUTPT VISIT, EST, LEVL III, 20-29 MIN: ICD-10-PCS | Mod: S$GLB,,,

## 2022-05-03 NOTE — PATIENT INSTRUCTIONS
We will notify you with STD urine results in 5-7 days. Call clinic back if you do not hear from us within a week. Do not have any sexual activity until test results are resulted and you have completed treatment.    If you develop any symptoms while waiting for test results return to clinic for further evaluation.     Follow up with [facility] to get full STD panel including HIV, Hep C and other blood testing.                  [phone] or [phone]  [facility] [place] [address] [id number]

## 2022-05-03 NOTE — PROGRESS NOTES
"Subjective:       Patient ID: Dustin Owens is a 66 y.o. male.    Vitals:  height is 5' 9" (1.753 m) and weight is 80.2 kg (176 lb 12.8 oz). His temperature is 98.2 °F (36.8 °C). His blood pressure is 116/72 and his pulse is 82. His respiration is 18 and oxygen saturation is 97%.     Chief Complaint: Exposure to STD    Pt states he wants a STD screening because he thinks he may have had exposure to a STD. Pt states he isn't having any symptoms. Denies a known exposure. Reports he has had 3 sexual partners in the past year. Reports gonorrhea infection in the past almost 30 years ago but was treated for this.      Exposure to STD  The patient's pertinent negatives include no penile discharge, penile pain, scrotal swelling or testicular pain. Pertinent negatives include no abdominal pain, chest pain, chills, coughing, dysuria, fever, flank pain, shortness of breath or sore throat.       Constitution: Negative for activity change, appetite change, chills, sweating, fever and unexpected weight change.   HENT: Negative for ear pain, postnasal drip, sinus pain, sinus pressure and sore throat.    Cardiovascular: Negative for chest pain.   Eyes: Negative for blurred vision.   Respiratory: Negative for chest tightness, cough and shortness of breath.    Gastrointestinal: Negative for abdominal pain.   Genitourinary: Negative for dysuria, flank pain, penile discharge, penile pain, penile swelling, scrotal swelling and testicular pain.   Neurological: Negative for dizziness, history of vertigo and altered mental status.   Psychiatric/Behavioral: Negative for altered mental status.       Objective:      Physical Exam   Constitutional: He is oriented to person, place, and time.  Non-toxic appearance. He does not appear ill. No distress.   HENT:   Nose: Nose normal.   Eyes: Conjunctivae are normal.      extraocular movement intact   Cardiovascular: Normal rate, normal heart sounds and normal pulses.   Pulmonary/Chest: Effort " normal and breath sounds normal. No respiratory distress.   Abdominal: Soft.   Neurological: no focal deficit. He is alert and oriented to person, place, and time.   Skin: Skin is warm, dry and not diaphoretic. Capillary refill takes 2 to 3 seconds.   Psychiatric: Mood normal.         Assessment:       1. Screen for STD (sexually transmitted disease)    2. High risk sexual behavior, unspecified type          Plan:         Screen for STD (sexually transmitted disease)  -     CT/NG, T. vaginalis; Future; Expected date: 05/03/2022    High risk sexual behavior, unspecified type  -     CT/NG, T. vaginalis; Future; Expected date: 05/03/2022         Patient presents for STD screening, reports no known exposures but he is nervous he may have gotten exposed. Offered treatment for Obed and chlamydia today but patient want to wait for test results. Informed patient to go to [facility] for full STD panel.

## 2022-05-04 DIAGNOSIS — A74.9 CHLAMYDIA: Primary | ICD-10-CM

## 2022-05-04 LAB
C TRACH RRNA SPEC QL NAA+PROBE: POSITIVE
N GONORRHOEA RRNA SPEC QL NAA+PROBE: NEGATIVE
T VAGINALIS RRNA SPEC QL NAA+PROBE: NEGATIVE

## 2022-05-04 RX ORDER — AZITHROMYCIN 1 G/1
1 POWDER, FOR SUSPENSION ORAL ONCE
Qty: 1 PACKET | Refills: 0 | Status: SHIPPED | OUTPATIENT
Start: 2022-05-04 | End: 2022-05-04

## 2022-05-27 ENCOUNTER — OFFICE VISIT (OUTPATIENT)
Dept: FAMILY MEDICINE | Facility: CLINIC | Age: 67
End: 2022-05-27
Payer: OTHER GOVERNMENT

## 2022-05-27 VITALS
BODY MASS INDEX: 26.32 KG/M2 | TEMPERATURE: 98 F | DIASTOLIC BLOOD PRESSURE: 68 MMHG | HEART RATE: 77 BPM | OXYGEN SATURATION: 97 % | HEIGHT: 69 IN | WEIGHT: 177.69 LBS | SYSTOLIC BLOOD PRESSURE: 112 MMHG

## 2022-05-27 DIAGNOSIS — R19.7 DIARRHEA, UNSPECIFIED TYPE: Primary | ICD-10-CM

## 2022-05-27 PROCEDURE — 99214 PR OFFICE/OUTPT VISIT, EST, LEVL IV, 30-39 MIN: ICD-10-PCS | Mod: S$PBB,,, | Performed by: NURSE PRACTITIONER

## 2022-05-27 PROCEDURE — 99213 OFFICE O/P EST LOW 20 MIN: CPT | Mod: PBBFAC,PO | Performed by: NURSE PRACTITIONER

## 2022-05-27 PROCEDURE — 99214 OFFICE O/P EST MOD 30 MIN: CPT | Mod: S$PBB,,, | Performed by: NURSE PRACTITIONER

## 2022-05-27 PROCEDURE — 99999 PR PBB SHADOW E&M-EST. PATIENT-LVL III: CPT | Mod: PBBFAC,,, | Performed by: NURSE PRACTITIONER

## 2022-05-27 PROCEDURE — 99999 PR PBB SHADOW E&M-EST. PATIENT-LVL III: ICD-10-PCS | Mod: PBBFAC,,, | Performed by: NURSE PRACTITIONER

## 2022-05-27 NOTE — PROGRESS NOTES
This dictation has been generated using Modal Fluency Dictation some phonetic errors may occur. Please contact author for clarification if needed.     Problem List Items Addressed This Visit    None     Visit Diagnoses     Diarrhea, unspecified type    -  Primary    Relevant Orders    H. pylori antigen, stool    Pancreatic elastase, fecal    Fecal fat, qualitative    Occult blood x 1, stool    WBC, Stool    Rotavirus antigen, stool    Adenovirus Antigen EIA, Stool    Calprotectin, Stool    Giardia / Cryptosporidum, EIA    Stool Exam-Ova,Cysts,Parasites    Clostridium difficile EIA    Stool culture          Orders Placed This Encounter    Adenovirus Antigen EIA, Stool    Clostridium difficile EIA    Stool culture    H. pylori antigen, stool    Pancreatic elastase, fecal    Fecal fat, qualitative    Occult blood x 1, stool    WBC, Stool    Rotavirus antigen, stool    Calprotectin, Stool    Giardia / Cryptosporidum, EIA    Stool Exam-Ova,Cysts,Parasites         Diarrhea workup as above.  Rule out infectious etiology, pancreatic insufficiency, blood in stool, malabsorption.  I will review and address accordingly.  Further workup pending results above.      No follow-ups on file.    ________________________________________________________________  ________________________________________________________________      Chief Complaint   Patient presents with    Diarrhea     History of present illness  This 66 y.o. presents today for complaint of diarrhea.  Symptoms present since February 2022. Patient had been on antibiotics.  Stool studies were negative at that time for C diff.  Patient notes continued watery stools and sometimes slightly formed.  Denies any blood.  Patient denies melena.  No abdominal pain.  He tried over-the-counter therapy with some control however did have a bowel movement for a couple of days and was concerned about that.    Review of systems  No fever chills  Denies abdominal  pain  Denies weight loss    Past Medical History:   Diagnosis Date    Asthma     Personal history of colonic polyps 07/11/2018       History reviewed. No pertinent surgical history.    Family History   Problem Relation Age of Onset    Cancer Mother     Diabetes Father        Social History     Socioeconomic History    Marital status:    Tobacco Use    Smoking status: Former Smoker    Smokeless tobacco: Never Used       Current Outpatient Medications   Medication Sig Dispense Refill    atorvastatin (LIPITOR) 10 MG tablet Take 10 mg by mouth once daily.      EScitalopram oxalate (LEXAPRO) 20 MG tablet Take 1 tablet (20 mg total) by mouth once daily. 90 tablet 0    fluticasone propionate (FLONASE) 50 mcg/actuation nasal spray 1 spray (50 mcg total) by Each Nostril route once daily. 15.8 mL 2    gabapentin (NEURONTIN) 300 MG capsule Take 1 capsule (300 mg total) by mouth 3 (three) times daily. 90 capsule 1    traZODone (DESYREL) 50 MG tablet Take 1 tablet (50 mg total) by mouth every evening. 90 tablet 0    cetirizine (ZYRTEC) 10 MG tablet Take 1 tablet (10 mg total) by mouth once daily. 30 tablet 0    sildenafiL (VIAGRA) 50 MG tablet Take 1 tablet (50 mg total) by mouth daily as needed for Erectile Dysfunction. 30 tablet 2     No current facility-administered medications for this visit.       Review of patient's allergies indicates:   Allergen Reactions    Amoxicillin Diarrhea    Pollen, micronized Other (See Comments)       Physical examination  Vitals Reviewed\  Vitals:    05/27/22 1323   BP: 112/68   Pulse: 77   Temp: 98.3 °F (36.8 °C)     Body mass index is 26.24 kg/m². .FLOWAMB[14    Weight: 80.6 kg (177 lb 11.1 oz)    Gen. Well-dressed well-nourished   Skin warm dry and intact.  No rashes noted.  HEENT.  Masked    Neck is supple without adenopathy  Chest.  Respirations are even unlabored.  Lungs are clear to auscultation.  Cardiac regular rate and rhythm.  No chest wall adenopathy  noted.  Abdomen is soft and not distended.  Bowel sounds are present.  No tenderness during palpation of the abdomen.  No Hepatosplenomegaly noted.  No hernia noted.  No CVA tenderness to percussion.    Neuro. Awake alert oriented x4.  Normal judgment and cognition noted.  Extremities no clubbing cyanosis or edema noted.     Call or return to clinic prn if these symptoms worsen or fail to improve as anticipated.

## 2022-05-31 ENCOUNTER — OFFICE VISIT (OUTPATIENT)
Dept: PHYSICAL MEDICINE AND REHAB | Facility: CLINIC | Age: 67
End: 2022-05-31
Payer: OTHER GOVERNMENT

## 2022-05-31 ENCOUNTER — LAB VISIT (OUTPATIENT)
Dept: LAB | Facility: HOSPITAL | Age: 67
End: 2022-05-31
Attending: FAMILY MEDICINE
Payer: OTHER GOVERNMENT

## 2022-05-31 VITALS
WEIGHT: 177 LBS | BODY MASS INDEX: 26.22 KG/M2 | DIASTOLIC BLOOD PRESSURE: 77 MMHG | HEIGHT: 69 IN | HEART RATE: 74 BPM | SYSTOLIC BLOOD PRESSURE: 129 MMHG

## 2022-05-31 DIAGNOSIS — G56.01 CARPAL TUNNEL SYNDROME OF RIGHT WRIST: Primary | ICD-10-CM

## 2022-05-31 DIAGNOSIS — M54.2 CERVICALGIA: ICD-10-CM

## 2022-05-31 DIAGNOSIS — M79.10 MYALGIA: ICD-10-CM

## 2022-05-31 DIAGNOSIS — R19.7 DIARRHEA, UNSPECIFIED TYPE: ICD-10-CM

## 2022-05-31 PROCEDURE — 20553 PR INJECT TRIGGER POINTS, > 3: ICD-10-PCS | Mod: S$PBB,,, | Performed by: PHYSICAL MEDICINE & REHABILITATION

## 2022-05-31 PROCEDURE — 87427 SHIGA-LIKE TOXIN AG IA: CPT | Performed by: NURSE PRACTITIONER

## 2022-05-31 PROCEDURE — 89055 LEUKOCYTE ASSESSMENT FECAL: CPT | Performed by: NURSE PRACTITIONER

## 2022-05-31 PROCEDURE — 82272 OCCULT BLD FECES 1-3 TESTS: CPT | Performed by: NURSE PRACTITIONER

## 2022-05-31 PROCEDURE — 99214 PR OFFICE/OUTPT VISIT, EST, LEVL IV, 30-39 MIN: ICD-10-PCS | Mod: 25,S$PBB,, | Performed by: PHYSICAL MEDICINE & REHABILITATION

## 2022-05-31 PROCEDURE — 82705 FATS/LIPIDS FECES QUAL: CPT | Performed by: NURSE PRACTITIONER

## 2022-05-31 PROCEDURE — 87046 STOOL CULTR AEROBIC BACT EA: CPT | Performed by: NURSE PRACTITIONER

## 2022-05-31 PROCEDURE — 82656 EL-1 FECAL QUAL/SEMIQ: CPT | Performed by: NURSE PRACTITIONER

## 2022-05-31 PROCEDURE — 83993 ASSAY FOR CALPROTECTIN FECAL: CPT | Performed by: NURSE PRACTITIONER

## 2022-05-31 PROCEDURE — 20553 NJX 1/MLT TRIGGER POINTS 3/>: CPT | Mod: PBBFAC,PN | Performed by: PHYSICAL MEDICINE & REHABILITATION

## 2022-05-31 PROCEDURE — 87045 FECES CULTURE AEROBIC BACT: CPT | Performed by: NURSE PRACTITIONER

## 2022-05-31 PROCEDURE — 99999 PR PBB SHADOW E&M-EST. PATIENT-LVL III: CPT | Mod: PBBFAC,,, | Performed by: PHYSICAL MEDICINE & REHABILITATION

## 2022-05-31 PROCEDURE — 20553 NJX 1/MLT TRIGGER POINTS 3/>: CPT | Mod: S$PBB,,, | Performed by: PHYSICAL MEDICINE & REHABILITATION

## 2022-05-31 PROCEDURE — 99213 OFFICE O/P EST LOW 20 MIN: CPT | Mod: PBBFAC,PN | Performed by: PHYSICAL MEDICINE & REHABILITATION

## 2022-05-31 PROCEDURE — 99999 PR PBB SHADOW E&M-EST. PATIENT-LVL III: ICD-10-PCS | Mod: PBBFAC,,, | Performed by: PHYSICAL MEDICINE & REHABILITATION

## 2022-05-31 PROCEDURE — 87177 OVA AND PARASITES SMEARS: CPT | Performed by: NURSE PRACTITIONER

## 2022-05-31 PROCEDURE — 87338 HPYLORI STOOL AG IA: CPT | Performed by: NURSE PRACTITIONER

## 2022-05-31 PROCEDURE — 87329 GIARDIA AG IA: CPT | Performed by: NURSE PRACTITIONER

## 2022-05-31 PROCEDURE — 87425 ROTAVIRUS AG IA: CPT | Performed by: NURSE PRACTITIONER

## 2022-05-31 PROCEDURE — 87209 SMEAR COMPLEX STAIN: CPT | Performed by: NURSE PRACTITIONER

## 2022-05-31 PROCEDURE — 99214 OFFICE O/P EST MOD 30 MIN: CPT | Mod: 25,S$PBB,, | Performed by: PHYSICAL MEDICINE & REHABILITATION

## 2022-05-31 RX ORDER — DICLOFENAC SODIUM 10 MG/G
4 GEL TOPICAL 4 TIMES DAILY
Qty: 150 G | Refills: 6 | Status: SHIPPED | OUTPATIENT
Start: 2022-05-31 | End: 2023-06-08

## 2022-05-31 RX ORDER — GABAPENTIN 600 MG/1
600 TABLET ORAL 3 TIMES DAILY
Qty: 90 TABLET | Refills: 11 | Status: SHIPPED | OUTPATIENT
Start: 2022-05-31 | End: 2022-07-07

## 2022-05-31 RX ORDER — LIDOCAINE HYDROCHLORIDE 10 MG/ML
3 INJECTION INFILTRATION; PERINEURAL ONCE
Status: SHIPPED | OUTPATIENT
Start: 2022-05-31

## 2022-05-31 NOTE — PROGRESS NOTES
HPI:  Patient is a 66 y.o. year old male previously seen for an EMG/NCS. Results are below. He is being referred back by orthopedics, as they do not think his symptoms are due to carpal tunnel syndrome but rather a peripheral neuropathy.  He was prescribed neurontin 300mg TID which has been helping. He was sent to therapy for his neck, which has not helped. His worse burning pain is his right index finger. He denies weakness or trauma.  He has had neck pain for several years. His latest MRI of the cervical spine results are below:    EMG findings:  Moderate bilateral carpal tunnel syndrome  Mild left ulnar neuropathy at elbow  Abnormality of right radial sensory likely normal age variant vs. evidence of a sensory neuropathy  NO electrophysiologic evidence of a cervical radiculopathy     Labs  EGFR cr lfts gluc nl    Imaging  MRI CERVICAL SPINE WITHOUT CONTRAST     CLINICAL HISTORY:  numbness and tingling in right hand;.  Cervical disc disorder with radiculopathy, unspecified cervical region.     TECHNIQUE:  Multiplanar, multisequence MR images of the cervical spine were acquired without the administration of contrast.     COMPARISON:  Plain films of the cervical spine dated 08/27/2019     FINDINGS:  Vertebral column: The cervical vertebral bodies maintain normal height.  Alignment is normal.  There is mild multilevel endplate osteophyte formation.  There is mild disc space narrowing from the C4-5 through the C6-7 levels.  The odontoid process is intact.  The discs are mildly desiccated.     Spinal canal, cord, epidural space: The spinal canal is developmentally normal.  The cord is normal in signal intensity.  Cord caliber is normal.  There is no abnormal epidural mass or fluid collection.     Findings by level:     C2-3: There is only minimal bulging of the annulus.  There is no spinal canal or significant foraminal stenosis.     C3-4: There is disc space narrowing, left greater than right facet joint arthropathy  and uncovertebral spurring with a minimal disc osteophyte complex.  There is minimal narrowing of the ventral subarachnoid space.  There is borderline spinal stenosis.  There is mild left foraminal stenosis.     C4-5: There is mild disc space narrowing.  There is left greater than right facet joint arthropathy but right greater than left uncovertebral spurring.  There is a broad disc protrusion osteophyte complex eccentric to the right which narrows the subarachnoid space.  There is mild spinal stenosis without acute cord compression.  There is mild-to-moderate left foraminal stenosis.     C5-6: There is bilateral uncovertebral spurring and left greater than right facet joint arthropathy.  There is a broad disc osteophyte complex which narrows the subarachnoid space.  There is borderline to mild spinal stenosis with mild-to-moderate bilateral foraminal stenosis.     C6-7: There is a mild disc bulge, left greater than right facet joint arthropathy and mild bilateral uncovertebral spurring.  Again, there is borderline line spinal stenosis without cord compression.  There is mild bilateral foraminal stenosis.     C7-T1: There is right greater than left facet joint arthropathy.  There is however no spinal canal or significant foraminal stenosis.     Soft tissues, other: The prevertebral soft tissues are normal.     Impression:     1.  There is multilevel degenerative disc and facet disease.  There is some degree of disc space narrowing, disc osteophyte complex, uncovertebral spurring and/or facet joint arthropathy at multiple levels.  There is no significant spinal stenosis and there is no acute cord compression.  There is, however, multilevel foraminal stenosis.  The findings are discussed in detail by level above.   XR HAND COMPLETE 3 VIEW RIGHT     CLINICAL HISTORY:  hand pain;     TECHNIQUE:  PA, lateral, and oblique views of the right hand were performed.     COMPARISON:  None     FINDINGS:  A fracture of the bones  of the right wrist or hand is not seen.  Soft tissue swelling is not identified.  Juxta-articular bone erosion or Osteoporosis is not seen.  A small soft tissue calcification or accessory ossicle is seen along the ulnar side of the PIP joint of the middle finger.  Abnormalities at the tips of the fingers is not seen.  Subluxation at the wrist is not noted.     Impression:     Soft tissue calcification or small accessory ossicle is seen adjacent to the PIP joint of the middle finger otherwise negative radiographs of the right hand.           Past Medical History:   Diagnosis Date    Asthma     Personal history of colonic polyps 07/11/2018     History reviewed. No pertinent surgical history.  Family History   Problem Relation Age of Onset    Cancer Mother     Diabetes Father      Social History     Socioeconomic History    Marital status:    Tobacco Use    Smoking status: Former Smoker    Smokeless tobacco: Never Used       Review of patient's allergies indicates:   Allergen Reactions    Amoxicillin Diarrhea    Pollen, micronized Other (See Comments)       Current Outpatient Medications:     atorvastatin (LIPITOR) 10 MG tablet, Take 10 mg by mouth once daily., Disp: , Rfl:     EScitalopram oxalate (LEXAPRO) 20 MG tablet, Take 1 tablet (20 mg total) by mouth once daily., Disp: 90 tablet, Rfl: 0    fluticasone propionate (FLONASE) 50 mcg/actuation nasal spray, 1 spray (50 mcg total) by Each Nostril route once daily., Disp: 15.8 mL, Rfl: 2    cetirizine (ZYRTEC) 10 MG tablet, Take 1 tablet (10 mg total) by mouth once daily., Disp: 30 tablet, Rfl: 0    gabapentin (NEURONTIN) 300 MG capsule, Take 1 capsule (300 mg total) by mouth 3 (three) times daily., Disp: 90 capsule, Rfl: 1    sildenafiL (VIAGRA) 50 MG tablet, Take 1 tablet (50 mg total) by mouth daily as needed for Erectile Dysfunction., Disp: 30 tablet, Rfl: 2    traZODone (DESYREL) 50 MG tablet, Take 1 tablet (50 mg total) by mouth every  "evening., Disp: 90 tablet, Rfl: 0      Review of Systems  No nausea, vomiting, fevers, chills , contipation, diarrhea or sweats,no weight change, occ neck stiffness, no chest pain, no sob, no change of bowel or bladder habits,no coordination issues      Physical Exam:      Vitals:    05/31/22 0956   BP: 129/77   Pulse: 74     Alert and oriented ×4 follows commands answers all questions appropriately,affect wnl  Manual muscle test 5 out of 5 sensation to light touch grossly intact  + tenderness cervcial paraspinals  Nl gait  -spurling's  No focal musc atrophy  -billy's  Full wrist ROM  No C/C/E  No hypothenar or thenar eminence atrophy  -median compression right  -tinnels at wrist right  -tinnels at elbow  -spurlings  Nl gait      Assessment:  Cervical spondylosis w. Recurrent muscular spasm  Moderate bilateral carpal tunnel syndrome  Mild left ulnar neuropathy at elbow      Plan:  Inc neurontin to 600mg TID to help w. Neuralgia  I also discussed performing a therapeutic/diagnostic cortisone injection to his right carpal tunnel, but pt. Declined.  He is aggreable to wearing a carpal tunnel splint nightly for his right wrist.   He is having neck spasms today, will do TPI's to his cervical paraspinals  Voltaren gel prescription issued to be used as adjunct  If his hand pain continues, he will consider getting a carpal tunnel injection next encounter        PROCEDURE NOTE:Risk and benefit of trigger point injections given to pt. Injections performed w. A" 25G needle after sterile prep w. chlorohexedine, verbal consent obtained . NO complications. B/l trapezius, splenius cap and lev scapulae were injected with a total of 3ML of 1% Lidocaine              "

## 2022-06-01 LAB
CRYPTOSP AG STL QL IA: NEGATIVE
G LAMBLIA AG STL QL IA: NEGATIVE
OB PNL STL: NEGATIVE
RV AG STL QL IA.RAPID: NEGATIVE
WBC #/AREA STL HPF: NORMAL /[HPF]

## 2022-06-02 ENCOUNTER — TELEPHONE (OUTPATIENT)
Dept: FAMILY MEDICINE | Facility: CLINIC | Age: 67
End: 2022-06-02
Payer: OTHER GOVERNMENT

## 2022-06-02 LAB
E COLI SXT1 STL QL IA: NEGATIVE
E COLI SXT2 STL QL IA: NEGATIVE

## 2022-06-02 NOTE — TELEPHONE ENCOUNTER
----- Message from Nhi Hollins sent at 6/2/2022 10:19 AM CDT -----  Contact: pt at [phone]  Type:  Patient Returning Call    Who Called:  pt  Who Left Message for Patient:  pt unsure  Does the patient know what this is regarding?:  no  Best Call Back Number:  [phone]  Additional Information:  pt is calling the office returning a call. He states he's unsure who called but he did have some test a few days ago. Please call back and advise.

## 2022-06-02 NOTE — TELEPHONE ENCOUNTER
Spoke with pt via phone. Advised that we had recently received paperwork from [facility] for a physical but that Dr. Starkey would not be able to fill this out due to not being with [facility] any longer. Scheduled pt to est care with MD Howe on 6/29/2022.

## 2022-06-03 LAB
ELASTASE 1, FECAL: >500 MCG/G
FAT STL QL: NORMAL
NEUTRAL FAT STL QL: NORMAL
O+P STL MICRO: NORMAL

## 2022-06-04 LAB — BACTERIA STL CULT: NORMAL

## 2022-06-07 LAB — CALPROTECTIN STL-MCNT: <27.1 MCG/G

## 2022-06-08 LAB
H PYLORI AG STL QL IA: NOT DETECTED
SPECIMEN SOURCE: NORMAL

## 2022-06-15 ENCOUNTER — OFFICE VISIT (OUTPATIENT)
Dept: PHYSICAL MEDICINE AND REHAB | Facility: CLINIC | Age: 67
End: 2022-06-15
Payer: OTHER GOVERNMENT

## 2022-06-15 VITALS
DIASTOLIC BLOOD PRESSURE: 79 MMHG | HEART RATE: 78 BPM | WEIGHT: 177 LBS | SYSTOLIC BLOOD PRESSURE: 146 MMHG | HEIGHT: 69 IN | BODY MASS INDEX: 26.22 KG/M2

## 2022-06-15 DIAGNOSIS — G56.00 CARPAL TUNNEL SYNDROME, UNSPECIFIED LATERALITY: Primary | ICD-10-CM

## 2022-06-15 DIAGNOSIS — M79.642 PAIN IN BOTH HANDS: ICD-10-CM

## 2022-06-15 DIAGNOSIS — M79.641 PAIN IN BOTH HANDS: ICD-10-CM

## 2022-06-15 PROCEDURE — 99213 OFFICE O/P EST LOW 20 MIN: CPT | Mod: PBBFAC,PN | Performed by: PHYSICAL MEDICINE & REHABILITATION

## 2022-06-15 PROCEDURE — 99999 PR PBB SHADOW E&M-EST. PATIENT-LVL III: CPT | Mod: PBBFAC,,, | Performed by: PHYSICAL MEDICINE & REHABILITATION

## 2022-06-15 PROCEDURE — 99213 PR OFFICE/OUTPT VISIT, EST, LEVL III, 20-29 MIN: ICD-10-PCS | Mod: S$PBB,,, | Performed by: PHYSICAL MEDICINE & REHABILITATION

## 2022-06-15 PROCEDURE — 99213 OFFICE O/P EST LOW 20 MIN: CPT | Mod: S$PBB,,, | Performed by: PHYSICAL MEDICINE & REHABILITATION

## 2022-06-15 PROCEDURE — 99999 PR PBB SHADOW E&M-EST. PATIENT-LVL III: ICD-10-PCS | Mod: PBBFAC,,, | Performed by: PHYSICAL MEDICINE & REHABILITATION

## 2022-06-15 RX ORDER — DICLOFENAC SODIUM AND MISOPROSTOL 50; 200 MG/1; UG/1
1 TABLET, DELAYED RELEASE ORAL 3 TIMES DAILY
Qty: 90 TABLET | Refills: 11 | Status: SHIPPED | OUTPATIENT
Start: 2022-06-15 | End: 2022-06-29

## 2022-06-15 RX ORDER — GABAPENTIN 800 MG/1
800 TABLET ORAL 3 TIMES DAILY
Qty: 90 TABLET | Refills: 11 | Status: SHIPPED | OUTPATIENT
Start: 2022-06-15 | End: 2023-06-08

## 2022-06-15 NOTE — PROGRESS NOTES
HPI:  Patient is a 66 y.o. year old male w. Hand pain. His EMG did indicate mod CTS. However, orthopedic surgeon does not think this is the cause. He does have some atypical symptomology. He states he is very sensitive to touch and to temperature changes. Last eval. He was having neck pain. I performed tpi's which helped.  He has been taking neurontin 600mg tid, which helps but it does not last all day.  Imaging  XR HAND COMPLETE 3 VIEW RIGHT     CLINICAL HISTORY:  hand pain;     TECHNIQUE:  PA, lateral, and oblique views of the right hand were performed.     COMPARISON:  None     FINDINGS:  A fracture of the bones of the right wrist or hand is not seen.  Soft tissue swelling is not identified.  Juxta-articular bone erosion or Osteoporosis is not seen.  A small soft tissue calcification or accessory ossicle is seen along the ulnar side of the PIP joint of the middle finger.  Abnormalities at the tips of the fingers is not seen.  Subluxation at the wrist is not noted.     Impression:     Soft tissue calcification or small accessory ossicle is seen adjacent to the PIP joint of the middle finger otherwise negative radiographs of the right hand.   MRI CERVICAL SPINE WITHOUT CONTRAST     CLINICAL HISTORY:  numbness and tingling in right hand;.  Cervical disc disorder with radiculopathy, unspecified cervical region.     TECHNIQUE:  Multiplanar, multisequence MR images of the cervical spine were acquired without the administration of contrast.     COMPARISON:  Plain films of the cervical spine dated 08/27/2019     FINDINGS:  Vertebral column: The cervical vertebral bodies maintain normal height.  Alignment is normal.  There is mild multilevel endplate osteophyte formation.  There is mild disc space narrowing from the C4-5 through the C6-7 levels.  The odontoid process is intact.  The discs are mildly desiccated.     Spinal canal, cord, epidural space: The spinal canal is developmentally normal.  The cord is normal in  signal intensity.  Cord caliber is normal.  There is no abnormal epidural mass or fluid collection.     Findings by level:     C2-3: There is only minimal bulging of the annulus.  There is no spinal canal or significant foraminal stenosis.     C3-4: There is disc space narrowing, left greater than right facet joint arthropathy and uncovertebral spurring with a minimal disc osteophyte complex.  There is minimal narrowing of the ventral subarachnoid space.  There is borderline spinal stenosis.  There is mild left foraminal stenosis.     C4-5: There is mild disc space narrowing.  There is left greater than right facet joint arthropathy but right greater than left uncovertebral spurring.  There is a broad disc protrusion osteophyte complex eccentric to the right which narrows the subarachnoid space.  There is mild spinal stenosis without acute cord compression.  There is mild-to-moderate left foraminal stenosis.     C5-6: There is bilateral uncovertebral spurring and left greater than right facet joint arthropathy.  There is a broad disc osteophyte complex which narrows the subarachnoid space.  There is borderline to mild spinal stenosis with mild-to-moderate bilateral foraminal stenosis.     C6-7: There is a mild disc bulge, left greater than right facet joint arthropathy and mild bilateral uncovertebral spurring.  Again, there is borderline line spinal stenosis without cord compression.  There is mild bilateral foraminal stenosis.     C7-T1: There is right greater than left facet joint arthropathy.  There is however no spinal canal or significant foraminal stenosis.     Soft tissues, other: The prevertebral soft tissues are normal.     Impression:     1.  There is multilevel degenerative disc and facet disease.  There is some degree of disc space narrowing, disc osteophyte complex, uncovertebral spurring and/or facet joint arthropathy at multiple levels.  There is no significant spinal stenosis and there is no acute  cord compression.  There is, however, multilevel foraminal stenosis.  The findings are discussed in detail by level above.     Labs  No recent labs      Past Medical History:   Diagnosis Date    Asthma     Personal history of colonic polyps 07/11/2018     No past surgical history on file.  Family History   Problem Relation Age of Onset    Cancer Mother     Diabetes Father      Social History     Socioeconomic History    Marital status:    Tobacco Use    Smoking status: Former Smoker    Smokeless tobacco: Never Used       Review of patient's allergies indicates:   Allergen Reactions    Amoxicillin Diarrhea    Pollen, micronized Other (See Comments)       Current Outpatient Medications:     atorvastatin (LIPITOR) 10 MG tablet, Take 10 mg by mouth once daily., Disp: , Rfl:     diclofenac sodium (VOLTAREN) 1 % Gel, Apply 4 g topically 4 (four) times daily., Disp: 150 g, Rfl: 6    EScitalopram oxalate (LEXAPRO) 20 MG tablet, Take 1 tablet (20 mg total) by mouth once daily., Disp: 90 tablet, Rfl: 0    fluticasone propionate (FLONASE) 50 mcg/actuation nasal spray, 1 spray (50 mcg total) by Each Nostril route once daily., Disp: 15.8 mL, Rfl: 2    gabapentin (NEURONTIN) 600 MG tablet, Take 1 tablet (600 mg total) by mouth 3 (three) times daily., Disp: 90 tablet, Rfl: 11    cetirizine (ZYRTEC) 10 MG tablet, Take 1 tablet (10 mg total) by mouth once daily., Disp: 30 tablet, Rfl: 0    diclofenac-misoprostol  mg-mcg (ARTHROTEC 50)  mg-mcg per tablet, Take 1 tablet by mouth 3 (three) times daily., Disp: 90 tablet, Rfl: 11    gabapentin (NEURONTIN) 800 MG tablet, Take 1 tablet (800 mg total) by mouth 3 (three) times daily., Disp: 90 tablet, Rfl: 11    sildenafiL (VIAGRA) 50 MG tablet, Take 1 tablet (50 mg total) by mouth daily as needed for Erectile Dysfunction., Disp: 30 tablet, Rfl: 2    traZODone (DESYREL) 50 MG tablet, Take 1 tablet (50 mg total) by mouth every evening., Disp: 90 tablet,  Rfl: 0    Current Facility-Administered Medications:     LIDOcaine HCL 10 mg/ml (1%) injection 3 mL, 3 mL, Other, Once, Vira Bautista, DO    Review of Systems  No nausea, vomiting, fevers, Chills , contipation, diarrhea or sweats    Physical Exam:      Vitals:    06/15/22 1035   BP: (!) 146/79   Pulse: 78     Alert and oriented ×4 follows commands answers all questions appropriately,affect wnl  Manual muscle test 5 out of 5 sensation to light touch grossly intact  + tenderness cervcial paraspinals  Nl gait  -spurling's  No focal musc atrophy  -billy's  Full wrist ROM  No C/C/E  No hypothenar or thenar eminence atrophy  -median compression right  -tinnels at wrist right  -tinnels at elbow  -spurlings  Nl gait        Assessment:  Mulifactorial hand pain- neuralgia +/-raynauds  Moderate bilateral carpal tunnel syndrome  Mild left ulnar neuropathy at elbow        Plan:  Inc neurontin to 800mg TID to help w. Neuralgia  Arthrotec prescription trial  issued   Refer to hand surgeon for second opinion regarding carpal tunnel release  Will do a serologic rheumatologic work up  rtc 4 wks

## 2022-06-17 ENCOUNTER — LAB VISIT (OUTPATIENT)
Dept: LAB | Facility: HOSPITAL | Age: 67
End: 2022-06-17
Attending: PHYSICAL MEDICINE & REHABILITATION
Payer: OTHER GOVERNMENT

## 2022-06-17 DIAGNOSIS — M79.641 PAIN IN BOTH HANDS: ICD-10-CM

## 2022-06-17 DIAGNOSIS — M79.642 PAIN IN BOTH HANDS: ICD-10-CM

## 2022-06-17 LAB
ALBUMIN SERPL BCP-MCNC: 3.8 G/DL (ref 3.5–5.2)
ALP SERPL-CCNC: 76 U/L (ref 55–135)
ALT SERPL W/O P-5'-P-CCNC: 24 U/L (ref 10–44)
ANION GAP SERPL CALC-SCNC: 10 MMOL/L (ref 8–16)
AST SERPL-CCNC: 25 U/L (ref 10–40)
BILIRUB SERPL-MCNC: 0.6 MG/DL (ref 0.1–1)
BUN SERPL-MCNC: 16 MG/DL (ref 8–23)
CALCIUM SERPL-MCNC: 9.2 MG/DL (ref 8.7–10.5)
CHLORIDE SERPL-SCNC: 107 MMOL/L (ref 95–110)
CO2 SERPL-SCNC: 23 MMOL/L (ref 23–29)
CREAT SERPL-MCNC: 1.3 MG/DL (ref 0.5–1.4)
CRP SERPL-MCNC: 0.7 MG/L (ref 0–8.2)
EST. GFR  (AFRICAN AMERICAN): >60 ML/MIN/1.73 M^2
EST. GFR  (NON AFRICAN AMERICAN): 57.7 ML/MIN/1.73 M^2
GLUCOSE SERPL-MCNC: 93 MG/DL (ref 70–110)
POTASSIUM SERPL-SCNC: 4.4 MMOL/L (ref 3.5–5.1)
PROT SERPL-MCNC: 6.9 G/DL (ref 6–8.4)
SODIUM SERPL-SCNC: 140 MMOL/L (ref 136–145)

## 2022-06-17 PROCEDURE — 86140 C-REACTIVE PROTEIN: CPT | Performed by: PHYSICAL MEDICINE & REHABILITATION

## 2022-06-17 PROCEDURE — 85651 RBC SED RATE NONAUTOMATED: CPT | Mod: PO | Performed by: PHYSICAL MEDICINE & REHABILITATION

## 2022-06-17 PROCEDURE — 86200 CCP ANTIBODY: CPT | Performed by: PHYSICAL MEDICINE & REHABILITATION

## 2022-06-17 PROCEDURE — 86038 ANTINUCLEAR ANTIBODIES: CPT | Performed by: PHYSICAL MEDICINE & REHABILITATION

## 2022-06-17 PROCEDURE — 80053 COMPREHEN METABOLIC PANEL: CPT | Performed by: PHYSICAL MEDICINE & REHABILITATION

## 2022-06-17 PROCEDURE — 86431 RHEUMATOID FACTOR QUANT: CPT | Performed by: PHYSICAL MEDICINE & REHABILITATION

## 2022-06-17 PROCEDURE — 81374 HLA I TYPING 1 ANTIGEN LR: CPT | Mod: PO | Performed by: PHYSICAL MEDICINE & REHABILITATION

## 2022-06-17 PROCEDURE — 36415 COLL VENOUS BLD VENIPUNCTURE: CPT | Mod: PO | Performed by: PHYSICAL MEDICINE & REHABILITATION

## 2022-06-18 LAB
CCP AB SER IA-ACNC: <0.5 U/ML
ERYTHROCYTE [SEDIMENTATION RATE] IN BLOOD BY WESTERGREN METHOD: 15 MM/HR (ref 0–23)

## 2022-06-20 LAB
ANA SER QL IF: NORMAL
RHEUMATOID FACT SERPL-ACNC: <13 IU/ML (ref 0–15)

## 2022-06-28 ENCOUNTER — OFFICE VISIT (OUTPATIENT)
Dept: ORTHOPEDICS | Facility: CLINIC | Age: 67
End: 2022-06-28
Payer: OTHER GOVERNMENT

## 2022-06-28 VITALS — HEIGHT: 69 IN | BODY MASS INDEX: 25.31 KG/M2 | WEIGHT: 170.88 LBS

## 2022-06-28 DIAGNOSIS — G56.03 BILATERAL CARPAL TUNNEL SYNDROME: ICD-10-CM

## 2022-06-28 PROCEDURE — 99999 PR PBB SHADOW E&M-EST. PATIENT-LVL III: ICD-10-PCS | Mod: PBBFAC,,, | Performed by: FAMILY MEDICINE

## 2022-06-28 PROCEDURE — 76942 ECHO GUIDE FOR BIOPSY: CPT | Mod: PBBFAC,PN | Performed by: FAMILY MEDICINE

## 2022-06-28 PROCEDURE — 99214 PR OFFICE/OUTPT VISIT, EST, LEVL IV, 30-39 MIN: ICD-10-PCS | Mod: 25,S$PBB,, | Performed by: FAMILY MEDICINE

## 2022-06-28 PROCEDURE — 99999 PR PBB SHADOW E&M-EST. PATIENT-LVL III: CPT | Mod: PBBFAC,,, | Performed by: FAMILY MEDICINE

## 2022-06-28 PROCEDURE — 20526 THER INJECTION CARP TUNNEL: CPT | Mod: S$PBB,RT,, | Performed by: FAMILY MEDICINE

## 2022-06-28 PROCEDURE — 99213 OFFICE O/P EST LOW 20 MIN: CPT | Mod: PBBFAC,PN | Performed by: FAMILY MEDICINE

## 2022-06-28 PROCEDURE — 76942 CARPAL TUNNEL: ICD-10-PCS | Mod: 26,S$PBB,, | Performed by: FAMILY MEDICINE

## 2022-06-28 PROCEDURE — 20526 CARPAL TUNNEL: ICD-10-PCS | Mod: S$PBB,RT,, | Performed by: FAMILY MEDICINE

## 2022-06-28 PROCEDURE — 99214 OFFICE O/P EST MOD 30 MIN: CPT | Mod: 25,S$PBB,, | Performed by: FAMILY MEDICINE

## 2022-06-28 RX ORDER — METHYLPREDNISOLONE ACETATE 80 MG/ML
40 INJECTION, SUSPENSION INTRA-ARTICULAR; INTRALESIONAL; INTRAMUSCULAR; SOFT TISSUE
Status: DISCONTINUED | OUTPATIENT
Start: 2022-06-28 | End: 2022-06-28 | Stop reason: HOSPADM

## 2022-06-28 RX ADMIN — METHYLPREDNISOLONE ACETATE 40 MG: 80 INJECTION, SUSPENSION INTRA-ARTICULAR; INTRALESIONAL; INTRAMUSCULAR; SOFT TISSUE at 02:06

## 2022-06-28 NOTE — PROGRESS NOTES
Subjective:      Patient ID: Dustin Owens is a 66 y.o. male.    Chief Complaint: Pain of the Right Hand and Pain of the Left Hand    Patient here today with complaints of bilateral hand pain worse on the right secondary to carpal tunnel syndrome. Had EMG performed by Dr. Pineda. Was incomplete, but did reveal moderate carpal tunnel syndrome. So far his only treatment has been gabapentin which he states helps him sleep.  He feels a constant tingling in his right hand primarily involving the 1st three digits.  He feels tingling in a similar fashion in the left hand but only when using it for extended periods.       Review of Systems   Constitutional: Negative for chills and decreased appetite.   HENT: Negative for congestion and sore throat.    Eyes: Negative for blurred vision.   Cardiovascular: Negative for chest pain, dyspnea on exertion and palpitations.   Respiratory: Negative for cough and shortness of breath.    Skin: Negative for rash.   Neurological: Negative for difficulty with concentration, disturbances in coordination and headaches.   Psychiatric/Behavioral: Negative for altered mental status, depression, hallucinations, memory loss and suicidal ideas.         Objective:              General    Nursing note and vitals reviewed.  Constitutional: He is oriented to person, place, and time. He appears well-developed and well-nourished.   HENT:   Nose: Nose normal.   Eyes: EOM are normal. Pupils are equal, round, and reactive to light.   Neck: Neck supple.   Cardiovascular: Normal rate.    Pulmonary/Chest: Effort normal.   Abdominal: Soft.   Neurological: He is alert and oriented to person, place, and time. He has normal reflexes.   Psychiatric: He has a normal mood and affect. His behavior is normal. Judgment and thought content normal.             Right Hand/Wrist Exam     Pain   Wrist - The patient exhibits pain of the flexor/pronator group.    Tenderness   The patient is tender to palpation of the  jose area.    Tests   Phalens sign: positive  Tinel's sign (median nerve): positive  Finkelstein's test: negative    Atrophy   Thenar:  positive    Other     Neuorologic Exam    Median Distribution: abnormal  Ulnar Distribution: normal  Radial Distribution: normal      Left Hand/Wrist Exam     Pain   Wrist - The patient exhibits pain of the flexor/pronator group.    Tenderness   The patient is tender to palpation of the jose area.     Tests   Phalens sign: positive  Tinel's sign (median nerve): positive  Finkelstein's test: negative    Atrophy  Thenar:  Negative    Other     Sensory Exam  Median Distribution: abnormal  Ulnar Distribution: normal  Radial Distribution: normal          Muscle Strength   Right Upper Extremity   Wrist extension: 5/5   Wrist flexion: 5/5   : 4/5   Left Upper Extremity  Wrist extension: 5/5   Wrist flexion: 5/5   :  5/5     Vascular Exam       Capillary Refill  Right Hand: normal capillary refill  Left Hand: normal capillary refill          X-ray images from June fourteenth reviewed in clinic today by me.  There are no remarkable findings on this x-ray.  Well-preserved joint spacing and normal bony abnormality.          Assessment:       Encounter Diagnosis   Name Primary?    Bilateral carpal tunnel syndrome           Plan:       Dustin was seen today for pain and pain.    Diagnoses and all orders for this visit:    Bilateral carpal tunnel syndrome  -     Ambulatory referral/consult to Hand Surgery    we discussed futures treatment options today. Carpal tunnel release surgery is an option should he desire. He elected for US guided injection to R carpal tunnel today. If not successful will refer him to hand surgery for CT release.     Carpal Tunnel    Date/Time: 6/28/2022 2:00 PM  Performed by: Alexys Starkey MD  Authorized by: Alexys Starkey MD     Consent Done?:  Yes (Verbal)  Indications:  Pain  Site marked: the procedure site was marked    Timeout: prior to procedure the  correct patient, procedure, and site was verified    Prep: patient was prepped and draped in usual sterile fashion      Local anesthesia used?: Yes    Local anesthetic:  Topical anesthetic  Location:  Wrist  Site:  R carpal tunnel  Ultrasonic Guidance for Needle Placement?: Yes    Needle size:  25 G  Approach:  Volar  Medications:  40 mg methylPREDNISolone acetate 80 mg/mL  Patient tolerance:  Patient tolerated the procedure well with no immediate complications     Additional Comments: Images of needle placement saved to machine

## 2022-06-29 ENCOUNTER — OFFICE VISIT (OUTPATIENT)
Dept: FAMILY MEDICINE | Facility: CLINIC | Age: 67
End: 2022-06-29
Payer: OTHER GOVERNMENT

## 2022-06-29 ENCOUNTER — LAB VISIT (OUTPATIENT)
Dept: LAB | Facility: HOSPITAL | Age: 67
End: 2022-06-29
Attending: STUDENT IN AN ORGANIZED HEALTH CARE EDUCATION/TRAINING PROGRAM
Payer: OTHER GOVERNMENT

## 2022-06-29 VITALS
TEMPERATURE: 99 F | HEART RATE: 75 BPM | DIASTOLIC BLOOD PRESSURE: 80 MMHG | OXYGEN SATURATION: 97 % | SYSTOLIC BLOOD PRESSURE: 116 MMHG | WEIGHT: 167.56 LBS | RESPIRATION RATE: 16 BRPM | BODY MASS INDEX: 24.82 KG/M2 | HEIGHT: 69 IN

## 2022-06-29 DIAGNOSIS — Z12.5 ENCOUNTER FOR PROSTATE CANCER SCREENING: ICD-10-CM

## 2022-06-29 DIAGNOSIS — Z00.00 ENCOUNTER FOR PREVENTIVE HEALTH EXAMINATION: ICD-10-CM

## 2022-06-29 DIAGNOSIS — G62.9 NEUROPATHY: ICD-10-CM

## 2022-06-29 DIAGNOSIS — F33.40 RECURRENT MAJOR DEPRESSIVE DISORDER, IN REMISSION: ICD-10-CM

## 2022-06-29 DIAGNOSIS — E78.1 PURE HYPERTRIGLYCERIDEMIA: ICD-10-CM

## 2022-06-29 DIAGNOSIS — M50.10 CERVICAL DISC DISORDER WITH RADICULOPATHY: ICD-10-CM

## 2022-06-29 DIAGNOSIS — Z00.00 ENCOUNTER FOR PREVENTIVE HEALTH EXAMINATION: Primary | ICD-10-CM

## 2022-06-29 DIAGNOSIS — R79.9 ABNORMAL FINDING OF BLOOD CHEMISTRY, UNSPECIFIED: ICD-10-CM

## 2022-06-29 DIAGNOSIS — G47.09 OTHER INSOMNIA: ICD-10-CM

## 2022-06-29 LAB
BASOPHILS # BLD AUTO: 0.03 K/UL (ref 0–0.2)
BASOPHILS NFR BLD: 0.5 % (ref 0–1.9)
CHOLEST SERPL-MCNC: 208 MG/DL (ref 120–199)
CHOLEST/HDLC SERPL: 4.1 {RATIO} (ref 2–5)
COMPLEXED PSA SERPL-MCNC: 0.38 NG/ML (ref 0–4)
DIFFERENTIAL METHOD: ABNORMAL
EOSINOPHIL # BLD AUTO: 0.2 K/UL (ref 0–0.5)
EOSINOPHIL NFR BLD: 2.8 % (ref 0–8)
ERYTHROCYTE [DISTWIDTH] IN BLOOD BY AUTOMATED COUNT: 12.7 % (ref 11.5–14.5)
FOLATE SERPL-MCNC: 13.2 NG/ML (ref 4–24)
HCT VFR BLD AUTO: 46.5 % (ref 40–54)
HDLC SERPL-MCNC: 51 MG/DL (ref 40–75)
HDLC SERPL: 24.5 % (ref 20–50)
HGB BLD-MCNC: 14.4 G/DL (ref 14–18)
IMM GRANULOCYTES # BLD AUTO: 0.01 K/UL (ref 0–0.04)
IMM GRANULOCYTES NFR BLD AUTO: 0.2 % (ref 0–0.5)
LDLC SERPL CALC-MCNC: 146.4 MG/DL (ref 63–159)
LYMPHOCYTES # BLD AUTO: 1.6 K/UL (ref 1–4.8)
LYMPHOCYTES NFR BLD: 25.2 % (ref 18–48)
MCH RBC QN AUTO: 27.4 PG (ref 27–31)
MCHC RBC AUTO-ENTMCNC: 31 G/DL (ref 32–36)
MCV RBC AUTO: 89 FL (ref 82–98)
MONOCYTES # BLD AUTO: 0.5 K/UL (ref 0.3–1)
MONOCYTES NFR BLD: 8.1 % (ref 4–15)
NEUTROPHILS # BLD AUTO: 3.9 K/UL (ref 1.8–7.7)
NEUTROPHILS NFR BLD: 63.2 % (ref 38–73)
NONHDLC SERPL-MCNC: 157 MG/DL
NRBC BLD-RTO: 0 /100 WBC
PLATELET # BLD AUTO: 157 K/UL (ref 150–450)
PMV BLD AUTO: 11.1 FL (ref 9.2–12.9)
RBC # BLD AUTO: 5.25 M/UL (ref 4.6–6.2)
TRIGL SERPL-MCNC: 53 MG/DL (ref 30–150)
WBC # BLD AUTO: 6.15 K/UL (ref 3.9–12.7)

## 2022-06-29 PROCEDURE — 99396 PR PREVENTIVE VISIT,EST,40-64: ICD-10-PCS | Mod: S$PBB,,, | Performed by: STUDENT IN AN ORGANIZED HEALTH CARE EDUCATION/TRAINING PROGRAM

## 2022-06-29 PROCEDURE — 84153 ASSAY OF PSA TOTAL: CPT | Performed by: STUDENT IN AN ORGANIZED HEALTH CARE EDUCATION/TRAINING PROGRAM

## 2022-06-29 PROCEDURE — 82607 VITAMIN B-12: CPT | Performed by: STUDENT IN AN ORGANIZED HEALTH CARE EDUCATION/TRAINING PROGRAM

## 2022-06-29 PROCEDURE — 99215 OFFICE O/P EST HI 40 MIN: CPT | Mod: PBBFAC,PO | Performed by: STUDENT IN AN ORGANIZED HEALTH CARE EDUCATION/TRAINING PROGRAM

## 2022-06-29 PROCEDURE — 99396 PREV VISIT EST AGE 40-64: CPT | Mod: S$PBB,,, | Performed by: STUDENT IN AN ORGANIZED HEALTH CARE EDUCATION/TRAINING PROGRAM

## 2022-06-29 PROCEDURE — 82746 ASSAY OF FOLIC ACID SERUM: CPT | Performed by: STUDENT IN AN ORGANIZED HEALTH CARE EDUCATION/TRAINING PROGRAM

## 2022-06-29 PROCEDURE — 99999 PR PBB SHADOW E&M-EST. PATIENT-LVL V: CPT | Mod: PBBFAC,,, | Performed by: STUDENT IN AN ORGANIZED HEALTH CARE EDUCATION/TRAINING PROGRAM

## 2022-06-29 PROCEDURE — 99999 PR PBB SHADOW E&M-EST. PATIENT-LVL V: ICD-10-PCS | Mod: PBBFAC,,, | Performed by: STUDENT IN AN ORGANIZED HEALTH CARE EDUCATION/TRAINING PROGRAM

## 2022-06-29 PROCEDURE — 85025 COMPLETE CBC W/AUTO DIFF WBC: CPT | Performed by: STUDENT IN AN ORGANIZED HEALTH CARE EDUCATION/TRAINING PROGRAM

## 2022-06-29 PROCEDURE — 80061 LIPID PANEL: CPT | Performed by: STUDENT IN AN ORGANIZED HEALTH CARE EDUCATION/TRAINING PROGRAM

## 2022-06-29 PROCEDURE — 84207 ASSAY OF VITAMIN B-6: CPT | Performed by: STUDENT IN AN ORGANIZED HEALTH CARE EDUCATION/TRAINING PROGRAM

## 2022-06-29 PROCEDURE — 82300 ASSAY OF CADMIUM: CPT | Performed by: STUDENT IN AN ORGANIZED HEALTH CARE EDUCATION/TRAINING PROGRAM

## 2022-06-29 RX ORDER — CELECOXIB 200 MG/1
200 CAPSULE ORAL 2 TIMES DAILY PRN
Qty: 60 CAPSULE | Refills: 2 | Status: SHIPPED | OUTPATIENT
Start: 2022-06-29 | End: 2022-07-29

## 2022-06-29 NOTE — PROGRESS NOTES
[facility] Note    Subjective:    The HPI and pertinent ROS is included in the Diagnostic Impression Remarks section at the end of the note. Please see below for further details. Chief complaint is at end of note.     Medication List with Changes/Refills   New Medications    CELECOXIB (CELEBREX) 200 MG CAPSULE    Take 1 capsule (200 mg total) by mouth 2 (two) times daily as needed for Pain.   Current Medications    ATORVASTATIN (LIPITOR) 10 MG TABLET    Take 10 mg by mouth once daily.    CETIRIZINE (ZYRTEC) 10 MG TABLET    Take 1 tablet (10 mg total) by mouth once daily.    DICLOFENAC SODIUM (VOLTAREN) 1 % GEL    Apply 4 g topically 4 (four) times daily.    ESCITALOPRAM OXALATE (LEXAPRO) 20 MG TABLET    Take 1 tablet (20 mg total) by mouth once daily.    FLUTICASONE PROPIONATE (FLONASE) 50 MCG/ACTUATION NASAL SPRAY    1 spray (50 mcg total) by Each Nostril route once daily.    GABAPENTIN (NEURONTIN) 600 MG TABLET    Take 1 tablet (600 mg total) by mouth 3 (three) times daily.    GABAPENTIN (NEURONTIN) 800 MG TABLET    Take 1 tablet (800 mg total) by mouth 3 (three) times daily.    SILDENAFIL (VIAGRA) 50 MG TABLET    Take 1 tablet (50 mg total) by mouth daily as needed for Erectile Dysfunction.    TRAZODONE (DESYREL) 50 MG TABLET    Take 1 tablet (50 mg total) by mouth every evening.   Discontinued Medications    DICLOFENAC-MISOPROSTOL  MG-MCG (ARTHROTEC 50)  MG-MCG PER TABLET    Take 1 tablet by mouth 3 (three) times daily.     Modified Medications    No medications on file       Data reviewed 274}  Previous medical records reviewed and summarized in plan section at end of note.      If you are due for any health screening(s) below please notify me so we can arrange them to be ordered and scheduled to maintain your health.     Health Maintenance Due   Topic Date Due    Shingles Vaccine (1 of 2) Never done    COVID-19 Vaccine (2 - Booster for Sosa series) 05/06/2021     "PROSTATE-SPECIFIC ANTIGEN  05/14/2022       The following portions of the patient's history were reviewed and updated as appropriate: allergies, current medications, past family history, past medical history, past social history, past surgical history and problem list.    He  has a past medical history of Asthma and Personal history of colonic polyps (07/11/2018).  He  has no past surgical history on file.    He  reports that he has quit smoking. He has never used smokeless tobacco.  He family history includes Cancer in his mother; Diabetes in his father.    Review of patient's allergies indicates:   Allergen Reactions    Amoxicillin Diarrhea    Pollen, micronized Other (See Comments)       Tobacco Use: Medium Risk    Smoking Tobacco Use: Former Smoker    Smokeless Tobacco Use: Never Used     Physical Examination  General appearance: alert, cooperative, no distress  Neck: no thyromegaly, no neck stiffness  Lungs: clear to auscultation, no wheezes, rales or rhonchi, symmetric air entry  Heart: normal rate, regular rhythm, normal S1, S2, no murmurs, rubs, clicks or gallops  Abdomen: soft, nontender, nondistended, no rigidity, rebound, or guarding.   Back: no point tenderness over spine  Extremities: peripheral pulses normal, no unilateral leg swelling or calf tenderness   Neurological:alert, oriented, normal speech, no new focal findings or movement disorder noted from baseline    BP Readings from Last 3 Encounters:   06/29/22 116/80   06/15/22 (!) 146/79   05/31/22 129/77     Wt Readings from Last 3 Encounters:   06/29/22 76 kg (167 lb 8.8 oz)   06/28/22 77.5 kg (170 lb 13.7 oz)   06/15/22 80.3 kg (177 lb)     /80 (BP Location: Left arm, Patient Position: Sitting, BP Method: Large (Manual))   Pulse 75   Temp 98.5 °F (36.9 °C) (Oral)   Resp 16   Ht 5' 9" (1.753 m)   Wt 76 kg (167 lb 8.8 oz)   SpO2 97%   BMI 24.74 kg/m²    274}  Laboratory: I have reviewed old labs below:    274}    Lab Results " "  Component Value Date    WBC 5.13 08/12/2008    HGB 14.2 08/12/2008    HCT 45.1 08/12/2008    MCV 86.9 08/12/2008     08/12/2008     06/17/2022    K 4.4 06/17/2022     06/17/2022    CALCIUM 9.2 06/17/2022    CO2 23 06/17/2022    GLU 93 06/17/2022    BUN 16 06/17/2022    CREATININE 1.3 06/17/2022    ANIONGAP 10 06/17/2022    ESTGFRAFRICA >60.0 06/17/2022    EGFRNONAA 57.7 (A) 06/17/2022    PROT 6.9 06/17/2022    ALBUMIN 3.8 06/17/2022    BILITOT 0.6 06/17/2022    ALKPHOS 76 06/17/2022    ALT 24 06/17/2022    AST 25 06/17/2022    CHOL 165 08/12/2008    TRIG 153 (H) 08/12/2008    HDL 37 (L) 08/12/2008    LDLCALC 97.4 08/12/2008    TSH 1.92 08/12/2008    PSA 0.42 08/12/2008     Lab reviewed by me: Particular labs of significance that I will monitor, workup, or treat to improve are mentioned below in diagnostic impression remarks.    Imaging/EKG: I have reviewed the pertinent results and my findings are noted in remarks.  274}    CC:   Chief Complaint   Patient presents with    Establish Care    Arm Pain        274}    Assessment/Plan  Dustin Owens is a 66 y.o. male who presents to clinic with:  1. Encounter for preventive health examination    2. Neuropathy    3. Pure hypertriglyceridemia    4. Recurrent major depressive disorder, in remission    5. Other insomnia    6. Cervical disc disorder with radiculopathy    7. Encounter for prostate cancer screening    8. Abnormal finding of blood chemistry, unspecified       274}  Diagnostic Impression Remarks + HPI     Documentation entered by me for this encounter may have been done in part using speech-recognition technology. Although I have made an effort to ensure accuracy, "sound like" errors may exist and should be interpreted in context.      Preventive-will get PSA after shared decision maker will also get some additional blood work   Neuropathy-needs improvement had suspected neuropathy from carpal tunnel but seems more painful than typical " carpal tunnel an injection not help much will have Neurology assess to rule out some other conditions as well can continue gabapentin take NSAIDs can consider nortriptyline will check B12 folate some heavy metals   Insomnia-stable continue trazodone p.r.n.   Hyperlipidemia-control uncertain repeat lipid levels recommend healthy diet   Depression-stable has been Lexapro long time no SI or plan wants to wean off will cut in half and monitor   Cervical radiculopathy-needs improvement has chronic pain degenerative disc disease can continue gabapentin but this makes him sleepy consider switch to Lyrica will hold for now and try different NSAID and consider injections    This is the extent of the patient's concerns at this present time. He did not feel chest pain upon exertion, dyspnea, nausea, vomiting, diaphoresis, or syncope. No pleuritic chest pain, unilateral leg swelling, calf tenderness, or calf pain. Edward will return to clinic in a few months for further workup and reassessment or sooner as needed. He was instructed to call the clinic or go to the emergency department if his symptoms do not improve, worsens, or if new symptoms develop. As we discussed that symptoms could worsen over the next 24 hours he was advised that if any increased swelling, pain, or numbness arise to go immediately to the ED. Patient knows to call any time if an emergency arises. Shared decision making occurred and he verbalized understanding in agreement with this plan. I discussed imaging findings, diagnosis, possibilities, treatment options, medications, risks, and benefits. He had many questions regarding the options and long-term effects. All questions were answered. He expressed understanding after counseling regarding the diagnosis and recommendations. He was capable and demonstrated competence with understanding of these options. Shared decision making was performed resulting in him choosing the current treatment plan. Patient  handout was given with instructions and recommendations. Advised the patient that if they become pregnant to alert us immediately to assess for medication changes. I also discussed the importance of close follow up to discuss labs, change or modify his medications if needed, monitor side effects, and further evaluation of medical problems.     Additional workup planned: see labs ordered below.    See below for labs and meds ordered with associated diagnosis      1. Encounter for preventive health examination  - CBC Auto Differential; Future    2. Neuropathy  - Ambulatory referral/consult to Neurology; Future  - VITAMIN B6; Future  - HEAVY METALS SCREEN, BLOOD (QUANTITATIVE); Future    3. Pure hypertriglyceridemia  - Lipid Panel; Future    4. Recurrent major depressive disorder, in remission    5. Other insomnia    6. Cervical disc disorder with radiculopathy  - celecoxib (CELEBREX) 200 MG capsule; Take 1 capsule (200 mg total) by mouth 2 (two) times daily as needed for Pain.  Dispense: 60 capsule; Refill: 2    7. Encounter for prostate cancer screening  - PSA, Screening; Future    8. Abnormal finding of blood chemistry, unspecified  - Vitamin B12 Deficiency Panel; Future  - VITAMIN B6; Future  - Folate; Future  - HEAVY METALS SCREEN, BLOOD (QUANTITATIVE); Future      Jimbo Howe MD   274}  06/29/2022

## 2022-06-29 NOTE — PATIENT INSTRUCTIONS
Natanael Chan, Please read below to learn more on healthy choices, screenings, and useful information related to your health.  Most of my patients read it. Most of my healthy patients complete their cancer screenings. Don't lose out on improving your health.     Table of Contents:     Overdue health maintenance   Cancer prevention  Explanation on the different components of your blood work and interpretation  Frequently asked questions   Blood pressure log     If you are due for any health screening(s) below please notify me so we can arrange them to be ordered and scheduled to maintain your health. Most healthy patients at your age complete them.   Health Maintenance Due   Topic    Shingles Vaccine (1 of 2)    COVID-19 Vaccine (2 - Booster for Sosa series)    PROSTATE-SPECIFIC ANTIGEN                                 Cancer Prevention    Why should you choose to get screened for cancer? One simple reason is because you are important. You matter and deserve to have the best health so you can fulfill your great potential.       Colon Cancer screening - Most colon cancers can be prevented by screening. In most cases a polyp in the colon can grow and is not cancerous at first, but it can become cancerous years later. A polyp is like a seed that can grow into a weed if it is left in the soil. If the seed is detected and removed, no weed sprouts. A FIT test/Cologuard test and colonoscopy can detect precancerous polyps and lead to the prevention of cancer. A polyp is just like a seed that can be removed before the roots take hold. A colonoscopy can remove these polyps and eliminate the chance that these polyps turn into cancer.     Cervical Cancer screening- A PAP smear can detect precancerous cells that can become cervical cancer and can lead to procedures to remove them. It is very important to get a PAP smear as investing these few minutes can help prevent a lot of trouble down the road. If you want to do the most you  "can do to spend more time with your family and friends', cancer screenings can help with that goal.     Breast Cancer screening- Mammograms can detect breast cancer before it has spread and early detection allows the ability to remove the lesion prior to any spread. It is similar to a small rotten spot on fruit. If the spoiled part is removed quickly it can preserve the rest of the fruit, but if it is left alone it will corrupt the whole peach.     Smoking Cessation- "Smoking is like a chimney. The tar builds up and causes a back draft which leads to a cough. Smoking is like sitting in a car with a blocked exhaust system." Smoking can increase your risk for lung, mouth, throat, nose, esophagus, bladder, kidney, ureter, pancreas, stomach, liver, cervix, ovary, bowel, and leukemia [2]. If you have smoked in the past, you might meet the criteria for a CT lung cancer screening.     Lung Cancer Screening - "The U.S. Preventive Services Task Force (USPSTF) recommendsexternal icon yearly lung cancer screening with LDCT for people who--have a 20 pack-year or more smoking history, and smoke now or have quit within the past 15 years, and are between 50 and 80 years old. A pack-year is smoking an average of one pack of cigarettes per day for one year. For example, a person could have a 20 pack-year history by smoking one pack a day for 20 years or two packs a day for 10 years." [3]    Hypertension - Common high blood pressure meds may lower colorectal cancer risk-MURALI, July 6, 2020 - Hypertension Journal Report Medications commonly prescribed to treat high blood pressure may also reduce patients' colorectal cancer risk, according to new research published today in Hypertension, an American Heart Association journal." [4].     Low HbA1c - "Higher HbA1c levels (within both the non?diabetic and diabetic ranges) were associated with the risk of colorectal cancer (Model 2; P linear?=?0.009), especially for colon cancer." " [5].    A healthy diet, exercise, limitation of alcohol use, certain infections, avoidance of radiation/environmental toxins, and staying lean lowers your cancer risk [6].     I want to empower you to make informed choices for your health. I have a listed below the most common blood components that we check for when you get blood work. I discuss what each component measures along with common reasons for why they can be abnormal. If you are interested in understanding your blood work better, please read the lab explanation attached below.     Explanation of Lab Results    Please note: This information is included as a reference to help you better understand your lab results and is not be used for diagnosis.     Lipid Panel:  Cholesterol is a measure of cardiac risk and stroke risk. A lipid panel measures total cholesterol and provides readings which are broken down into 3 subsections: Triglycerides, HDL and LDL.    Total Cholesterol: Your total blood cholesterol is a measure of LDL cholesterol, HDL cholesterol, and other lipid components.  If your individual lipid level components are in the normal range and you have an elevated total cholesterol level we are generally not to concerned since we primarily look at the LDL cholesterol which is considered the bad cholesterol which can lead to heart attacks and strokes.  Your calculated cardiac risk is the most important factor in deciding if you would benefit from a cholesterol-lowering medication that the total cholesterol level.    Triglycerides are most diet and weight sensitive. They are affected easily by lifestyle changes. Ideally, this reading should be less than 150, although if the remainder of the cholesterol panel is within normal limits, we will tolerate upwards of 250-300. For the most part, if triglycerides are the only part of your cholesterol panel reading as 'abnormal', it is best to lose weight and modify your diet, including less saturated fat and less  simple sugars. We only start medication to lower this is the level is greater than 500 since this can increase ones risk for pancreatitis. This is not the cholesterol type that leads to heart attacks.     HDL is your 'good cholesterol.' Ideally, the reading should be over 40 and is particularly helpful when it is greater than 60. Research indicates that high HDL is extremely protective; and if your HDL level is greater than 60, we tolerate far worse LDL or triglyceride levels. For the most part, HDL is responsive to aerobic activity and ideal weight. We do not have medicines that increase the HDL reading very easily.    LDL is your 'bad cholesterol.' Our goals depend on how many cardiac risk factors you have.     High LDL: If you are diabetic or have had a heart attack, we like the LDL level to be less than 100. If you have 2 cardiac risk factors (such as diabetes, hypertension, family history, a low HDL and smoking), we like your LDL to be less than 130. If you have 0-2 cardiac risk factors, we like your LDL level to be less than 160, but we may not necessarily initiate medications to 190 unless you cannot lower it. The latest guidelines recommend to consider taking a statin only if your ASCVD cardiac risk score is at least greater than 7.5% and a strong recommendation if your risk score is greater than 10%.     Low LDL: Normal or low LDL is considered good and having an LDL below the normal range is not of a concern.     Complete Blood Count (CBC):    White blood cells: These are infection fighting cells. Mild fluctuations may represent minor illness at the time of blood draw. Marked fluctuations can represent immune diseases. This can also be elevated from smoking.     High WBC: Elevation in wbc can commonly be caused by an infection, steroid use, or any cause of inflammation such as many rheumatologic diseases, surgery, or trauma.      Low WBC: Many different things can cause this such as infections,  medications, rheumatologic disorders, malignancies, nutritional deficiencies, and a normal variant in some individuals. If this occurs it is common practice to rule out a few viruses such as HIV, Hep C, B12, folate along with a a peripheral blood smear to look for abnormalities. If you are otherwise healthy with no concerning symptoms and the workup is negative we usually monitor it with yearly blood work.  If there are other abnormal cell line abnormalities sometimes we refer to hematology to further evaluate.    Hemoglobin: A key indicator for anemia. Ranges depend on age. If you are significantly out of this range, we may need to talk with you.    High Hemoglobin: This can be elevated in some blood disorders, sleep apnea, chronic lung disease, kidney disease, testosterone use, dehydration, smoking, along with some other rare causes.     Low Hemoglobin: Many things can lead to this but the most common cause is an iron deficiency in females who lose blood during their periods, decreased absorption due to antacids, poor diet, sickle cell, anemia of chronic disease, malignancy, bleeding from the gut, along with some other less common causes. If you are a young female who has periods it is usually assumed that this is from that blood loss unless other symptoms or abnormal blood work is present. If you are above 45 and have an iron deficiency it is always recommended to get a colonoscopy to ensure that there is no lesion causing blood loss and to exclude malignancy.     Hematocrit: Another way of looking at anemia, much like your hemoglobin. If you are significantly out of this range, we may need to talk with you.    MCV: This looks at the size of your red blood cells.     High MCV:  A large number may indicate a B-12 deficiency, folate deficiency, alcohol use, liver disease, medication-induced phenomenon, or a need for further workup.    Low MCV: A small number may imply iron anemia or genetic disorder such as  alpha-thalassemia. We may check iron studies called to see if you are low.    MCH: Much like MCV above.    Platelets: These are clotting factors. We tolerate a broad range in this. If your numbers are less than 100, we may need to work it up.     High Platelet Count: If your numbers are elevated, this could represent ongoing inflammation within the body which can be caused by any infection, illness, iron deficiency, or other malignancy. We usually recheck it to monitor for improvement and if it does not resolve will work it up with more blood work.     Low Platelet Count: Many things can cause this such as infections, alcohol use, medications, liver disease, vitamin deficiencies, aspleenia, and some other rare blood disorders. If it persists many times we refer to hematology if a cause is not identified.     Iron:  This is not a reliable blood marker since this fluctuates throughout the day and if you are fasting many times your iron level in your blood is low but this does not necessarily mean you have a deficiency.  There are more reliable ways to measure your iron stores and these are discussed below.    Transferrin:  Many things can cause an abnormal result and this is not as important as some other labs mentioned below.    TIBC:  This is the total iron-binding capacity and this measures the total amount of iron that can be bound by proteins in the blood.  If you have an elevated TIBC this is a good marker that you could be low on iron and this is useful blood marker.  A simple way to think of this is seats in a car. The TIBC is the number of open seats that iron can sit in. If you have a high TIBC (number of open seats) that means you have a low iron level.     Ferritin:  A low ferritin is almost always caused from an iron deficiency.  A normal ferritin level does not need necessarily exclude an iron deficiency since many inflammatory conditions such as kidney disease, diabetes, arthritis, and obesity can raise  this level hiding an iron deficiency.  If you are healthy with no inflammatory conditions this is a very reliable test for detecting an iron deficiency since nothing else lowers your ferritin level except a low iron level. Keep in mind that you can have an iron deficiency if your ferritin level is normal but your TIBC is elevated.  If you have a low iron level the next step is always to identify why you have a low iron level.    CMP (Comprehensive Metabolic Panel):  Broadly, this is a test of organ function including the kidneys, liver, and electrolyte levels.    Glucose: This is primarily looking for diabetes. We like your blood glucose level to be less than 100 when fasting. Readings of 100-125 indicate what we call 'pre-diabetes' or 'glucose intolerance.' This does not necessarily indicate diabetes, but we may check another test called a hemoglobin A1C for confirmation. This level puts you at great risk for becoming a true diabetic and we would encourage the reduction of simple sugars and processed white flour as well as appropriate weight loss. If this number is greater than 125, it is likely you are diabetic; we will get an additional hemoglobin A1C test and will likely schedule you for an appointment. If you notice that your blood glucose is above 125 and we have not scheduled a follow-up appointment, please call us. Patients who are known diabetics can have readings greater than 125.    Urea Nitrogen: This is a kidney function and maybe elevated because of mild dehydration or because of excessive muscle breakdown from aggressive exercise habits.    Creatinine: This also is a kidney function test. It may be mildly elevated if you have particularly large muscle bulk or taking a supplement like creatine. It is related to the GFR. It is a muscle product that we track to look at your kidney function. If this is elevated and new, we may need to talk with you. If you have had this mildly elevated in the past, it is  likely that we will just track it to ensure that it does not worsen quickly. Some medications may gently worsen this, namely blood pressure pills called ace inhibitors. To some degree, we will permit levels of up to 1.6.    Sodium: This is essentially the concentration of salt within the body. This may be mildly low because of dehydration, overhydration, diuretics, .    Potassium: This is an electrolyte that can cause muscular cramping or cardiac difficulties. It is sometimes lowered by diuretic medications.    Chloride: For the most part, this is only relevant if the other electrolytes are abnormal.    CO2: This is a function of acid balance within the body. For the most part, mild abnormalities are not important and may represent a starvation or dehydration state when blood was drawn. Many medications can change this level as well such as diuretics, acetazolamide, calcium carbonate, laxatives, aspirin, and many others.    High CO2: Many things can cause this which includes dehydration, sleep apnea, and COPD.     Low CO2: Many things can lead to a low level and if you are generally healthy we are usually  not concerned. Diarrhea, renal disease, diabetes, and many medications can cause this.     Anion Gap: Only relevant if your CO2 is abnormal.    Calcium: This is not related to dietary intake of calcium. It may fluctuate gently based on the amount of protein within your body. If it is above 10.9, we may need to do additional testing. Many times if low the albumin level is low and once the corrected calcium level is calculated the calcium is in a normal range.     Total protein: This looks at protein within the body. Markedly elevated levels can represent an immune response and may require further workup.    Albumin: This may be elevated because of particularly high level of fitness. If it is markedly suppressed, it may represent organ dysfunction, particularly in the liver or kidneys.    AST and ALT: These are  enzymes in the liver and if elevated can indicate liver damage.     Elevated AST/ALT: Many things can caused elevated liver enzymes and the most common reason is viral infections, alcohol use, medications, supplements, autoimmune, along with some other rare causes. One of the most common reasons for a mild elevation in liver enzymes (less than 3 times the upper limit) is fatty liver disease. Many individuals who have extra fat in their diet store this in the liver and this can build up and cause a mild elevation. This is usually diagnosed with a liver ultrasound and exclusion of other causes. The only treatment for this is diet and exercise along with avoidance of liver toxic medications and alcohol. It is standard of care to rule our viral hepatitis and get imaging of the liver if elevated. This is monitored and if I feel concerned will refer to hepatology.     Alk Phos: Part of liver function or bones    High Alk Phos: elevated levels may indicate a liver injury or obstruction of bile flow. Elevated levels can also be seen in vitamin D deficiency, drug induced, or bone disorders.     Low Alk Phos: In most cases having a low Alkaline Phosphatase enzyme activity is not due to any disease and is simply a normal variant.  Having an elevated Alk Phos is more concerning and associated with many diseases and thus I would not be overly concerned with a low level which is seen in many health individuals. The reasons for a low serum alkaline phosphatase activity were reviewed in a 1-yr retrospective study and in this study it was found that no cause was found in most cases.  Low activity values were recorded in several individuals in the absence of any obvious cause. This would suggest that the definition of the lower limit of the reference range for alkaline phosphatase is arbitrary, thus limiting the use of low serum activity as a marker of disease.  In some cases micronutrients like Zinc (Zn) and Magnesium (Mg) are  causes of low ALP activity and you can take zinc or magnesium supplements. Unfortunately, the blood work to measure zinc and magnesium levels are unreliable and not very accurate since it does not test for the intracellular zinc or magnesium levels.     Angeline MARCOS, Mona VITAL, Citlali CORONADO. Clinical significance of a low serum alkaline phosphatase. Net J Med. 1992 Feb;40(1-2):9-14. PMID: [id number].  Huber CAGE S, Mervin B, Dayanna I, Behera S, Huber S. Low Alkaline Phosphatase (ALP) In Adult Population an Indicator of Zinc (Zn) and Magnesium (Mg) Deficiency. Curr Res Nutr Food Sci 2017;5(3). doi : http://dx.[email or web address]    Total Bilirubin: This is also part of liver function.    High T Bili: If you have right upper quadrant pain an elevation in total bilirubin can be caused by a gallbladder stone that is blocking the biliary tract that leads to your gastrointestinal tract. If you have fever and right upper quadrant pain this can sometimes be elevated when your gallbladder is infected and most individuals have nausea with vomiting associated with it. If you have no symptoms and are otherwise healthy this can be caused by Gilbert syndrome which is a benign normal variant. There are other causes such as some anemias but there would be abnormal blood counts.     Low T Bili: Nothing to be concerned about.     Thyroid Stimulating Hormone (TSH): This reading is an indicator of your thyroid function. The thyroid regulates energy levels throughout the entire body, affecting almost every organ system. This is an inverse relationship so a high number actually presents a low thyroid. If this is abnormal, we will often check an additional lab called a Free T4 to evaluate this more carefully. Borderline elevations, those of 5-10, can be watched or worked up further. Please do not take the supplement Biotin for at least a week prior to getting your TSH checked since this can lead to false measurement levels.  "    Prostate Specific Antigen (PSA): (Men only.) This is a prostate cancer screening test, and is no longer a routine screening test. Levels are truly a function of age. Being less than 4 is typical for someone more in their 60s. If you are young, it should probably be less than 3. A higher PSA result does not necessarily mean that you have cancer, but may indicate a need for a discussion with your provider. Options include observation to look at rate of rise or prostate biopsy. Not only is the absolute value important, but how much it has changed from previous years. Please ensure that there is not a dramatic rise from previous years.    Please Note: This information is included as a reference to help you better understand your lab results and is not be used for diagnosis.    Frequently asked questions    When should I take my blood pressure medication?    The latest studies show that taking your blood pressure medication at night is the best time. A recent study showed that this prevents more heart attacks and strokes. See the answer below from [place].     "Q. I've taken blood pressure medicines every morning for many years, and they keep my pressure under control. Recently, my doctor recommended taking them at bedtime, instead. Does that make sense?    A. It actually does make sense -- based on recent research. For many years, there have been at least three theoretical reasons for taking blood pressure medicines before bedtime. First, a body system that strongly affects blood pressure, called the renin-angiotensin system, has its peak activity during sleep. Second, circadian rhythms cause differences in the body chemistry at night compared with daytime. Third, most heart attacks occur in the morning, before medicines taken in the morning have a chance to "kick in."" [6].     When should I take my cholesterol medication?    It used to be recommended to take your cholesterol medication at night since the original " "statins that lowered cholesterol did not last 24 hours and most cholesterol synthesis is done at night. The long acting statins such as atorvastatin and rosuvastatin last 24 hours so they can be taken any time during the day. Simvastatin, pravastatin, fluvastatin, and lovastatin are shorter acting and should be taken at bed time.     Can supplements affect my blood work?    Yes they can. A very important supplement to not take for at least a week prior to your blood work is biotin. "Biotin supplement use is common and can lead to the false measurement of thyroid hormone in commonly used assays." [8.]    What are conditions that should not be addressed during a virtual visit?    There are some conditions that should not be evaluated via a virtual visit since optimal care is impossible. Chest pain, shortness of breath, lung conditions, abdominal pain, and any neurological complaint such as headache, dizziness, numbness ect.         When will I get commentary of my blood work?    I review all blood work that you get and I will send out commentary on this via [employer] within 72 hours. In most cases you will get a message from me sooner, but many times not all of the blood work is completed thus I usually wait until all results have returned. If there is a critical abnormality you should be contacted the same day you got blood work.     How frequently do I need to have visits to get controlled substances?    It is standard protocol to have a visit every 3 months if you are taking scheduled substances such as ADHD medications, psychiatric medications, and pain medications. This is to ensure your safety and monitor for any side effects.     When should I bring prior to a visit if I want to lose weight?    I recommend that you make a food diary for a week and fill out what you ate each day. You can bring this form in to your visit and I can look over it to suggest changes that you can make.     Which over the counter " medications should I avoid if I have decreased kidney function?    NSAIDs which includes ibuprofen (Advil, Motrin, Nuprin), naproxen (Aleve), meloxicam (Mobic) and diclofenac(Zorvolex, Voltaren) and ketorolac (Toradol) can damage your kidneys if you take this long term.Tylenol does not affect your kidney and thus is safe as long as you don't have liver disease.     Is there an [facility]?     Yes! The [facility] is located on the first floor of the [facility]. The address is listed below. You can get curbside pickup if you call their number at [phone]. One of the many benefits of using the [facility] is that the pharmacists can contact me directly if a cheaper alternative is available to save you money. They also see your note to know more about what the medication was prescribed for. I recommend this pharmacy since communication with me is quick in case any confusion arises on your medications.     [address] [place] [address].  [address] [place] [id number]  Phone: [phone]    Hours:  Monday - Friday  8:00 a.m. - 5:30 a.m.    Why is it not in my best interest to call in order to get an antibiotic?    Medicine is a complex field and many times the correct diagnosis is critical in order to provide the correct care. One of the most important goals of a healthcare provider is to ensure that no dangerous condition is masquerading as a mild illness. Specific questions are very important to obtain during an examination that provide a wealth of information to understand your illness. Health care providers are trained to investigate for signs that can be dangerous to your health. Messaging or calling the office in order to get an antibiotic can be very dangerous.     For example, many urinary tract infections can lead to an infection in the kidney that can result in a serious blood stream infection that can lead to hospitalization if not recognized. A cough can be caused by many different things and not  necessarily an infection. It is not uncommon that one assumes a cough is from an infection when it is actually caused by a blood clot in the lungs. This can lead to death. Determining your risk can only be performed after a thorough history and examination. A few sentences through e-mail is not enough.     What are some common symptoms that should be evaluated by the emergency department and not by phone or e-mail?    This does not include every symptom, but common examples of symptoms that should prompt one to go to the emergency department are chest pain, chest pressure, shortness of breath, difficulty breathing, abdominal pain, weakness or numbness or an extremity, sudden weakness or drooping on one side of the body, speaking difficulty, unusual or bad headache (particularly if it started suddenly), head injury, confusion, seizure, passing out, lightheaded, pain in arm or jaw, suddenly not able to speak, see, walk, or move, dizziness, neck stiffness, suicidal thoughts, testicular pain, cuts and wounds, severe pain, along with many others. This is not an inclusive list.     Outside Records    It is common to have an colonoscopy, mammogram, PAP smear, or eye exam done outside the [facility] system. Many times we do not get the records automatically sent to us. Please call your provider's office to notify them to fax us your records so that we can have the most up to date information. Your provider will review your outside results in order to provide you with the complete care that you deserve. We appreciate that you decided to choose us to be serving on your healthcare team and the more information we have about your health is essential.     If I have a psychiatric crisis what should I do?    If you ever feel that there is a risk of a harm to yourself we recommend to go to the emergency room. There is a National Suicide Prevention lifeline number 4-304-596-TALK which route you to the nearest crisis center. There is  "also a suicide hotline 3-427-WRFGXYT (1-893.184.6266).    Patient Education       Checking Your Blood Pressure at Home   The Basics   Written by the doctors and editors at [facility]   How is blood pressure measured? -- Blood pressure is usually measured with a device that goes around your upper arm. This is often done in a doctor's office. But some people also check their blood pressure themselves, at home or at work.  Blood pressure is explained with 2 numbers. For instance, your blood pressure might be "140 over 90." The first (top) number is the pressure inside your arteries when your heart is josé luis. The second (bottom) number is the pressure inside your arteries when your heart is relaxed. The table shows how doctors and nurses define high and normal blood pressure (table 1).  If your blood pressure gets too high, it puts you at risk for heart attack, stroke, and kidney disease. High blood pressure does not usually cause symptoms. But it can be serious.  What is a home blood pressure meter? -- A home blood pressure meter (or "monitor") is a device you can use to check your blood pressure yourself. It has a cuff that goes around your upper arm (figure 1). Some devices have a cuff that goes around your wrist instead. But doctors aren't sure if these work as well. The meter also has a small screen, or dial, that shows your blood pressure numbers.  There are also special meters you can wear for a day or 2. These are different because they automatically check your blood pressure throughout the day and night, even while you are sleeping. If your doctor thinks you should use one of these devices, they will talk to you about how to wear it.  Why do I need to check my blood pressure at home? -- If your doctor knows or suspects that you have high blood pressure, they might want you to check it at home. There are a few reasons for this. Your doctor might want to look at:  Whether your blood pressure measures the same " at home as it did in the doctor's office  How well your blood pressure medicines are working  Changes in your blood pressure, for example, if it goes up and down  People who check their own blood pressure at home usually do better at keeping it low.  How do I choose a home blood pressure meter? -- When choosing a home blood pressure meter, you will probably want to think about:  Cost - Some devices cost more than others. You should also check to see if your insurance will help pay for your device.  Size - It's important to make sure the cuff fits your arm comfortably. Your doctor or nurse can help you with this.  How easy it is to use - You should make sure you understand how to use the device. You also need to be able to read the numbers on the screen.  You do not need a prescription to buy a home blood pressure meter. You can buy them at most pharmacies or over the internet. Your doctor or nurse can help you choose the right device for you.  How do I check my blood pressure at home? -- Once you have a home blood pressure meter, your doctor or nurse should check it to make sure it fits you and works correctly.  When it's time to check your blood pressure:  Go to the bathroom and empty your bladder first. Having a full bladder can temporarily increase your blood pressure, making the results inaccurate.  Sit in a chair with your feet flat on the ground.  Try to breathe normally and stay calm.  Attach the cuff to your arm. Place the cuff directly on your skin, not over your clothing. The cuff should be tight enough to not slip down, but not uncomfortably tight.  Sit and relax for about 3 to 5 minutes with the cuff on.  Follow the directions that came with your device to start measuring your blood pressure. This might involve squeezing the bulb at the end of the tube to inflate the cuff (fill it with air). With some monitors, you just need to press a button to inflate the cuff. When the cuff fills with air, it feels  like someone is squeezing your arm, but it should not hurt. Then you will slowly deflate the cuff (let the air out of it), or it will deflate by itself. The screen or dial will show your blood pressure numbers.  Stay seated and relax for 1 minute, then measure your blood pressure again.  How often should I check my blood pressure? -- It depends. Different people need to follow different schedules. Your doctor or nurse will tell you how often to check your blood pressure, and when. Some people need to check their blood pressure twice a day, in the morning and evening.  Your doctor or nurse will probably tell you to keep track of your blood pressure for at least a few days (table 2). Then they will look at the numbers. The reason for this is that it's normal for your blood pressure to change a bit from day to day. For example, the numbers might change depending on whether you recently had caffeine, just exercised, or feel stressed. Checking your blood pressure over several days - or longer - will give your doctor or nurse a better idea of what is average for you.  How should I keep track of my blood pressure? -- Some blood pressure meters will record your numbers for you, or send them to your computer or smartphone. If yours does not do this, you will need to write them down. Your doctor or nurse can help you figure out the best way to keep track of the numbers.  What if my blood pressure is high? -- Your doctor or nurse will tell you what to do if your blood pressure is high when you check it at home. If you get a number that is higher than normal, measure it again to see if it is still high. If it is very high (above a certain number, which your doctor or nurse will tell you to watch out for), you should call your doctor right away.  If your blood pressure is only a little high, your doctor or nurse might tell you to keep checking it for a few more days or weeks, and then call if it does not go back down. Then they  "can help you decide what to do next.  All topics are updated as new evidence becomes available and our peer review process is complete.  This topic retrieved from [employer] on: Sep 21, 2021.  Topic 613792 Version 4.0  Release: 29.4.2 - C29.263  © 2021 UpToDate, Inc. and/or its affiliates. All rights reserved.  table 1: Definition of normal and high blood pressure  Level  Top number  Bottom number    High 130 or above 80 or above   Elevated 120 to 129 79 or below   Normal 119 or below 79 or below   These definitions are from the American College of Cardiology/American Heart Association. Other expert groups might use slightly different definitions.  "Elevated blood pressure" is a term doctor or nurses use as a warning. It means you do not yet have high blood pressure, but your blood pressure is not as low as it should be for good health.  Graphic 18411 Version 6.0  figure 1: Using a home blood pressure meter     This is an example of a person using a home blood pressure meter.  Graphic 534485 Version 1.0    Consumer Information Use and Disclaimer   This information is not specific medical advice and does not replace information you receive from your health care provider. This is only a brief summary of general information. It does NOT include all information about conditions, illnesses, injuries, tests, procedures, treatments, therapies, discharge instructions or life-style choices that may apply to you. You must talk with your health care provider for complete information about your health and treatment options. This information should not be used to decide whether or not to accept your health care provider's advice, instructions or recommendations. Only your health care provider has the knowledge and training to provide advice that is right for you. The use of this information is governed by the [employer] End User License Agreement, available at https://www.[employer]/en/solutions/[employer]x/about/becca.The use of " [employer] content is governed by the [employer] Terms of Use. ©2021 UpToDate, Inc. All rights reserved.  Copyright   © 2021 UpToDate, Inc. and/or its affiliates. All rights reserved.      Daily Blood Pressure Log    Please print this form to assist you in keeping track of your blood pressure at home.      Name:  Date of Birth:       Average Blood Pressure:           Date: Time  (a.m.) Blood  Pressure: Pulse  Rate: Time  (p.m.) Blood  Pressure : Pulse  Rate: Comments:   Sample 8:37 127/83 84    Stressful morning                                                                                                                                                                                                     Wishing you good health,     Jimbo Howe MD     References:  1-https://www.[employer]/speakers/eva_vertes  2-https://www.[employer].com.au/news/jhuiq-pta-34-cancers-that-can-nm-fdtsqq-bd-smoking/  3-https://www.cdc.gov/cancer/lung/basic_info/screening.htm  4-https://newsroom.heart.org/news/common-hypertension-medications-may-reduce-colorectal-cancer-risk  5-Goto A, Shaun M, Sawada N, et al. High hemoglobin A1c levels within the non-diabetic range are associated with the risk of all cancers. Int J Cancer. 2016;138(7):2631-3541. doi:10.1002/ijc.02022  6-https://www.health.[place].edu/newsletter_article/the-10-commandments-of-cancer-prevention  7-https://www.health.[place].edu/diseases-and-conditions/should-i-take-blood-pressure-medications-at-night  8-Melody MEJIA et al 2018 Prevalence of biotin supplement usage in outpatients and plasma biotin concentrations in patients presenting to the emergency department. Clin Biochem. Epub 2018 Jul 20. PMID: [id number].

## 2022-07-01 LAB — VIT B12 SERPL-MCNC: 426 NG/L (ref 180–914)

## 2022-07-02 LAB
ARSENIC BLD-MCNC: <1 NG/ML
CADMIUM BLD-MCNC: 0.3 NG/ML
CITY: NORMAL
COUNTY: NORMAL
GUARDIAN FIRST NAME: NORMAL
GUARDIAN LAST NAME: NORMAL
HOME PHONE: NORMAL
LEAD BLD-MCNC: 1.2 MCG/DL
MERCURY BLD-MCNC: 2 NG/ML
RACE: NORMAL
STATE: NORMAL
STREET ADDRESS: NORMAL
VENOUS/CAPILLARY: NORMAL
ZIP: NORMAL

## 2022-07-06 ENCOUNTER — TELEPHONE (OUTPATIENT)
Dept: FAMILY MEDICINE | Facility: CLINIC | Age: 67
End: 2022-07-06
Payer: MEDICARE

## 2022-07-06 LAB
HLA B27 INTERPRETATION: NORMAL
HLA-B27 RELATED AG QL: NEGATIVE
HLA-B27 RELATED AG QL: NORMAL

## 2022-07-06 NOTE — TELEPHONE ENCOUNTER
----- Message from Sujeybassem Vijaya sent at 7/6/2022  2:31 PM CDT -----  Regarding: Specimen Issue  There was an issue with the Vit B6 test ordered on this patient from 6/29/22     Unfortunately, the reference lab received whole blood and serum/plasma is required for testing. The order has been cancelled. You will need to reorder and contact the patient for recollection if clinically indicated.    If there are any questions, please call the [facility] lab at [phone] ext. [phone].  Anyone in the [facility] lab will be able to assist you.

## 2022-07-07 ENCOUNTER — OFFICE VISIT (OUTPATIENT)
Dept: URGENT CARE | Facility: CLINIC | Age: 67
End: 2022-07-07
Payer: MEDICARE

## 2022-07-07 VITALS
SYSTOLIC BLOOD PRESSURE: 107 MMHG | WEIGHT: 167.19 LBS | BODY MASS INDEX: 24.76 KG/M2 | HEIGHT: 69 IN | OXYGEN SATURATION: 97 % | DIASTOLIC BLOOD PRESSURE: 68 MMHG | RESPIRATION RATE: 20 BRPM | HEART RATE: 76 BPM | TEMPERATURE: 100 F

## 2022-07-07 DIAGNOSIS — U07.1 COVID: ICD-10-CM

## 2022-07-07 DIAGNOSIS — R05.9 COUGH: Primary | ICD-10-CM

## 2022-07-07 DIAGNOSIS — Z87.09 HISTORY OF ASTHMA: ICD-10-CM

## 2022-07-07 LAB
CTP QC/QA: YES
SARS-COV-2 AG RESP QL IA.RAPID: POSITIVE

## 2022-07-07 PROCEDURE — 87811 SARS-COV-2 COVID19 W/OPTIC: CPT | Mod: QW,CR,S$GLB,

## 2022-07-07 PROCEDURE — 87811 SARS CORONAVIRUS 2 ANTIGEN POCT, MANUAL READ: ICD-10-PCS | Mod: QW,CR,S$GLB,

## 2022-07-07 PROCEDURE — 99214 PR OFFICE/OUTPT VISIT, EST, LEVL IV, 30-39 MIN: ICD-10-PCS | Mod: S$GLB,CS,,

## 2022-07-07 PROCEDURE — 99214 OFFICE O/P EST MOD 30 MIN: CPT | Mod: S$GLB,CS,,

## 2022-07-07 RX ORDER — ALBUTEROL SULFATE 90 UG/1
2 AEROSOL, METERED RESPIRATORY (INHALATION) EVERY 6 HOURS PRN
Qty: 18 G | Refills: 0 | Status: SHIPPED | OUTPATIENT
Start: 2022-07-07 | End: 2024-03-19

## 2022-07-07 RX ORDER — BENZONATATE 200 MG/1
200 CAPSULE ORAL 3 TIMES DAILY PRN
Qty: 15 CAPSULE | Refills: 0 | Status: SHIPPED | OUTPATIENT
Start: 2022-07-07 | End: 2022-07-12

## 2022-07-07 NOTE — PROGRESS NOTES
"Subjective:       Patient ID: Dustin Owens is a 66 y.o. male.    Vitals:  height is 5' 9" (1.753 m) and weight is 75.8 kg (167 lb 3.2 oz). His temperature is 100.2 °F (37.9 °C). His blood pressure is 107/68 and his pulse is 76. His respiration is 20 and oxygen saturation is 97%.     Chief Complaint: Cough    Cough  This is a new problem. The current episode started in the past 7 days. The problem has been gradually worsening. The problem occurs constantly. The cough is productive of sputum. Associated symptoms include chills, a fever, headaches, nasal congestion and sweats. Pertinent negatives include no chest pain, ear pain, postnasal drip, sore throat or shortness of breath. He has tried OTC cough suppressant (mucinex (generic)) for the symptoms. The treatment provided mild relief. His past medical history is significant for asthma.       Constitution: Positive for chills and fever. Negative for activity change, appetite change, sweating and unexpected weight change.   HENT: Negative for ear pain, postnasal drip, sinus pain, sinus pressure and sore throat.    Cardiovascular: Negative for chest pain.   Eyes: Negative for blurred vision.   Respiratory: Positive for cough. Negative for chest tightness and shortness of breath.    Gastrointestinal: Negative for abdominal pain.   Neurological: Positive for headaches. Negative for dizziness, history of vertigo and altered mental status.   Psychiatric/Behavioral: Negative for altered mental status.       Objective:      Physical Exam   Constitutional: He is oriented to person, place, and time.  Non-toxic appearance. He does not appear ill. No distress.   HENT:   Head: Normocephalic.   Nose: Nose normal.   Eyes: Conjunctivae are normal. Extraocular movement intact   Cardiovascular: Normal rate, normal heart sounds and normal pulses.   Pulmonary/Chest: Effort normal and breath sounds normal. No respiratory distress.   Neurological: no focal deficit. He is alert and " oriented to person, place, and time.   Skin: Skin is not diaphoretic. Capillary refill takes 2 to 3 seconds.   Psychiatric: His behavior is normal. Mood normal.         Assessment:       1. Cough    2. COVID    3. History of asthma          Plan:         Cough  -     SARS Coronavirus 2 Antigen, POCT Manual Read  -     benzonatate (TESSALON) 200 MG capsule; Take 1 capsule (200 mg total) by mouth 3 (three) times daily as needed for Cough.  Dispense: 15 capsule; Refill: 0    COVID    History of asthma  -     albuterol (VENTOLIN HFA) 90 mcg/actuation inhaler; Inhale 2 puffs into the lungs every 6 (six) hours as needed for Wheezing. Rescue  Dispense: 18 g; Refill: 0         2  Covid risk score 2. Covid positive. Patient has pmhx of asthma as a child but has not had issues since. Lungs clear on exam. Will do supportive treatment, return precautions given.

## 2022-07-07 NOTE — PATIENT INSTRUCTIONS
Symptomatic treatment to include:    Rest, increase fluid intake to include electrolyte replacement  Ibuprofen/Tylenol as directed for fever, sore throat, body aches  Zrytec and flonase for sinus symptoms  Tessalon perles cough pills as needed day or night  Mucinex D over the counter as directed for sinus congestion.  Coricidin HBP if you have high blood pressure.  Warm, salt water gargles, over the counter throat lozenges or sprays as desires.   ER for difficulty breathing not relieved by rest, excessive lethargy and/or change in mental status, oxygen level less than 93% or worsening of symptoms.   Follow CDC isolation guidelines as provided

## 2022-07-08 ENCOUNTER — TELEPHONE (OUTPATIENT)
Dept: NEUROLOGY | Facility: CLINIC | Age: 67
End: 2022-07-08
Payer: MEDICARE

## 2022-07-08 NOTE — TELEPHONE ENCOUNTER
Spoke with the pt, reports he is unable to attend his appt on July 12th as his insurance will not pay. The pt plans to sign up for Medicare and call back to schedule with Joshua for weakness, numbness and pain to BUE.

## 2022-07-08 NOTE — TELEPHONE ENCOUNTER
----- Message from Flori Berrios sent at 7/8/2022  2:38 PM CDT -----  Regarding: Insurance  Hello,   If  someone  could  please  call this  patient  about  his   appointment,  Bassam  is  saying  he  is a  Direct  care   only  and  he  is  trying to  get it  straightened out  and  needs to  talk  to  yall ,about his  appointment   Please         Thank You   Flori berrios   [facility]   [phone]

## 2022-07-15 ENCOUNTER — TELEPHONE (OUTPATIENT)
Dept: FAMILY MEDICINE | Facility: CLINIC | Age: 67
End: 2022-07-15
Payer: MEDICARE

## 2022-07-15 NOTE — TELEPHONE ENCOUNTER
----- Message from Rosario Romero LPN sent at 7/15/2022  4:02 PM CDT -----    ----- Message -----  From: Yoselin Grossman  Sent: 7/15/2022   2:51 PM CDT  To: Jaky SHANNON Staff    Type: Patient Call Back         Who called: Kacey with [employer]          What is the request in detail: regarding the Pt disability Form that was sent on 5/24/22          Can the clinic reply by [facility]?no          Would the patient rather a call back or a response via [facility]?call back          Best call back number:[phone]         Additional Information:           Thank You

## 2022-07-19 NOTE — TELEPHONE ENCOUNTER
LM patient call clinic about disability paperwork, I do not have any paper in my hand,  Notes did no mention any disability .    Statement Selected

## 2022-07-22 ENCOUNTER — TELEPHONE (OUTPATIENT)
Dept: ORTHOPEDICS | Facility: CLINIC | Age: 67
End: 2022-07-22
Payer: MEDICARE

## 2022-07-22 NOTE — TELEPHONE ENCOUNTER
"Returned call to no avail. Unable to leave a message. Automated service stated, "There is no one available to take your call. Good bye."   "

## 2022-07-22 NOTE — TELEPHONE ENCOUNTER
----- Message from Frankie Hollins sent at 7/22/2022  2:03 PM CDT -----  Regarding: Disability Paperwork  Contact: Kinga ([employer])  Name of Who is Calling: Kinga ([employer])      What is the request in detail: Would like to speak with staff in regards to confirming disability paperwork was received. Please advise      Can the clinic reply by [facility]:       [facility] [phone]   fax: [phone]

## 2022-07-29 ENCOUNTER — TELEPHONE (OUTPATIENT)
Dept: ORTHOPEDICS | Facility: CLINIC | Age: 67
End: 2022-07-29
Payer: MEDICARE

## 2022-07-29 NOTE — TELEPHONE ENCOUNTER
----- Message from Pita Landers sent at 7/29/2022  1:53 PM CDT -----  Contact: Rosanna ([employer])  Type: Patient Call Back    Who called: Rosanna ([employer])    What is the request in detail: The department states that they have been trying to reach the office with no response. Paperwork was sent over      Can the clinic reply by [facility]?    Would the patient rather a call back or a response via [facility]?     Best call back number:  option 3     Additional Information:

## 2022-08-22 ENCOUNTER — OFFICE VISIT (OUTPATIENT)
Dept: ORTHOPEDICS | Facility: CLINIC | Age: 67
End: 2022-08-22
Payer: MEDICARE

## 2022-08-22 VITALS — RESPIRATION RATE: 16 BRPM | HEIGHT: 69 IN | WEIGHT: 167 LBS | BODY MASS INDEX: 24.73 KG/M2

## 2022-08-22 DIAGNOSIS — M25.532 BILATERAL WRIST PAIN: ICD-10-CM

## 2022-08-22 DIAGNOSIS — G56.03 BILATERAL CARPAL TUNNEL SYNDROME: Primary | ICD-10-CM

## 2022-08-22 DIAGNOSIS — M25.531 BILATERAL WRIST PAIN: ICD-10-CM

## 2022-08-22 PROCEDURE — 99213 OFFICE O/P EST LOW 20 MIN: CPT | Mod: S$PBB,,, | Performed by: FAMILY MEDICINE

## 2022-08-22 PROCEDURE — 99214 OFFICE O/P EST MOD 30 MIN: CPT | Mod: PBBFAC,PN | Performed by: FAMILY MEDICINE

## 2022-08-22 PROCEDURE — 99213 PR OFFICE/OUTPT VISIT, EST, LEVL III, 20-29 MIN: ICD-10-PCS | Mod: S$PBB,,, | Performed by: FAMILY MEDICINE

## 2022-08-22 PROCEDURE — 99999 PR PBB SHADOW E&M-EST. PATIENT-LVL IV: ICD-10-PCS | Mod: PBBFAC,,, | Performed by: FAMILY MEDICINE

## 2022-08-22 PROCEDURE — 99999 PR PBB SHADOW E&M-EST. PATIENT-LVL IV: CPT | Mod: PBBFAC,,, | Performed by: FAMILY MEDICINE

## 2022-08-22 NOTE — PROGRESS NOTES
Subjective:      Patient ID: Dustin Owens is a 67 y.o. male.    Chief Complaint: Pain of the Right Wrist and Pain of the Left Wrist    Here for follow-up of bilateral carpal tunnel syndrome worse on the right.  Status post ultrasound-guided carpal tunnel injection done 06/28/2022.  States that he has had very little relief from the injection.  Continues to have pain in the right wrist.  He is having less numbness but notes that when his hands get cold the numbness seems to return worse.  Has already had EMG which demonstrated moderate bilateral carpal tunnel syndrome.  States he is still taking gabapentin occasionally for milder symptoms.      Review of Systems   Constitutional: Negative for chills and decreased appetite.   HENT: Negative for congestion and sore throat.    Eyes: Negative for blurred vision.   Cardiovascular: Negative for chest pain, dyspnea on exertion and palpitations.   Respiratory: Negative for cough and shortness of breath.    Skin: Negative for rash.   Neurological: Negative for difficulty with concentration, disturbances in coordination and headaches.   Psychiatric/Behavioral: Negative for altered mental status, depression, hallucinations, memory loss and suicidal ideas.         Objective:            General    Nursing note and vitals reviewed.  Constitutional: He is oriented to person, place, and time. He appears well-developed and well-nourished.   HENT:   Nose: Nose normal.   Eyes: EOM are normal. Pupils are equal, round, and reactive to light.   Neck: Neck supple.   Cardiovascular: Normal rate.    Pulmonary/Chest: Effort normal.   Abdominal: Soft.   Neurological: He is alert and oriented to person, place, and time. He has normal reflexes.   Psychiatric: He has a normal mood and affect. His behavior is normal. Judgment and thought content normal.             Right Hand/Wrist Exam     Pain   Wrist - The patient exhibits pain of the flexor/pronator group.    Tenderness   The patient is  tender to palpation of the jose area.    Tests   Phalens sign: positive  Tinel's sign (median nerve): positive  Finkelstein's test: negative    Atrophy   Thenar:  positive    Other     Neuorologic Exam    Median Distribution: abnormal  Ulnar Distribution: normal  Radial Distribution: normal      Left Hand/Wrist Exam     Pain   Wrist - The patient exhibits pain of the flexor/pronator group.    Tenderness   The patient is tender to palpation of the jose area.     Tests   Phalens sign: positive  Tinel's sign (median nerve): positive  Finkelstein's test: negative    Atrophy  Thenar:  Negative    Other     Sensory Exam  Median Distribution: abnormal  Ulnar Distribution: normal  Radial Distribution: normal          Muscle Strength   Right Upper Extremity   Wrist extension: 5/5   Wrist flexion: 5/5   : 4/5   Left Upper Extremity  Wrist extension: 5/5   Wrist flexion: 5/5   :  5/5     Vascular Exam       Capillary Refill  Right Hand: normal capillary refill  Left Hand: normal capillary refill                    Assessment:       Encounter Diagnoses   Name Primary?    Bilateral carpal tunnel syndrome Yes    Bilateral wrist pain           Plan:       Dustin was seen today for pain and pain.    Diagnoses and all orders for this visit:    Bilateral carpal tunnel syndrome  -     Ambulatory referral/consult to Orthopedics; Future    Bilateral wrist pain    He has tried and failed conservative measures for carpal tunnel syndrome on the right wrist.  Had a long discussion with patient about future treatment options.  Recommended that he strongly consider having carpal tunnel release surgery.  Will refer to Dr. Angel dan is soonest available appointment.

## 2022-08-23 ENCOUNTER — TELEPHONE (OUTPATIENT)
Dept: ORTHOPEDICS | Facility: CLINIC | Age: 67
End: 2022-08-23
Payer: MEDICARE

## 2022-08-23 NOTE — TELEPHONE ENCOUNTER
Pt referred to Dr. Caldera for evaluation. Pt is wanting us to fill out disability forms. Please advise if pt should wait until he sees Dr. Caldera for evaluation    forms never received to be filled out.

## 2022-08-23 NOTE — TELEPHONE ENCOUNTER
----- Message from Mallika Lu sent at 8/23/2022  9:11 AM CDT -----  Regarding: [employer] disabilty called  Name of Who is Calling: CHANA ARMSTRONG [[id number]] Abby ( citizen disability)      What is the request in detail: requesting a call back from the office to see if forms that were faxed over were received by the office. Please advise       Can the clinic reply by [facility]:No       What Number to Call Back if not in [facility]: [phone] option 3

## 2022-08-25 ENCOUNTER — TELEPHONE (OUTPATIENT)
Dept: ORTHOPEDICS | Facility: CLINIC | Age: 67
End: 2022-08-25
Payer: MEDICARE

## 2022-08-25 NOTE — TELEPHONE ENCOUNTER
Advised that pt referred to another provider for treatment. information given so forms can be sent to correct office.

## 2022-08-25 NOTE — TELEPHONE ENCOUNTER
----- Message from Mallika Lu sent at 8/25/2022  8:45 AM CDT -----  Regarding: [employer] disability  Name of Who is Calling: CHANA ARMSTRONG [[id number]] Abby ( citizen disabilty)      What is the request in detail: requesting a email address to send over disability forms for patient. Please advise       Can the clinic reply by [facility]: No      What [facility] Back [facility]: [phone] option 3

## 2022-08-29 ENCOUNTER — TELEPHONE (OUTPATIENT)
Dept: ORTHOPEDICS | Facility: CLINIC | Age: 67
End: 2022-08-29
Payer: MEDICARE

## 2022-08-29 NOTE — TELEPHONE ENCOUNTER
LVM for patient with fax info.    Kay Rueda MS, OTC  Clinical & OR Assistant to [facility]. [facility]  [phone]          ----- Message from Tolu Segura sent at 8/29/2022  8:42 AM CDT -----  Type: Needs Medical Advice  Who Called:  Manish/ Dharmesh Rubin     Best Call Back Number: [phone]  Additional Information: Caller states that he would like a callback regarding the status of a fax that was sent on 08/25/22    # [id number]     none

## 2022-08-31 ENCOUNTER — PATIENT MESSAGE (OUTPATIENT)
Dept: FAMILY MEDICINE | Facility: CLINIC | Age: 67
End: 2022-08-31
Payer: MEDICARE

## 2022-09-01 ENCOUNTER — PATIENT MESSAGE (OUTPATIENT)
Dept: INFECTIOUS DISEASES | Facility: CLINIC | Age: 67
End: 2022-09-01
Payer: MEDICARE

## 2022-09-01 RX ORDER — SILDENAFIL 50 MG/1
50 TABLET, FILM COATED ORAL DAILY PRN
Qty: 15 TABLET | Refills: 6 | Status: SHIPPED | OUTPATIENT
Start: 2022-09-01 | End: 2023-04-06 | Stop reason: SDUPTHER

## 2022-09-01 NOTE — TELEPHONE ENCOUNTER
No new care gaps identified.  [facility] Embedded Care Gaps. Reference number: [id number]. 8/31/2022   10:06:58 PM CDT

## 2022-09-02 ENCOUNTER — OFFICE VISIT (OUTPATIENT)
Dept: INFECTIOUS DISEASES | Facility: CLINIC | Age: 67
End: 2022-09-02
Payer: MEDICARE

## 2022-09-02 ENCOUNTER — CLINICAL SUPPORT (OUTPATIENT)
Dept: INFECTIOUS DISEASES | Facility: CLINIC | Age: 67
End: 2022-09-02
Payer: MEDICARE

## 2022-09-02 VITALS
TEMPERATURE: 98 F | SYSTOLIC BLOOD PRESSURE: 120 MMHG | WEIGHT: 172.81 LBS | HEIGHT: 69 IN | BODY MASS INDEX: 25.6 KG/M2 | DIASTOLIC BLOOD PRESSURE: 75 MMHG | HEART RATE: 80 BPM

## 2022-09-02 DIAGNOSIS — Z71.84 TRAVEL ADVICE ENCOUNTER: Primary | ICD-10-CM

## 2022-09-02 DIAGNOSIS — Z71.84 TRAVEL ADVICE ENCOUNTER: ICD-10-CM

## 2022-09-02 PROCEDURE — 90717 YELLOW FEVER VACCINE SUBQ: CPT | Mod: PBBFAC

## 2022-09-02 PROCEDURE — 99213 OFFICE O/P EST LOW 20 MIN: CPT | Mod: PBBFAC,25 | Performed by: PHYSICIAN ASSISTANT

## 2022-09-02 PROCEDURE — 99999 PR PBB SHADOW E&M-EST. PATIENT-LVL III: CPT | Mod: PBBFAC,,, | Performed by: PHYSICIAN ASSISTANT

## 2022-09-02 PROCEDURE — 90472 IMMUNIZATION ADMIN EACH ADD: CPT | Mod: PBBFAC

## 2022-09-02 PROCEDURE — 99999 PR PBB SHADOW E&M-EST. PATIENT-LVL III: ICD-10-PCS | Mod: PBBFAC,,, | Performed by: PHYSICIAN ASSISTANT

## 2022-09-02 PROCEDURE — 99401 PREV MED CNSL INDIV APPRX 15: CPT | Mod: S$PBB,,, | Performed by: PHYSICIAN ASSISTANT

## 2022-09-02 PROCEDURE — 99401 PR PREVENT COUNSEL,INDIV,15 MIN: ICD-10-PCS | Mod: S$PBB,,, | Performed by: PHYSICIAN ASSISTANT

## 2022-09-02 RX ORDER — ATOVAQUONE AND PROGUANIL HYDROCHLORIDE 250; 100 MG/1; MG/1
1 TABLET, FILM COATED ORAL DAILY
Qty: 17 TABLET | Refills: 0 | Status: SHIPPED | OUTPATIENT
Start: 2022-09-02 | End: 2022-09-19

## 2022-09-02 RX ORDER — AZITHROMYCIN 500 MG/1
1000 TABLET, FILM COATED ORAL DAILY
Qty: 2 TABLET | Refills: 0 | Status: SHIPPED | OUTPATIENT
Start: 2022-09-02 | End: 2022-09-03

## 2022-09-02 NOTE — PROGRESS NOTES
Patient received Typhoid vaccine IM to the right deltoid.  And also yellow fever sub Q to the left arm w/yellow card documentation.  Tolerated well and left in NAD

## 2022-09-02 NOTE — PROGRESS NOTES
Subjective:      Chief Complaint:   Chief Complaint   Patient presents with    Travel Consult       History of Present Illness    Patient  67 y.o. male who presents today for routine pretravel consultation.  The patient reports a past medical history of asthma.  The patient reports the following medication allergies; amoxicillin.  The patient reports the following food allergies; none .  The patient will be traveling to [facility] on 10/3/22.  The patient will be at this destination for 8 days.  The patient will be lodging at a hotel ([employer] 3 days) [facility] x 5 days in hotel.  The has travelled to the following other countries in the past; [place] 9 months ago, was in [place], [place], [place], [place].  The patient reports that they up to date all their childhood vaccinations.  The patient reports receipt of the following travel related vaccinations; none.  The purpose of this trip is tour.    Immunization History   Administered Date(s) Administered    COVID-19, MRNA, LN-S, PF (Pfizer) (Purple Cap) 03/11/2021    COVID-19, vector-nr, rS-Ad26, PF (bookjam) 03/11/2021    Influenza - Quadrivalent - PF *Preferred* (6 months and older) 01/04/2021    Influenza - Trivalent - PF (ADULT) 12/02/2019    Tdap 09/22/2017     Assessment:     Pre-Travel clinic assessment    Plan:   Patient specific risks:         The Patient was provided with an extensive travel guidance packet which provides travel information specific to the patients itinerary.    The patient's immunization history was reviewed and, based on the patient's itinerary, immunizations were ordered.     -Infectious Disease risks:       Mosquito Borne pathogens:  Reviewed basic mosquito avoidance precautions including wearing long sleeve clothing and insect repellant. The patient was instructed to purchase insect repellent containing DEET or [place] and apply according to repellent label instructions. Malarone (17# tablets) was prescribed for malaria prophylaxis and  possible side effects were reviewed         Food Borne pathogens:  Reviewed basic hand, food and water sanitation precautions.  Patient instructed to take hand  on their trip.  Hepatitis A today? Azithromycin as needed for severe diarrhea? The patient was instructed to purchase Imodium over the counter to take in case diarrhea (without blood or fever) develops.      Blood Borne Pathogens: Patient has/has not received the hepatitis b vaccination series.     STDs:  Risk of STDs reviewed with the patient. Low/High risk?     Routine:   Up to date on Tdap/Influenza vaccine?    Environmental risks:   The patient was counseled on:  Precautions to minimize risk/exposure to crime and motor vehicle accidents  Precautions against development of DVT during flight  Precautions to prevent exposure to rabies and seek treatment for possible exposures  Precautions against sun exposure  Personal and travel safety    The patient was instructed to contact us if problems develop after travel.      Vaccines recommended  1. Influenza yearly  2. Polio vaccine booster  3. Hepatitis A vaccine due day 0, 6 months  4. Covid vaccine booster due   5. Yellow fever vaccine today  6. Meningitis vaccine       Malaria ppx and travelers diarrhea rx given

## 2022-09-13 ENCOUNTER — TELEPHONE (OUTPATIENT)
Dept: FAMILY MEDICINE | Facility: CLINIC | Age: 67
End: 2022-09-13
Payer: MEDICARE

## 2022-09-13 NOTE — TELEPHONE ENCOUNTER
Had spoken to patient and stated that he was going to main campus to get Yellow Fever vaccine./mimi  
Applied

## 2022-09-22 ENCOUNTER — TELEPHONE (OUTPATIENT)
Dept: URGENT CARE | Facility: CLINIC | Age: 67
End: 2022-09-22

## 2022-09-22 ENCOUNTER — OFFICE VISIT (OUTPATIENT)
Dept: URGENT CARE | Facility: CLINIC | Age: 67
End: 2022-09-22
Payer: MEDICARE

## 2022-09-22 ENCOUNTER — HOSPITAL ENCOUNTER (OUTPATIENT)
Dept: RADIOLOGY | Facility: HOSPITAL | Age: 67
Discharge: HOME | End: 2022-09-22
Attending: NURSE PRACTITIONER
Payer: MEDICARE

## 2022-09-22 VITALS
HEIGHT: 70 IN | DIASTOLIC BLOOD PRESSURE: 64 MMHG | BODY MASS INDEX: 24.43 KG/M2 | RESPIRATION RATE: 18 BRPM | OXYGEN SATURATION: 96 % | SYSTOLIC BLOOD PRESSURE: 96 MMHG | HEART RATE: 87 BPM | WEIGHT: 170.63 LBS | TEMPERATURE: 98 F

## 2022-09-22 DIAGNOSIS — R10.31 GROIN DISCOMFORT, RIGHT: ICD-10-CM

## 2022-09-22 DIAGNOSIS — L02.214 ABSCESS OF GROIN, RIGHT: ICD-10-CM

## 2022-09-22 DIAGNOSIS — R19.09 RIGHT GROIN MASS: Primary | ICD-10-CM

## 2022-09-22 DIAGNOSIS — R59.9 LYMPH NODE ENLARGEMENT: ICD-10-CM

## 2022-09-22 DIAGNOSIS — M79.89 MASS OF DEEP SOFT TISSUE OF GROIN: ICD-10-CM

## 2022-09-22 PROCEDURE — 76882 US SOFT TISSUE, GROIN RIGHT: ICD-10-PCS | Mod: 26,RT,, | Performed by: RADIOLOGY

## 2022-09-22 PROCEDURE — 76882 US LMTD JT/FCL EVL NVASC XTR: CPT | Mod: TC,RT

## 2022-09-22 PROCEDURE — 99214 PR OFFICE/OUTPT VISIT, EST, LEVL IV, 30-39 MIN: ICD-10-PCS | Mod: S$GLB,,, | Performed by: NURSE PRACTITIONER

## 2022-09-22 PROCEDURE — 99214 OFFICE O/P EST MOD 30 MIN: CPT | Mod: S$GLB,,, | Performed by: NURSE PRACTITIONER

## 2022-09-22 PROCEDURE — 76882 US LMTD JT/FCL EVL NVASC XTR: CPT | Mod: 26,RT,, | Performed by: RADIOLOGY

## 2022-09-22 RX ORDER — SULFAMETHOXAZOLE AND TRIMETHOPRIM 800; 160 MG/1; MG/1
1 TABLET ORAL 2 TIMES DAILY
Qty: 14 TABLET | Refills: 0 | Status: SHIPPED | OUTPATIENT
Start: 2022-09-22 | End: 2022-09-29

## 2022-09-22 NOTE — PATIENT INSTRUCTIONS
Go to [facility] for outpatient imaging    General Discharge Instructions   If you were prescribed a narcotic or controlled medication, do not drive or operate heavy equipment or machinery while taking these medications.  If you were prescribed antibiotics, please take them to completion.  You must understand that you've received an Urgent Care treatment only and that you may be released before all your medical problems are known or treated. You, the patient, will arrange for follow up care as instructed.  Follow up with your PCP or specialty clinic as directed in the next 1-2 weeks if not improved or as needed.  You can call [phone] to schedule an appointment with the appropriate provider.  If your condition worsens we recommend that you receive another evaluation at the emergency room immediately or contact your primary medical clinics after hours call service to discuss your concerns.  Please return here or go to the Emergency Department for any concerns or worsening of condition.      WE CANNOT RULE OUT ALL POSSIBLE CAUSES OF YOUR SYMPTOMS IN THE URGENT CARE SETTING PLEASE GO TO THE ER IF YOU FEELS YOUR CONDITION IS WORSENING OR YOU WOULD LIKE EMERGENT EVALUATION.

## 2022-09-22 NOTE — TELEPHONE ENCOUNTER
Discussed US results with patient. Will send in antibiotics. Answered all questions and concerns for pt. He will follow up with his PCP.

## 2022-09-22 NOTE — PROGRESS NOTES
"Subjective:       Patient ID: Dustin Owens is a 67 y.o. male.    Vitals:  height is 5' 10" (1.778 m) and weight is 77.4 kg (170 lb 9.6 oz). His temperature is 98.2 °F (36.8 °C). His blood pressure is 96/64 and his pulse is 87. His respiration is 18 and oxygen saturation is 96%.     Chief Complaint: Groin Pain    Pt states he has a hard painful lump/knot on side of his groin area x's 1 week. Denies urinary symptoms. No fever or chills.   ROS    Objective:      Physical Exam   Constitutional:  Non-toxic appearance. He does not appear ill. No distress.   HENT:   Head: Normocephalic and atraumatic.   Ears:   Right Ear: External ear normal.   Left Ear: External ear normal.   Pulmonary/Chest: Effort normal.   Abdominal: Normal appearance.   Genitourinary:    Testes normal.           Neurological: He is alert.   Skin: Skin is not diaphoretic.   Nursing note and vitals reviewed.chaperone present (YVONNE Choudhary)       US Soft Tissue, Groin Right    Result Date: 9/22/2022  EXAMINATION: US SOFT TISSUE, GROIN RIGHT CLINICAL HISTORY: right groin pain/ mass;  Right lower quadrant pain TECHNIQUE: Scanning was performed over the right groin. COMPARISON: None FINDINGS: In the area of the palpable abnormality in the subcutaneous fat there is a area of hypoechogenicity which appears avascular which measures 8 x 8 x 4 mm.  This is surrounded by Harshad normal appearing and somewhat thickened subcutaneous fat.  This could represent an inflamed lymph node or small area of abscess formation.  No definite hernia or vascular mass is identified. Clinical correlation is advised. Electronically signed by: Simeon Gil MD Date:    09/22/2022 Time:    18:03    Assessment:       1. Right groin mass    2. Groin discomfort, right    3. Mass of deep soft tissue of groin    4. Abscess of groin, right    5. Lymph node enlargement          Plan:     In the area of the palpable abnormality in the subcutaneous fat there is a area of hypoechogenicity " which appears avascular which measures 8 x 8 x 4 mm.  This is surrounded by Harshad normal appearing and somewhat thickened subcutaneous fat.  This could represent an inflamed lymph node or small area of abscess formation.  No definite hernia or vascular mass is identified. Clinical correlation is advised. Advised to complete antibiotics as discussed. Follow up with PCP if no improvement.   Right groin mass  -     Cancel: US Soft Tissue, Groin Right; Future; Expected date: 09/22/2022  -     US Soft Tissue, Groin Right    Groin discomfort, right  -     Cancel: US Soft Tissue, Groin Right; Future; Expected date: 09/22/2022  -     CT/NG, T. vaginalis; Future; Expected date: 09/22/2022  -     POCT Urinalysis, Dipstick, Automated, W/O Scope  -     US Soft Tissue, Groin Right    Mass of deep soft tissue of groin  -     Cancel: US Soft Tissue, Groin Right; Future; Expected date: 09/22/2022  -     US Soft Tissue, Groin Right    Abscess of groin, right  -     sulfamethoxazole-trimethoprim 800-160mg (BACTRIM DS) 800-160 mg Tab; Take 1 tablet by mouth 2 (two) times daily. for 7 days  Dispense: 14 tablet; Refill: 0    Lymph node enlargement  -     sulfamethoxazole-trimethoprim 800-160mg (BACTRIM DS) 800-160 mg Tab; Take 1 tablet by mouth 2 (two) times daily. for 7 days  Dispense: 14 tablet; Refill: 0                 Patient Instructions    Go to [facility] for outpatient imaging    General Discharge Instructions   If you were prescribed a narcotic or controlled medication, do not drive or operate heavy equipment or machinery while taking these medications.  If you were prescribed antibiotics, please take them to completion.  You must understand that you've received an Urgent Care treatment only and that you may be released before all your medical problems are known or treated. You, the patient, will arrange for follow up care as instructed.  Follow up with your PCP or specialty clinic as directed in the next 1-2 weeks if  not improved or as needed.  You can call [phone] to schedule an appointment with the appropriate provider.  If your condition worsens we recommend that you receive another evaluation at the emergency room immediately or contact your primary medical clinics after hours call service to discuss your concerns.  Please return here or go to the Emergency Department for any concerns or worsening of condition.      WE CANNOT RULE OUT ALL POSSIBLE CAUSES OF YOUR SYMPTOMS IN THE URGENT CARE SETTING PLEASE GO TO THE ER IF YOU FEELS YOUR CONDITION IS WORSENING OR YOU WOULD LIKE EMERGENT EVALUATION.

## 2022-09-23 LAB
C TRACH RRNA SPEC QL NAA+PROBE: NEGATIVE
N GONORRHOEA RRNA SPEC QL NAA+PROBE: NEGATIVE
T VAGINALIS RRNA SPEC QL NAA+PROBE: NEGATIVE

## 2022-09-26 ENCOUNTER — TELEPHONE (OUTPATIENT)
Dept: URGENT CARE | Facility: CLINIC | Age: 67
End: 2022-09-26
Payer: MEDICARE

## 2022-09-26 NOTE — TELEPHONE ENCOUNTER
----- Message from Kimberlee Taveras NP sent at 9/24/2022  9:33 AM CDT -----  Please inform patient that the urine test came back negative for chlamydia, gonorrhea and trichomonas.

## 2022-12-30 ENCOUNTER — HOSPITAL ENCOUNTER (EMERGENCY)
Facility: HOSPITAL | Age: 67
Discharge: HOME | End: 2022-12-30
Attending: EMERGENCY MEDICINE
Payer: MEDICARE

## 2022-12-30 VITALS
RESPIRATION RATE: 18 BRPM | BODY MASS INDEX: 25.18 KG/M2 | DIASTOLIC BLOOD PRESSURE: 96 MMHG | WEIGHT: 170 LBS | SYSTOLIC BLOOD PRESSURE: 172 MMHG | TEMPERATURE: 99 F | OXYGEN SATURATION: 99 % | HEART RATE: 69 BPM | HEIGHT: 69 IN

## 2022-12-30 DIAGNOSIS — K92.1 BLOOD IN STOOL: Primary | ICD-10-CM

## 2022-12-30 LAB
ALBUMIN SERPL BCP-MCNC: 4 G/DL (ref 3.5–5.2)
ALP SERPL-CCNC: 83 U/L (ref 55–135)
ALT SERPL W/O P-5'-P-CCNC: 36 U/L (ref 10–44)
ANION GAP SERPL CALC-SCNC: 8 MMOL/L (ref 8–16)
AST SERPL-CCNC: 29 U/L (ref 10–40)
BASOPHILS # BLD AUTO: 0.04 K/UL (ref 0–0.2)
BASOPHILS NFR BLD: 0.8 % (ref 0–1.9)
BILIRUB SERPL-MCNC: 0.7 MG/DL (ref 0.1–1)
BILIRUB UR QL STRIP: NEGATIVE
BUN SERPL-MCNC: 10 MG/DL (ref 8–23)
CALCIUM SERPL-MCNC: 9.1 MG/DL (ref 8.7–10.5)
CHLORIDE SERPL-SCNC: 106 MMOL/L (ref 95–110)
CLARITY UR: CLEAR
CO2 SERPL-SCNC: 26 MMOL/L (ref 23–29)
COLOR UR: YELLOW
CREAT SERPL-MCNC: 1.2 MG/DL (ref 0.5–1.4)
DIFFERENTIAL METHOD: ABNORMAL
EOSINOPHIL # BLD AUTO: 0.3 K/UL (ref 0–0.5)
EOSINOPHIL NFR BLD: 5.5 % (ref 0–8)
ERYTHROCYTE [DISTWIDTH] IN BLOOD BY AUTOMATED COUNT: 12.9 % (ref 11.5–14.5)
EST. GFR  (NO RACE VARIABLE): >60 ML/MIN/1.73 M^2
GLUCOSE SERPL-MCNC: 91 MG/DL (ref 70–110)
GLUCOSE UR QL STRIP: NEGATIVE
HCT VFR BLD AUTO: 45.8 % (ref 40–54)
HGB BLD-MCNC: 14.6 G/DL (ref 14–18)
HGB UR QL STRIP: ABNORMAL
IMM GRANULOCYTES # BLD AUTO: 0.01 K/UL (ref 0–0.04)
IMM GRANULOCYTES NFR BLD AUTO: 0.2 % (ref 0–0.5)
KETONES UR QL STRIP: NEGATIVE
LEUKOCYTE ESTERASE UR QL STRIP: NEGATIVE
LIPASE SERPL-CCNC: 26 U/L (ref 4–60)
LYMPHOCYTES # BLD AUTO: 2 K/UL (ref 1–4.8)
LYMPHOCYTES NFR BLD: 39.9 % (ref 18–48)
MCH RBC QN AUTO: 28.1 PG (ref 27–31)
MCHC RBC AUTO-ENTMCNC: 31.9 G/DL (ref 32–36)
MCV RBC AUTO: 88 FL (ref 82–98)
MONOCYTES # BLD AUTO: 0.5 K/UL (ref 0.3–1)
MONOCYTES NFR BLD: 10.7 % (ref 4–15)
NEUTROPHILS # BLD AUTO: 2.2 K/UL (ref 1.8–7.7)
NEUTROPHILS NFR BLD: 42.9 % (ref 38–73)
NITRITE UR QL STRIP: NEGATIVE
NRBC BLD-RTO: 0 /100 WBC
PH UR STRIP: 6 [PH] (ref 5–8)
PLATELET # BLD AUTO: 166 K/UL (ref 150–450)
PMV BLD AUTO: 10 FL (ref 9.2–12.9)
POTASSIUM SERPL-SCNC: 4.1 MMOL/L (ref 3.5–5.1)
PROT SERPL-MCNC: 7.1 G/DL (ref 6–8.4)
PROT UR QL STRIP: NEGATIVE
RBC # BLD AUTO: 5.19 M/UL (ref 4.6–6.2)
SODIUM SERPL-SCNC: 140 MMOL/L (ref 136–145)
SP GR UR STRIP: 1.01 (ref 1–1.03)
URN SPEC COLLECT METH UR: ABNORMAL
UROBILINOGEN UR STRIP-ACNC: NEGATIVE EU/DL
WBC # BLD AUTO: 5.06 K/UL (ref 3.9–12.7)

## 2022-12-30 PROCEDURE — 85025 COMPLETE CBC W/AUTO DIFF WBC: CPT | Performed by: PHYSICIAN ASSISTANT

## 2022-12-30 PROCEDURE — 36415 COLL VENOUS BLD VENIPUNCTURE: CPT | Performed by: EMERGENCY MEDICINE

## 2022-12-30 PROCEDURE — 80053 COMPREHEN METABOLIC PANEL: CPT | Performed by: PHYSICIAN ASSISTANT

## 2022-12-30 PROCEDURE — 87389 HIV-1 AG W/HIV-1&-2 AB AG IA: CPT | Performed by: EMERGENCY MEDICINE

## 2022-12-30 PROCEDURE — 86803 HEPATITIS C AB TEST: CPT | Performed by: EMERGENCY MEDICINE

## 2022-12-30 PROCEDURE — 99283 EMERGENCY DEPT VISIT LOW MDM: CPT

## 2022-12-30 PROCEDURE — 83690 ASSAY OF LIPASE: CPT | Performed by: PHYSICIAN ASSISTANT

## 2022-12-30 PROCEDURE — 81003 URINALYSIS AUTO W/O SCOPE: CPT | Performed by: PHYSICIAN ASSISTANT

## 2022-12-30 NOTE — FIRST PROVIDER EVALUATION
Emergency Department TeleTriage Encounter Note      CHIEF COMPLAINT    Chief Complaint   Patient presents with    Fatigue     X 3 days    Rectal Bleeding     Dark red/maroon colored blood in stool today, denies abdominal pain or N/V/D, c/o decreased appetite       VITAL SIGNS   Initial Vitals [12/30/22 1744]   BP Pulse Resp Temp SpO2   (!) 141/76 68 17 98.3 °F (36.8 °C) 99 %      MAP       --            ALLERGIES    Review of patient's allergies indicates:   Allergen Reactions    Amoxicillin Diarrhea    Pollen, micronized Other (See Comments)       PROVIDER TRIAGE NOTE  Patient presents with generalized fatigue and malaise and dark red stool today. Reports intermittent diarrhea.       ORDERS  Labs Reviewed   HIV 1 / 2 ANTIBODY   HEPATITIS C ANTIBODY       ED Orders (720h ago, onward)      Start Ordered     Status Ordering Provider    12/30/22 1749 12/30/22 1748  HIV 1/2 Ag/Ab (4th Gen)  STAT         Pending Collection BOUCHRA IBARRA    12/30/22 1749 12/30/22 1748  Hepatitis C Antibody  STAT         Pending Collection BOUCHRA IBARRA              Virtual Visit Note: The provider triage portion of this emergency department evaluation and documentation was performed via [employer], a HIPAA-compliant telemedicine application, in concert with a tele-presenter in the room. A face to face patient evaluation with one of my colleagues will occur once the patient is placed in an emergency department room.      DISCLAIMER: This note was prepared with [employer] voice recognition transcription software. Garbled syntax, mangled pronouns, and other bizarre constructions may be attributed to that software system.

## 2022-12-31 LAB
HCV AB SERPL QL IA: NORMAL
HIV 1+2 AB+HIV1 P24 AG SERPL QL IA: NORMAL

## 2022-12-31 NOTE — ED PROVIDER NOTES
Encounter Date: 12/30/2022       History     Chief Complaint   Patient presents with    Fatigue     X 3 days    Rectal Bleeding     Dark red/maroon colored blood in stool today, denies abdominal pain or N/V/D, c/o decreased appetite     67-year-old male presents the emergency room for evaluation of blood in his stool today.  It was dark maroon and covered brown stool.  He has no rectal discomfort abdominal pain nausea vomiting diarrhea lightheadedness chest pain shortness of breath.  He is not on any blood thinners.  He does have a history of polyps that were resected.  He has no history of colon cancer.  He was a former smoker.  He denies any weight loss, trauma, palpable external hemorrhoids.  He states he has been slightly constipated.      Over the past week he had cough cold congestion runny nose sore throat body aches but this is improved.  He had decreased appetite associated with this but his appetite has returned normal.    Review of patient's allergies indicates:   Allergen Reactions    Amoxicillin Diarrhea    Pollen, micronized Other (See Comments)     Past Medical History:   Diagnosis Date    Asthma     Personal history of colonic polyps 07/11/2018     History reviewed. No pertinent surgical history.  Family History   Problem Relation Age of Onset    Cancer Mother     Diabetes Father      Social History     Tobacco Use    Smoking status: Former    Smokeless tobacco: Never     Review of Systems   Constitutional:  Positive for appetite change. Negative for chills, diaphoresis, fatigue and fever.   HENT:  Positive for congestion and rhinorrhea. Negative for ear discharge, ear pain and sore throat.    Eyes:  Negative for pain and visual disturbance.   Respiratory:  Positive for cough. Negative for shortness of breath.    Cardiovascular:  Negative for chest pain.   Gastrointestinal:  Positive for blood in stool. Negative for abdominal pain, diarrhea, nausea and vomiting.   Genitourinary:  Negative for  difficulty urinating.   Musculoskeletal:  Negative for arthralgias.   Skin:  Negative for rash.   Neurological:  Negative for weakness, numbness and headaches.   All other systems reviewed and are negative.    Physical Exam     Initial Vitals [12/30/22 1744]   BP Pulse Resp Temp SpO2   (!) 141/76 68 17 98.3 °F (36.8 °C) 99 %      MAP       --         Physical Exam    Nursing note and vitals reviewed.  Constitutional: He appears well-developed.   HENT:   Head: Normocephalic and atraumatic.   Mouth/Throat: Oropharynx is clear and moist.   Eyes: EOM are normal. Pupils are equal, round, and reactive to light.   Neck: Neck supple.   Cardiovascular:  Normal rate, regular rhythm, normal heart sounds and intact distal pulses.           Pulmonary/Chest: Breath sounds normal. He has no wheezes. He has no rhonchi. He has no rales.   Abdominal: Abdomen is soft. Bowel sounds are normal. There is no abdominal tenderness.   Genitourinary:    Genitourinary Comments: No external hemorrhoids.  No fistula.  No anal fissures.     Musculoskeletal:         General: Normal range of motion.      Cervical back: Neck supple.     Neurological: He is alert and oriented to person, place, and time. He has normal strength. No sensory deficit.   Skin: Skin is warm and dry.   Psychiatric: He has a normal mood and affect.       ED Course   Procedures  Labs Reviewed   CBC W/ AUTO DIFFERENTIAL - Abnormal; Notable for the following components:       Result Value    MCHC 31.9 (*)     All other components within normal limits    Narrative:     Release to patient->Immediate   URINALYSIS, REFLEX TO URINE CULTURE - Abnormal; Notable for the following components:    Occult Blood UA Trace (*)     All other components within normal limits    Narrative:     Specimen Source->Urine   COMPREHENSIVE METABOLIC PANEL    Narrative:     Release to patient->Immediate   LIPASE    Narrative:     Release to patient->Immediate   HIV 1 / 2 ANTIBODY   HEPATITIS C ANTIBODY    OCCULT BLOOD X 1, STOOL          Imaging Results    None          Medications - No data to display  Medical Decision Making:   Patient likely had a viral syndrome causing his cough cold congestion runny nose body aches.  Given that he is improved at this time is stable vital signs aside from hypertension I do not think he needs viral testing.  In regards to his 1 episode of maroon stools it was on the toilet paper and small drops in the toilet.  It may be an internal hemorrhoid.  I do not appreciate any external hemorrhoids.  He has no abdominal tenderness.  His H&H is stable.  He is not on blood thinners and he has normal platelets.  I do feel that he is stable for discharge at this time without further imaging and can follow up with Gastroenterology for outpatient colonoscopy.  I placed a referral.  I have given return precautions he is stable for discharge.                        Clinical Impression:   Final diagnoses:  [K92.1] Blood in stool (Primary)        ED Disposition Condition    Discharge Stable          ED Prescriptions    None       Follow-up Information       Follow up With Specialties Details Why Contact Info    Joaquim Hawkins MD Gastroenterology Schedule an appointment as soon as possible for a visit   [address]  [address]  [place] [address]  [phone]               Erick Edgar MD  12/30/22 2048

## 2022-12-31 NOTE — ED NOTES
"Pt placed in gown with warm blankets for comfort.  Pt states that he has not been feeling well and noticed dark blood in his stool. States his pain is "all over in my muscles."  Rated pain 6 on scale of 0 to 10.  Noticed the blood in his stool today but not sure when it actually started.  "

## 2022-12-31 NOTE — DISCHARGE INSTRUCTIONS
Take stool softeners.  You may have an internal hemorrhoid causing bleeding.  However you really need to follow up with a gastroenterologist to get a colonoscopy to evaluate further causes of bleeding.  If you have abdominal pain fevers persistently bloody stools lightheadedness passing out chest pain shortness breath or any other concerns return emergency room.

## 2023-01-06 ENCOUNTER — TELEPHONE (OUTPATIENT)
Dept: GASTROENTEROLOGY | Facility: CLINIC | Age: 68
End: 2023-01-06
Payer: MEDICARE

## 2023-01-06 NOTE — TELEPHONE ENCOUNTER
----- Message from Roxi Bonilla sent at 1/6/2023  7:24 AM CST -----  Regarding: appointment  Contact: patient  Patient want to speak with a nurse regarding rescheduling appointment to a later date, patient was unable to make appointment today, please call back at [phone] (home) no availability

## 2023-01-06 NOTE — TELEPHONE ENCOUNTER
Call placed to Mr. Chan in regards to message received. No answer, left message to return call.     Made appt for next available with LARS Farias on 1/18 at 0830.

## 2023-01-06 NOTE — TELEPHONE ENCOUNTER
----- Message from Иван Finley sent at 1/6/2023 12:27 PM CST -----  Contact: pt at [phone]  Type:  Patient Returning Call    Who Called:  pt  Who Left Message for Patient:  Kay  Does the patient know what this is regarding?:  yes  Best Call Back Number:  [phone]  Additional Information:  Please call back to advise.

## 2023-01-18 ENCOUNTER — OFFICE VISIT (OUTPATIENT)
Dept: GASTROENTEROLOGY | Facility: CLINIC | Age: 68
End: 2023-01-18
Payer: MEDICARE

## 2023-01-18 VITALS
HEART RATE: 69 BPM | SYSTOLIC BLOOD PRESSURE: 115 MMHG | BODY MASS INDEX: 25.73 KG/M2 | WEIGHT: 173.75 LBS | HEIGHT: 69 IN | DIASTOLIC BLOOD PRESSURE: 75 MMHG

## 2023-01-18 DIAGNOSIS — Z86.010 HISTORY OF COLON POLYPS: ICD-10-CM

## 2023-01-18 DIAGNOSIS — R19.7 INTERMITTENT DIARRHEA: ICD-10-CM

## 2023-01-18 DIAGNOSIS — R53.82 CHRONIC FATIGUE: ICD-10-CM

## 2023-01-18 DIAGNOSIS — K92.1 HEMATOCHEZIA: ICD-10-CM

## 2023-01-18 DIAGNOSIS — Z09 HOSPITAL DISCHARGE FOLLOW-UP: Primary | ICD-10-CM

## 2023-01-18 DIAGNOSIS — K64.9 HEMORRHOIDS, UNSPECIFIED HEMORRHOID TYPE: ICD-10-CM

## 2023-01-18 PROCEDURE — 99203 OFFICE O/P NEW LOW 30 MIN: CPT | Mod: S$PBB,,,

## 2023-01-18 PROCEDURE — 99214 OFFICE O/P EST MOD 30 MIN: CPT | Mod: PBBFAC,PN

## 2023-01-18 PROCEDURE — 99999 PR PBB SHADOW E&M-EST. PATIENT-LVL IV: CPT | Mod: PBBFAC,,,

## 2023-01-18 PROCEDURE — 99999 PR PBB SHADOW E&M-EST. PATIENT-LVL IV: ICD-10-PCS | Mod: PBBFAC,,,

## 2023-01-18 PROCEDURE — 99203 PR OFFICE/OUTPT VISIT, NEW, LEVL III, 30-44 MIN: ICD-10-PCS | Mod: S$PBB,,,

## 2023-01-18 NOTE — PROGRESS NOTES
"Subjective:       Patient ID: Dustin Owens is a 67 y.o. male Body mass index is 25.65 kg/m².    Chief Complaint: Hospital Follow Up    This patient is new to me.  Referring Provider: Dr. Jimbo Howe for blood in stool.      Reviewed hospital ER report from 12/30/2022,  "Medical Decision Making: Patient likely had a viral syndrome causing his cough cold congestion runny nose body aches.  Given that he is improved at this time is stable vital signs aside from hypertension I do not think he needs viral testing.  In regards to his 1 episode of maroon stools it was on the toilet paper and small drops in the toilet.  It may be an internal hemorrhoid.  I do not appreciate any external hemorrhoids.  He has no abdominal tenderness.  His H&H is stable.  He is not on blood thinners and he has normal platelets.  I do feel that he is stable for discharge at this time without further imaging and can follow up with Gastroenterology for outpatient colonoscopy.  I placed a referral.  I have given return precautions he is stable for discharge."    GI Problem  The primary symptoms include fatigue (Reports he sometimes does not get good sleep and works nights), diarrhea and hematochezia. Primary symptoms do not include fever, weight loss, abdominal pain, nausea, vomiting, melena, hematemesis, jaundice or dysuria.   The diarrhea began more than 1 week ago (Reports chronic intermittent diarrhea, that is typically diet dependent especially after drinking milk; occurs about 2 times a week). The diarrhea is semi-solid and watery (Rated stool 5 on Amite scale typically, but 7 when diarrhea is present). The diarrhea occurs once per day. Risk factors: denies recent antibiotics, new medications, suspect food, or travel.   The hematochezia began more than 1 week ago (Occurred 3 weeks ago which initiated ER visit on 12/30/2022; H/H 14.6/45.8 on 12/30/2022). Frequency: Reports 2 epsiodes prior to ER visit; small amount maroon blood " "from rectum in stool, in toilet bowl, & on tissue paper with BMs; denies between between BMs or rectal pain; denies bleeding since ER visit. The hematochezia is a new problem.   The illness does not include chills, anorexia, dysphagia, odynophagia, bloating or constipation. Associated symptoms comments: Colonoscopy by Dr. More 07/11/2018 - "One 3 millimeter polyp in the descending colon, removed with a cold snare.  Resected and retrieved. One 6 millimeter polyp in the sigmoid colon, removed with a hot snare.  Resected and retrieved.  Nonbleeding internal hemorrhoids.  The examination was otherwise normal"; patho:  Benign polypoid colonic mucosa, negative for adenoma.  Hyperplastic polyp.. Significant associated medical issues include hemorrhoids (Internal). Associated medical issues do not include inflammatory bowel disease, GERD, gallstones, liver disease, alcohol abuse, PUD, gastric bypass, bowel resection, irritable bowel syndrome or diverticulitis.     Review of Systems   Constitutional:  Positive for fatigue (Reports he sometimes does not get good sleep and works nights). Negative for activity change, appetite change, chills, diaphoresis, fever, unexpected weight change and weight loss.   HENT:  Negative for sore throat and trouble swallowing.    Respiratory:  Negative for cough, choking and shortness of breath.    Cardiovascular:  Negative for chest pain.   Gastrointestinal:  Positive for anal bleeding, blood in stool, diarrhea and hematochezia. Negative for abdominal distention, abdominal pain, anorexia, bloating, constipation, dysphagia, hematemesis, jaundice, melena, nausea, rectal pain and vomiting.   Genitourinary:  Negative for dysuria.       No LMP for male patient.  Past Medical History:   Diagnosis Date    Asthma     Colon polyp     Personal history of colonic polyps 07/11/2018     Past Surgical History:   Procedure Laterality Date    COLONOSCOPY       Family History   Problem Relation Age of " Onset    Cancer Mother     Diabetes Father     Colon cancer Neg Hx     Colon polyps Neg Hx     Crohn's disease Neg Hx     Ulcerative colitis Neg Hx     Esophageal cancer Neg Hx     Stomach cancer Neg Hx      Social History     Tobacco Use    Smoking status: Former    Smokeless tobacco: Never   Substance Use Topics    Alcohol use: Never     Wt Readings from Last 10 Encounters:   01/18/23 78.8 kg (173 lb 11.6 oz)   12/30/22 77.1 kg (170 lb)   09/22/22 77.4 kg (170 lb 9.6 oz)   09/02/22 78.4 kg (172 lb 13.5 oz)   08/22/22 75.8 kg (167 lb)   07/07/22 75.8 kg (167 lb 3.2 oz)   06/29/22 76 kg (167 lb 8.8 oz)   06/28/22 77.5 kg (170 lb 13.7 oz)   06/15/22 80.3 kg (177 lb)   05/31/22 80.3 kg (177 lb)     Lab Results   Component Value Date    WBC 5.06 12/30/2022    HGB 14.6 12/30/2022    HCT 45.8 12/30/2022    MCV 88 12/30/2022     12/30/2022     CMP  Sodium   Date Value Ref Range Status   12/30/2022 140 136 - 145 mmol/L Final     Potassium   Date Value Ref Range Status   12/30/2022 4.1 3.5 - 5.1 mmol/L Final     Chloride   Date Value Ref Range Status   12/30/2022 106 95 - 110 mmol/L Final     CO2   Date Value Ref Range Status   12/30/2022 26 23 - 29 mmol/L Final     Glucose   Date Value Ref Range Status   12/30/2022 91 70 - 110 mg/dL Final     BUN   Date Value Ref Range Status   12/30/2022 10 8 - 23 mg/dL Final     Creatinine   Date Value Ref Range Status   12/30/2022 1.2 0.5 - 1.4 mg/dL Final     Calcium   Date Value Ref Range Status   12/30/2022 9.1 8.7 - 10.5 mg/dL Final     Total Protein   Date Value Ref Range Status   12/30/2022 7.1 6.0 - 8.4 g/dL Final     Albumin   Date Value Ref Range Status   12/30/2022 4.0 3.5 - 5.2 g/dL Final     Total Bilirubin   Date Value Ref Range Status   12/30/2022 0.7 0.1 - 1.0 mg/dL Final     Comment:     For infants and newborns, interpretation of results should be based  on gestational age, weight and in agreement with clinical  observations.    Premature Infant recommended  reference ranges:  Up to 24 hours.............<8.0 mg/dL  Up to 48 hours............<12.0 mg/dL  3-5 days..................<15.0 mg/dL  6-29 days.................<15.0 mg/dL       Alkaline Phosphatase   Date Value Ref Range Status   12/30/2022 83 55 - 135 U/L Final     AST   Date Value Ref Range Status   12/30/2022 29 10 - 40 U/L Final     ALT   Date Value Ref Range Status   12/30/2022 36 10 - 44 U/L Final     Anion Gap   Date Value Ref Range Status   12/30/2022 8 8 - 16 mmol/L Final     eGFR if    Date Value Ref Range Status   06/17/2022 >60.0 >60 mL/min/1.73 m^2 Final     eGFR if non    Date Value Ref Range Status   06/17/2022 57.7 (A) >60 mL/min/1.73 m^2 Final     Comment:     Calculation used to obtain the estimated glomerular filtration  rate (eGFR) is the CKD-EPI equation.        Lab Results   Component Value Date    LIPASE 26 12/30/2022     Lab Results   Component Value Date    TSH 1.92 08/12/2008     Reviewed prior medical records including endoscopy history (see surgical history).    Objective:      Physical Exam  Vitals and nursing note reviewed.   Constitutional:       General: He is not in acute distress.     Appearance: Normal appearance. He is not ill-appearing.   HENT:      Mouth/Throat:      Lips: Pink. No lesions.   Cardiovascular:      Rate and Rhythm: Normal rate and regular rhythm.      Pulses: Normal pulses.   Pulmonary:      Effort: Pulmonary effort is normal. No respiratory distress.      Breath sounds: Normal breath sounds.   Abdominal:      General: Abdomen is flat. Bowel sounds are normal. There is no distension or abdominal bruit. There are no signs of injury.      Palpations: Abdomen is soft. There is no shifting dullness, fluid wave, hepatomegaly, splenomegaly or mass.      Tenderness: There is no abdominal tenderness. There is no guarding or rebound. Negative signs include Laird's sign, Rovsing's sign and McBurney's sign.      Hernia: No hernia is  present.   Skin:     General: Skin is warm and dry.      Coloration: Skin is not jaundiced or pale.   Neurological:      Mental Status: He is alert and oriented to person, place, and time.   Psychiatric:         Attention and Perception: Attention normal.         Mood and Affect: Mood normal.         Speech: Speech normal.         Behavior: Behavior normal.       Assessment:       1. Hospital discharge follow-up    2. Hematochezia    3. History of colon polyps    4. Intermittent diarrhea    5. Hemorrhoids, unspecified hemorrhoid type    6. Chronic fatigue        Plan:       Hospital discharge follow-up    Hematochezia  - schedule Colonoscopy, discussed procedure with the patient, including risks and benefits, patient verbalized understanding  - discussed about different etiologies that can cause rectal bleeding, such as but not limited to diverticulosis, polyps, colon mass, colon inflammation or infection, anal fissure or hemorrhoids.   - You may resume normal activity as long as you feel well.  - Avoid/minimize NSAIDs such as ibuprofen (Advil, Motrin) and naproxen (Aleve and Naprosyn).  - Avoid alcohol.  -     Case Request Endoscopy: COLONOSCOPY    History of colon polyps  - schedule Colonoscopy, discussed procedure with the patient, including risks and benefits, patient verbalized understanding  -     Case Request Endoscopy: COLONOSCOPY    Intermittent diarrhea  - schedule Colonoscopy, discussed procedure with the patient, including risks and benefits, patient verbalized understanding  - Recommended increase fiber in diet, especially soluble fiber since this can help bulk up the stool consistency and may help to slow down how fast the stool goes through the colon and can prevent diarrhea  - avoid lactose, alcohol, & caffeine  - avoid known triggers  -if diarrhea becomes persistent and watery, consider stool studies  -recommend food diary to identify possible food triggers  -     Case Request Endoscopy:  COLONOSCOPY    Hemorrhoids, unspecified hemorrhoid type  - schedule Colonoscopy, discussed procedure with the patient, including risks and benefits, patient verbalized understanding  -     Case Request Endoscopy: COLONOSCOPY    Chronic fatigue  -follow-up with PCP for continued evaluation and management    Follow up in about 4 weeks (around 2/15/2023), or if symptoms worsen or fail to improve.      If no improvement in symptoms or symptoms worsen, call/follow-up at clinic or go to ER.        30 minutes of total time spent on the encounter, which includes face to face time and non-face to face time preparing to see the patient (eg, review of tests), Obtaining and/or reviewing separately obtained history, Documenting clinical information in the electronic or other health record, Independently interpreting results (not separately reported) and communicating results to the patient/family/caregiver, or Care coordination (not separately reported).     A dictation software program was used for this note. Please expect some simple typographical  errors in this note.

## 2023-02-10 ENCOUNTER — OFFICE VISIT (OUTPATIENT)
Dept: URGENT CARE | Facility: CLINIC | Age: 68
End: 2023-02-10
Payer: MEDICARE

## 2023-02-10 VITALS
RESPIRATION RATE: 18 BRPM | OXYGEN SATURATION: 99 % | HEIGHT: 69 IN | TEMPERATURE: 98 F | DIASTOLIC BLOOD PRESSURE: 80 MMHG | WEIGHT: 173.81 LBS | SYSTOLIC BLOOD PRESSURE: 138 MMHG | BODY MASS INDEX: 25.74 KG/M2

## 2023-02-10 DIAGNOSIS — B96.89 ACUTE BACTERIAL SINUSITIS: Primary | ICD-10-CM

## 2023-02-10 DIAGNOSIS — J01.90 ACUTE BACTERIAL SINUSITIS: Primary | ICD-10-CM

## 2023-02-10 PROCEDURE — 99213 PR OFFICE/OUTPT VISIT, EST, LEVL III, 20-29 MIN: ICD-10-PCS | Mod: 25,S$GLB,, | Performed by: NURSE PRACTITIONER

## 2023-02-10 PROCEDURE — 96372 THER/PROPH/DIAG INJ SC/IM: CPT | Mod: S$GLB,,, | Performed by: NURSE PRACTITIONER

## 2023-02-10 PROCEDURE — 96372 PR INJECTION,THERAP/PROPH/DIAG2ST, IM OR SUBCUT: ICD-10-PCS | Mod: S$GLB,,, | Performed by: NURSE PRACTITIONER

## 2023-02-10 PROCEDURE — 99213 OFFICE O/P EST LOW 20 MIN: CPT | Mod: 25,S$GLB,, | Performed by: NURSE PRACTITIONER

## 2023-02-10 RX ORDER — CEFDINIR 300 MG/1
300 CAPSULE ORAL 2 TIMES DAILY
Qty: 20 CAPSULE | Refills: 0 | Status: SHIPPED | OUTPATIENT
Start: 2023-02-10 | End: 2023-02-20

## 2023-02-10 RX ORDER — AZELASTINE 1 MG/ML
1 SPRAY, METERED NASAL 2 TIMES DAILY
Qty: 30 ML | Refills: 0 | Status: SHIPPED | OUTPATIENT
Start: 2023-02-10 | End: 2023-06-08

## 2023-02-10 RX ORDER — DEXAMETHASONE SODIUM PHOSPHATE 4 MG/ML
4 INJECTION, SOLUTION INTRA-ARTICULAR; INTRALESIONAL; INTRAMUSCULAR; INTRAVENOUS; SOFT TISSUE
Status: COMPLETED | OUTPATIENT
Start: 2023-02-10 | End: 2023-02-10

## 2023-02-10 RX ADMIN — DEXAMETHASONE SODIUM PHOSPHATE 4 MG: 4 INJECTION, SOLUTION INTRA-ARTICULAR; INTRALESIONAL; INTRAMUSCULAR; INTRAVENOUS; SOFT TISSUE at 02:02

## 2023-02-10 NOTE — PROGRESS NOTES
"Subjective:       Patient ID: Dustin Owens is a 67 y.o. male.    Vitals:  height is 5' 9" (1.753 m) and weight is 78.8 kg (173 lb 12.8 oz). His temperature is 98.3 °F (36.8 °C). His blood pressure is 138/80. His respiration is 18 and oxygen saturation is 99%.     Chief Complaint: Headache    Patient has had sinus congestion and pressure for 10 days, not improving. He has been using flonase and OTc medication with little relief. Last sinus infection was about a year ago.    Headache   This is a new problem. The current episode started 1 to 4 weeks ago (x's 10 days). The problem has been gradually worsening. Associated symptoms include ear pain, eye pain, neck pain and sinus pressure. Pertinent negatives include no coughing, fever or sore throat. Treatments tried: flonase. The treatment provided no relief.     Constitution: Positive for fatigue. Negative for chills, sweating and fever.   HENT:  Positive for ear pain, congestion, sinus pain and sinus pressure. Negative for sore throat.    Neck: Positive for neck pain.   Eyes:  Positive for eye pain.   Respiratory: Negative.  Negative for cough and sputum production.    Gastrointestinal: Negative.    Neurological:  Positive for headaches.     Objective:      Physical Exam   Constitutional: He is oriented to person, place, and time. No distress.   HENT:   Head: Normocephalic and atraumatic.   Ears:   Right Ear: Tympanic membrane normal. No middle ear effusion.   Left Ear: Tympanic membrane normal.  No middle ear effusion.   Nose: Mucosal edema present. Right sinus exhibits maxillary sinus tenderness and frontal sinus tenderness. Left sinus exhibits maxillary sinus tenderness and frontal sinus tenderness.   Cardiovascular: Normal rate and regular rhythm.   No murmur heard.  Pulmonary/Chest: Effort normal and breath sounds normal. No respiratory distress.   Abdominal: Normal appearance.   Neurological: He is alert and oriented to person, place, and time. "   Psychiatric: His behavior is normal. Mood normal.       Assessment:       1. Acute bacterial sinusitis          Plan:       Follow up with PCP if symptoms are not resolving in 48-72 hours, follow up immediately for new or worsening symptoms, ED precautions discussed.    Acute bacterial sinusitis  -     dexAMETHasone injection 4 mg  -     cefdinir (OMNICEF) 300 MG capsule; Take 1 capsule (300 mg total) by mouth 2 (two) times daily. for 10 days  Dispense: 20 capsule; Refill: 0  -     azelastine (ASTELIN) 137 mcg (0.1 %) nasal spray; 1 spray (137 mcg total) by Nasal route 2 (two) times daily.  Dispense: 30 mL; Refill: 0

## 2023-03-24 ENCOUNTER — TELEPHONE (OUTPATIENT)
Dept: GASTROENTEROLOGY | Facility: CLINIC | Age: 68
End: 2023-03-24
Payer: MEDICARE

## 2023-03-24 NOTE — TELEPHONE ENCOUNTER
----- Message from Yelena Fernandez sent at 3/24/2023  8:42 AM CDT -----  Regarding: pt call back  Name of Who is Calling:CHANA ARMSTRONG [[id number]]          What is the request in detail: pt would like to know the time of the appointments           Can the clinic reply by [facility]: no           [facility] [phone] (home)

## 2023-03-26 ENCOUNTER — NURSE TRIAGE (OUTPATIENT)
Dept: ADMINISTRATIVE | Facility: CLINIC | Age: 68
End: 2023-03-26
Payer: MEDICARE

## 2023-03-26 NOTE — TELEPHONE ENCOUNTER
Reason for Disposition   Health Information question, no triage required and triager able to answer question    Additional Information   Negative: [1] Caller is not with the adult (patient) AND [2] reporting urgent symptoms   Negative: Lab result questions   Negative: Medication questions   Negative: Caller can't be reached by phone   Negative: Caller has already spoken to PCP or another triager   Negative: RN needs further essential information from caller in order to complete triage   Negative: Requesting regular office appointment   Negative: [1] Caller requesting NON-URGENT health information AND [2] PCP's office is the best resource    Protocols used: Information Only Call - No Triage-A-  pt called re procedure nishant. not sure where to go nishant. Offered pt address for colonoscopy procedure. Offered pt website to view colonoscopy instructions as pt states his instructions disappeared from his my chart.

## 2023-03-27 ENCOUNTER — HOSPITAL ENCOUNTER (OUTPATIENT)
Facility: HOSPITAL | Age: 68
Discharge: HOME | End: 2023-03-27
Attending: INTERNAL MEDICINE | Admitting: STUDENT IN AN ORGANIZED HEALTH CARE EDUCATION/TRAINING PROGRAM
Payer: MEDICARE

## 2023-03-27 ENCOUNTER — ANESTHESIA (OUTPATIENT)
Dept: ENDOSCOPY | Facility: HOSPITAL | Age: 68
End: 2023-03-27
Payer: MEDICARE

## 2023-03-27 ENCOUNTER — ANESTHESIA EVENT (OUTPATIENT)
Dept: ENDOSCOPY | Facility: HOSPITAL | Age: 68
End: 2023-03-27
Payer: MEDICARE

## 2023-03-27 VITALS
RESPIRATION RATE: 18 BRPM | HEIGHT: 69 IN | OXYGEN SATURATION: 96 % | BODY MASS INDEX: 25.48 KG/M2 | TEMPERATURE: 98 F | WEIGHT: 172 LBS | HEART RATE: 70 BPM | SYSTOLIC BLOOD PRESSURE: 120 MMHG | DIASTOLIC BLOOD PRESSURE: 72 MMHG

## 2023-03-27 DIAGNOSIS — K92.1 HEMATOCHEZIA: ICD-10-CM

## 2023-03-27 PROCEDURE — D9220A PRA ANESTHESIA: ICD-10-PCS | Mod: CRNA,,, | Performed by: NURSE ANESTHETIST, CERTIFIED REGISTERED

## 2023-03-27 PROCEDURE — 37000009 HC ANESTHESIA EA ADD 15 MINS: Performed by: INTERNAL MEDICINE

## 2023-03-27 PROCEDURE — 88305 TISSUE EXAM BY PATHOLOGIST: ICD-10-PCS | Mod: 26,,, | Performed by: PATHOLOGY

## 2023-03-27 PROCEDURE — 45380 COLONOSCOPY AND BIOPSY: CPT | Mod: 59 | Performed by: INTERNAL MEDICINE

## 2023-03-27 PROCEDURE — D9220A PRA ANESTHESIA: Mod: ANES,,, | Performed by: ANESTHESIOLOGY

## 2023-03-27 PROCEDURE — 25000003 PHARM REV CODE 250: Performed by: NURSE ANESTHETIST, CERTIFIED REGISTERED

## 2023-03-27 PROCEDURE — 45385 COLONOSCOPY W/LESION REMOVAL: CPT | Mod: ,,, | Performed by: INTERNAL MEDICINE

## 2023-03-27 PROCEDURE — D9220A PRA ANESTHESIA: Mod: CRNA,,, | Performed by: NURSE ANESTHETIST, CERTIFIED REGISTERED

## 2023-03-27 PROCEDURE — 45380 COLONOSCOPY AND BIOPSY: CPT | Mod: 59,,, | Performed by: INTERNAL MEDICINE

## 2023-03-27 PROCEDURE — 37000008 HC ANESTHESIA 1ST 15 MINUTES: Performed by: INTERNAL MEDICINE

## 2023-03-27 PROCEDURE — 27201012 HC FORCEPS, HOT/COLD, DISP: Performed by: INTERNAL MEDICINE

## 2023-03-27 PROCEDURE — 25000003 PHARM REV CODE 250: Performed by: INTERNAL MEDICINE

## 2023-03-27 PROCEDURE — 45385 COLONOSCOPY W/LESION REMOVAL: CPT | Performed by: INTERNAL MEDICINE

## 2023-03-27 PROCEDURE — 88305 TISSUE EXAM BY PATHOLOGIST: CPT | Mod: 26,,, | Performed by: PATHOLOGY

## 2023-03-27 PROCEDURE — 88305 TISSUE EXAM BY PATHOLOGIST: CPT | Performed by: PATHOLOGY

## 2023-03-27 PROCEDURE — 45380 PR COLONOSCOPY,BIOPSY: ICD-10-PCS | Mod: 59,,, | Performed by: INTERNAL MEDICINE

## 2023-03-27 PROCEDURE — 63600175 PHARM REV CODE 636 W HCPCS: Performed by: NURSE ANESTHETIST, CERTIFIED REGISTERED

## 2023-03-27 PROCEDURE — 45385 PR COLONOSCOPY,REMV LESN,SNARE: ICD-10-PCS | Mod: ,,, | Performed by: INTERNAL MEDICINE

## 2023-03-27 PROCEDURE — D9220A PRA ANESTHESIA: ICD-10-PCS | Mod: ANES,,, | Performed by: ANESTHESIOLOGY

## 2023-03-27 PROCEDURE — 27201089 HC SNARE, DISP (ANY): Performed by: INTERNAL MEDICINE

## 2023-03-27 RX ORDER — LIDOCAINE HYDROCHLORIDE 20 MG/ML
INJECTION INTRAVENOUS
Status: DISCONTINUED | OUTPATIENT
Start: 2023-03-27 | End: 2023-03-27

## 2023-03-27 RX ORDER — PROPOFOL 10 MG/ML
VIAL (ML) INTRAVENOUS
Status: DISCONTINUED | OUTPATIENT
Start: 2023-03-27 | End: 2023-03-27

## 2023-03-27 RX ORDER — SODIUM CHLORIDE 9 MG/ML
INJECTION, SOLUTION INTRAVENOUS CONTINUOUS
Status: DISCONTINUED | OUTPATIENT
Start: 2023-03-27 | End: 2023-03-27 | Stop reason: HOSPADM

## 2023-03-27 RX ADMIN — PROPOFOL 50 MG: 10 INJECTION, EMULSION INTRAVENOUS at 11:03

## 2023-03-27 RX ADMIN — LIDOCAINE HYDROCHLORIDE 100 MG: 20 INJECTION, SOLUTION INTRAVENOUS at 11:03

## 2023-03-27 RX ADMIN — PROPOFOL 100 MG: 10 INJECTION, EMULSION INTRAVENOUS at 11:03

## 2023-03-27 RX ADMIN — SODIUM CHLORIDE: 9 INJECTION, SOLUTION INTRAVENOUS at 10:03

## 2023-03-27 NOTE — ANESTHESIA POSTPROCEDURE EVALUATION
Anesthesia Post Evaluation    Patient: Dustin Owens    Procedure(s) Performed: Procedure(s) (LRB):  COLONOSCOPY (N/A)    Final Anesthesia Type: general      Patient location during evaluation: PACU  Patient participation: Yes- Able to Participate  Level of consciousness: awake and alert  Post-procedure vital signs: reviewed and stable  Pain management: adequate  Airway patency: patent    PONV status at discharge: No PONV  Anesthetic complications: no      Cardiovascular status: blood pressure returned to baseline  Respiratory status: unassisted  Hydration status: euvolemic  Follow-up not needed.          Vitals Value Taken Time   /67 03/27/23 1214   Temp 36.6 °C (97.9 °F) 03/27/23 1205   Pulse 70 03/27/23 1217   Resp 18   03/27/23 1217   SpO2 96 % 03/27/23 1217   Vitals shown include unvalidated device data.      No case tracking events are documented in the log.      Pain/Trent Score: Trent Score: 8 (3/27/2023 12:05 PM)

## 2023-03-27 NOTE — PROVATION PATIENT INSTRUCTIONS
Discharge Summary/Instructions after an Endoscopic Procedure  Patient Name: Dustin Owens  Patient MRN: [id number]  Patient YOB: 1955 Monday, March 27, 2023  Arjun Ramos MD  Dear patient,  As a result of recent federal legislation (The Federal Cures Act), you may   receive lab or pathology results from your procedure in your [facility]   account before your physician is able to contact you. Your physician or   their representative will relay the results to you with their   recommendations at their soonest availability.  Thank you,  RESTRICTIONS:  During your procedure today, you received medications for sedation.  These   medications may affect your judgment, balance and coordination.  Therefore,   for 24 hours, you have the following restrictions:   - DO NOT drive a car, operate machinery, make legal/financial decisions,   sign important papers or drink alcohol.    ACTIVITY:  Today: no heavy lifting, straining or running due to procedural   sedation/anesthesia.  The following day: return to full activity including work.  DIET:  Eat and drink normally unless instructed otherwise.     TREATMENT FOR COMMON SIDE EFFECTS:  - Mild abdominal pain, nausea, belching, bloating or excessive gas:  rest,   eat lightly and use a heating pad.  - Sore Throat: treat with throat lozenges and/or gargle with warm salt   water.  - Because air was used during the procedure, expelling large amounts of air   from your rectum or belching is normal.  - If a bowel prep was taken, you may not have a bowel movement for 1-3 days.    This is normal.  SYMPTOMS TO WATCH FOR AND REPORT TO YOUR PHYSICIAN:  1. Abdominal pain or bloating, other than gas cramps.  2. Chest pain.  3. Back pain.  4. Signs of infection such as: chills or fever occurring within 24 hours   after the procedure.  5. Rectal bleeding, which would show as bright red, maroon, or black stools.   (A tablespoon of blood from the rectum is not serious, especially  if   hemorrhoids are present.)  6. Vomiting.  7. Weakness or dizziness.  GO DIRECTLY TO THE NEAREST EMERGENCY ROOM IF YOU HAVE ANY OF THE FOLLOWING:      Difficulty breathing              Chills and/or fever over 101 F   Persistent vomiting and/or vomiting blood   Severe abdominal pain   Severe chest pain   Black, tarry stools   Bleeding- more than one tablespoon   Any other symptom or condition that you feel may need urgent attention  Your doctor recommends these additional instructions:  If any biopsies were taken, your doctors clinic will contact you in 1 to 2   weeks with any results.  - Discharge patient to home.   - Advance diet as tolerated.   - Continue present medications.   - Await pathology results.   - Repeat colonoscopy in 5 years for surveillance.   - Written discharge instructions were provided to the patient.  For questions, problems or results please call your physician - Arjun Ramos MD at Work:  [phone].  [facility], EMERGENCY ROOM PHONE NUMBER: [phone]  IF A COMPLICATION OR EMERGENCY SITUATION ARISES AND YOU ARE UNABLE TO REACH   YOUR PHYSICIAN - GO DIRECTLY TO THE EMERGENCY ROOM.  Arjun Ramos MD  3/27/2023 12:03:31 PM  This report has been verified and signed electronically.  Dear patient,  As a result of recent federal legislation (The Federal Cures Act), you may   receive lab or pathology results from your procedure in your [facility]   account before your physician is able to contact you. Your physician or   their representative will relay the results to you with their   recommendations at their soonest availability.  Thank you,  PROVATION

## 2023-03-27 NOTE — H&P
History & Physical - Short Stay  Gastroenterology      SUBJECTIVE:     Procedure: Colonoscopy    Chief Complaint/Indication for Procedure: Hematochezia    Facility-Administered Medications Prior to Admission   Medication    LIDOcaine HCL 10 mg/ml (1%) injection 3 mL     PTA Medications   Medication Sig    albuterol (VENTOLIN HFA) 90 mcg/actuation inhaler Inhale 2 puffs into the lungs every 6 (six) hours as needed for Wheezing. Rescue    atorvastatin (LIPITOR) 10 MG tablet Take 10 mg by mouth once daily.    azelastine (ASTELIN) 137 mcg (0.1 %) nasal spray 1 spray (137 mcg total) by Nasal route 2 (two) times daily.    cetirizine (ZYRTEC) 10 MG tablet Take 1 tablet (10 mg total) by mouth once daily.    diclofenac sodium (VOLTAREN) 1 % Gel Apply 4 g topically 4 (four) times daily.    EScitalopram oxalate (LEXAPRO) 20 MG tablet Take 1 tablet (20 mg total) by mouth once daily.    fluticasone propionate (FLONASE) 50 mcg/actuation nasal spray 1 spray (50 mcg total) by Each Nostril route once daily.    gabapentin (NEURONTIN) 800 MG tablet Take 1 tablet (800 mg total) by mouth 3 (three) times daily.    sildenafiL (VIAGRA) 50 MG tablet Take 1 tablet (50 mg total) by mouth daily as needed for Erectile Dysfunction.    traZODone (DESYREL) 50 MG tablet Take 1 tablet (50 mg total) by mouth every evening.       Review of patient's allergies indicates:   Allergen Reactions    Amoxicillin Diarrhea    Pollen, micronized Other (See Comments)        Past Medical History:   Diagnosis Date    Asthma     Colon polyp     Personal history of colonic polyps 07/11/2018     Past Surgical History:   Procedure Laterality Date    COLONOSCOPY       Family History   Problem Relation Age of Onset    Cancer Mother     Diabetes Father     Colon cancer Neg Hx     Colon polyps Neg Hx     Crohn's disease Neg Hx     Ulcerative colitis Neg Hx     Esophageal cancer Neg Hx     Stomach cancer Neg Hx      Social History     Tobacco Use    Smoking status: Former     Smokeless tobacco: Never   Substance Use Topics    Alcohol use: Not Currently         OBJECTIVE:     Vital Signs (Most Recent)  Temp: 98.6 °F (37 °C) (03/27/23 1006)  Pulse: 76 (03/27/23 1006)  Resp: 18 (03/27/23 1006)  BP: 136/74 (03/27/23 1006)  SpO2: 98 % (03/27/23 1006)    Physical Exam:                                                       GENERAL:  Comfortable, in no acute distress.                                 HEENT EXAM:  Nonicteric.  No adenopathy.  Oropharynx is clear.               NECK:  Supple.                                                               LUNGS:  Clear.                                                               CARDIAC:  Regular rate and rhythm.  S1, S2.  No murmur.                      ABDOMEN:  Soft, positive bowel sounds, nontender.  No hepatosplenomegaly or masses.  No rebound or guarding.                                             EXTREMITIES:  No edema.     MENTAL STATUS:  Normal, alert and oriented.      ASSESSMENT/PLAN:     Assessment: Hematochezia    Plan: Colonoscopy

## 2023-03-27 NOTE — ANESTHESIA PREPROCEDURE EVALUATION
03/27/2023  Dustin Owens is a 67 y.o., male.      Pre-op Assessment    I have reviewed the Patient Summary Reports.     I have reviewed the Nursing Notes. I have reviewed the NPO Status.   I have reviewed the Medications.     Review of Systems  Anesthesia Hx:  Denies Family Hx of Anesthesia complications.   Denies Personal Hx of Anesthesia complications.   Cardiovascular:   hyperlipidemia    Pulmonary:   Asthma moderate    Hepatic/GI:   Bowel Prep.    Musculoskeletal:  Spine Disorders: cervical Disc disease and Chronic Pain    Psych:   Psychiatric History          Physical Exam  General: Well nourished, Cooperative, Alert and Oriented    Airway:  Mallampati: III / II          Anesthesia Plan  Type of Anesthesia, risks & benefits discussed:    Anesthesia Type: Gen Natural Airway  Intra-op Monitoring Plan: Standard ASA Monitors  Induction:  IV  Informed Consent: Informed consent signed with the Patient and all parties understand the risks and agree with anesthesia plan.  All questions answered.   ASA Score: 2    Ready For Surgery From Anesthesia Perspective.     .

## 2023-03-27 NOTE — TRANSFER OF CARE
"Anesthesia Transfer of Care Note    Patient: Dustin Owens    Procedure(s) Performed: Procedure(s) (LRB):  COLONOSCOPY (N/A)    Patient location: PACU    Anesthesia Type: general    Transport from OR: Transported from OR on room air with adequate spontaneous ventilation    Post pain: adequate analgesia    Post assessment: no apparent anesthetic complications and tolerated procedure well    Post vital signs: stable    Level of consciousness: sedated    Nausea/Vomiting: no nausea/vomiting    Complications: none    Transfer of care protocol was followed      Last vitals:   Visit Vitals  /74 (BP Location: Left arm, Patient Position: Lying)   Pulse 76   Temp 37 °C (98.6 °F) (Skin)   Resp 18   Ht 5' 9" (1.753 m)   Wt 78 kg (172 lb)   SpO2 98%   BMI 25.40 kg/m²     "

## 2023-03-29 LAB
FINAL PATHOLOGIC DIAGNOSIS: NORMAL
GROSS: NORMAL
Lab: NORMAL

## 2023-04-06 RX ORDER — SILDENAFIL 50 MG/1
50 TABLET, FILM COATED ORAL DAILY PRN
Qty: 15 TABLET | Refills: 9 | Status: SHIPPED | OUTPATIENT
Start: 2023-04-06 | End: 2023-11-21 | Stop reason: SDUPTHER

## 2023-04-06 NOTE — TELEPHONE ENCOUNTER
No new care gaps identified.  [facility] Embedded Care Gaps. Reference number: [id number]. 4/06/2023   1:29:51 PM CDT

## 2023-04-25 ENCOUNTER — PATIENT MESSAGE (OUTPATIENT)
Dept: RESEARCH | Facility: HOSPITAL | Age: 68
End: 2023-04-25
Payer: MEDICARE

## 2023-05-02 ENCOUNTER — PATIENT MESSAGE (OUTPATIENT)
Dept: RESEARCH | Facility: HOSPITAL | Age: 68
End: 2023-05-02
Payer: MEDICARE

## 2023-05-16 ENCOUNTER — PATIENT MESSAGE (OUTPATIENT)
Dept: RESEARCH | Facility: HOSPITAL | Age: 68
End: 2023-05-16
Payer: MEDICARE

## 2023-06-08 ENCOUNTER — LAB VISIT (OUTPATIENT)
Dept: LAB | Facility: HOSPITAL | Age: 68
End: 2023-06-08
Attending: STUDENT IN AN ORGANIZED HEALTH CARE EDUCATION/TRAINING PROGRAM
Payer: MEDICARE

## 2023-06-08 ENCOUNTER — OFFICE VISIT (OUTPATIENT)
Dept: ALLERGY | Facility: CLINIC | Age: 68
End: 2023-06-08
Payer: MEDICARE

## 2023-06-08 ENCOUNTER — OFFICE VISIT (OUTPATIENT)
Dept: FAMILY MEDICINE | Facility: CLINIC | Age: 68
End: 2023-06-08
Payer: MEDICARE

## 2023-06-08 VITALS
TEMPERATURE: 98 F | HEIGHT: 69 IN | RESPIRATION RATE: 16 BRPM | OXYGEN SATURATION: 98 % | SYSTOLIC BLOOD PRESSURE: 110 MMHG | DIASTOLIC BLOOD PRESSURE: 70 MMHG | WEIGHT: 174.19 LBS | BODY MASS INDEX: 25.8 KG/M2 | HEART RATE: 84 BPM

## 2023-06-08 VITALS
HEIGHT: 69 IN | DIASTOLIC BLOOD PRESSURE: 90 MMHG | WEIGHT: 175.5 LBS | BODY MASS INDEX: 26 KG/M2 | SYSTOLIC BLOOD PRESSURE: 120 MMHG

## 2023-06-08 DIAGNOSIS — R79.9 ABNORMAL FINDING OF BLOOD CHEMISTRY, UNSPECIFIED: ICD-10-CM

## 2023-06-08 DIAGNOSIS — F33.40 RECURRENT MAJOR DEPRESSIVE DISORDER, IN REMISSION: ICD-10-CM

## 2023-06-08 DIAGNOSIS — J31.0 CHRONIC RHINITIS: Primary | ICD-10-CM

## 2023-06-08 DIAGNOSIS — Z12.5 ENCOUNTER FOR PROSTATE CANCER SCREENING: ICD-10-CM

## 2023-06-08 DIAGNOSIS — F41.1 GAD (GENERALIZED ANXIETY DISORDER): ICD-10-CM

## 2023-06-08 DIAGNOSIS — J30.9 ALLERGIC RHINITIS, UNSPECIFIED SEASONALITY, UNSPECIFIED TRIGGER: ICD-10-CM

## 2023-06-08 DIAGNOSIS — J34.89 RHINORRHEA: ICD-10-CM

## 2023-06-08 DIAGNOSIS — F51.01 PRIMARY INSOMNIA: ICD-10-CM

## 2023-06-08 DIAGNOSIS — R21 RASH: ICD-10-CM

## 2023-06-08 DIAGNOSIS — G47.09 OTHER INSOMNIA: ICD-10-CM

## 2023-06-08 DIAGNOSIS — Z00.00 HEALTHCARE MAINTENANCE: Primary | ICD-10-CM

## 2023-06-08 DIAGNOSIS — J31.0 CHRONIC RHINITIS: ICD-10-CM

## 2023-06-08 DIAGNOSIS — E78.2 MIXED HYPERLIPIDEMIA: ICD-10-CM

## 2023-06-08 LAB
ALBUMIN SERPL BCP-MCNC: 3.7 G/DL (ref 3.5–5.2)
ALP SERPL-CCNC: 86 U/L (ref 55–135)
ALT SERPL W/O P-5'-P-CCNC: 21 U/L (ref 10–44)
ANION GAP SERPL CALC-SCNC: 9 MMOL/L (ref 8–16)
AST SERPL-CCNC: 22 U/L (ref 10–40)
BASOPHILS # BLD AUTO: 0.04 K/UL (ref 0–0.2)
BASOPHILS NFR BLD: 0.8 % (ref 0–1.9)
BILIRUB SERPL-MCNC: 0.5 MG/DL (ref 0.1–1)
BUN SERPL-MCNC: 8 MG/DL (ref 8–23)
CALCIUM SERPL-MCNC: 9.6 MG/DL (ref 8.7–10.5)
CHLORIDE SERPL-SCNC: 107 MMOL/L (ref 95–110)
CHOLEST SERPL-MCNC: 177 MG/DL (ref 120–199)
CHOLEST/HDLC SERPL: 4.7 {RATIO} (ref 2–5)
CO2 SERPL-SCNC: 26 MMOL/L (ref 23–29)
COMPLEXED PSA SERPL-MCNC: 0.65 NG/ML (ref 0–4)
CREAT SERPL-MCNC: 1.3 MG/DL (ref 0.5–1.4)
DIFFERENTIAL METHOD: ABNORMAL
EOSINOPHIL # BLD AUTO: 0.3 K/UL (ref 0–0.5)
EOSINOPHIL NFR BLD: 5.5 % (ref 0–8)
ERYTHROCYTE [DISTWIDTH] IN BLOOD BY AUTOMATED COUNT: 13.2 % (ref 11.5–14.5)
EST. GFR  (NO RACE VARIABLE): >60 ML/MIN/1.73 M^2
ESTIMATED AVG GLUCOSE: 117 MG/DL (ref 68–131)
GLUCOSE SERPL-MCNC: 103 MG/DL (ref 70–110)
HBA1C MFR BLD: 5.7 % (ref 4–5.6)
HCT VFR BLD AUTO: 48.4 % (ref 40–54)
HDLC SERPL-MCNC: 38 MG/DL (ref 40–75)
HDLC SERPL: 21.5 % (ref 20–50)
HGB BLD-MCNC: 14.9 G/DL (ref 14–18)
IGE SERPL-ACNC: 60 IU/ML (ref 0–100)
IMM GRANULOCYTES # BLD AUTO: 0.01 K/UL (ref 0–0.04)
IMM GRANULOCYTES NFR BLD AUTO: 0.2 % (ref 0–0.5)
LDLC SERPL CALC-MCNC: 100.8 MG/DL (ref 63–159)
LYMPHOCYTES # BLD AUTO: 2 K/UL (ref 1–4.8)
LYMPHOCYTES NFR BLD: 38.7 % (ref 18–48)
MCH RBC QN AUTO: 27.5 PG (ref 27–31)
MCHC RBC AUTO-ENTMCNC: 30.8 G/DL (ref 32–36)
MCV RBC AUTO: 90 FL (ref 82–98)
MONOCYTES # BLD AUTO: 0.4 K/UL (ref 0.3–1)
MONOCYTES NFR BLD: 8 % (ref 4–15)
NEUTROPHILS # BLD AUTO: 2.5 K/UL (ref 1.8–7.7)
NEUTROPHILS NFR BLD: 46.8 % (ref 38–73)
NONHDLC SERPL-MCNC: 139 MG/DL
NRBC BLD-RTO: 0 /100 WBC
PLATELET # BLD AUTO: 162 K/UL (ref 150–450)
PMV BLD AUTO: 11.5 FL (ref 9.2–12.9)
POTASSIUM SERPL-SCNC: 3.6 MMOL/L (ref 3.5–5.1)
PROT SERPL-MCNC: 7 G/DL (ref 6–8.4)
RBC # BLD AUTO: 5.41 M/UL (ref 4.6–6.2)
SODIUM SERPL-SCNC: 142 MMOL/L (ref 136–145)
TRIGL SERPL-MCNC: 191 MG/DL (ref 30–150)
WBC # BLD AUTO: 5.24 K/UL (ref 3.9–12.7)

## 2023-06-08 PROCEDURE — 99999 PR PBB SHADOW E&M-EST. PATIENT-LVL IV: CPT | Mod: PBBFAC,,, | Performed by: STUDENT IN AN ORGANIZED HEALTH CARE EDUCATION/TRAINING PROGRAM

## 2023-06-08 PROCEDURE — 99204 PR OFFICE/OUTPT VISIT, NEW, LEVL IV, 45-59 MIN: ICD-10-PCS | Mod: S$PBB,,, | Performed by: STUDENT IN AN ORGANIZED HEALTH CARE EDUCATION/TRAINING PROGRAM

## 2023-06-08 PROCEDURE — 99213 OFFICE O/P EST LOW 20 MIN: CPT | Mod: PBBFAC,27,PO | Performed by: STUDENT IN AN ORGANIZED HEALTH CARE EDUCATION/TRAINING PROGRAM

## 2023-06-08 PROCEDURE — 86592 SYPHILIS TEST NON-TREP QUAL: CPT | Performed by: STUDENT IN AN ORGANIZED HEALTH CARE EDUCATION/TRAINING PROGRAM

## 2023-06-08 PROCEDURE — 99999 PR PBB SHADOW E&M-EST. PATIENT-LVL IV: ICD-10-PCS | Mod: PBBFAC,,, | Performed by: STUDENT IN AN ORGANIZED HEALTH CARE EDUCATION/TRAINING PROGRAM

## 2023-06-08 PROCEDURE — 84153 ASSAY OF PSA TOTAL: CPT | Performed by: STUDENT IN AN ORGANIZED HEALTH CARE EDUCATION/TRAINING PROGRAM

## 2023-06-08 PROCEDURE — 99214 PR OFFICE/OUTPT VISIT, EST, LEVL IV, 30-39 MIN: ICD-10-PCS | Mod: S$PBB,,, | Performed by: STUDENT IN AN ORGANIZED HEALTH CARE EDUCATION/TRAINING PROGRAM

## 2023-06-08 PROCEDURE — 85025 COMPLETE CBC W/AUTO DIFF WBC: CPT | Performed by: STUDENT IN AN ORGANIZED HEALTH CARE EDUCATION/TRAINING PROGRAM

## 2023-06-08 PROCEDURE — 99214 OFFICE O/P EST MOD 30 MIN: CPT | Mod: PBBFAC,PO | Performed by: STUDENT IN AN ORGANIZED HEALTH CARE EDUCATION/TRAINING PROGRAM

## 2023-06-08 PROCEDURE — 99999 PR PBB SHADOW E&M-EST. PATIENT-LVL III: ICD-10-PCS | Mod: PBBFAC,,, | Performed by: STUDENT IN AN ORGANIZED HEALTH CARE EDUCATION/TRAINING PROGRAM

## 2023-06-08 PROCEDURE — 99214 OFFICE O/P EST MOD 30 MIN: CPT | Mod: S$PBB,,, | Performed by: STUDENT IN AN ORGANIZED HEALTH CARE EDUCATION/TRAINING PROGRAM

## 2023-06-08 PROCEDURE — 80053 COMPREHEN METABOLIC PANEL: CPT | Performed by: STUDENT IN AN ORGANIZED HEALTH CARE EDUCATION/TRAINING PROGRAM

## 2023-06-08 PROCEDURE — 99204 OFFICE O/P NEW MOD 45 MIN: CPT | Mod: S$PBB,,, | Performed by: STUDENT IN AN ORGANIZED HEALTH CARE EDUCATION/TRAINING PROGRAM

## 2023-06-08 PROCEDURE — 80061 LIPID PANEL: CPT | Performed by: STUDENT IN AN ORGANIZED HEALTH CARE EDUCATION/TRAINING PROGRAM

## 2023-06-08 PROCEDURE — 83036 HEMOGLOBIN GLYCOSYLATED A1C: CPT | Performed by: STUDENT IN AN ORGANIZED HEALTH CARE EDUCATION/TRAINING PROGRAM

## 2023-06-08 PROCEDURE — 86003 ALLG SPEC IGE CRUDE XTRC EA: CPT | Performed by: STUDENT IN AN ORGANIZED HEALTH CARE EDUCATION/TRAINING PROGRAM

## 2023-06-08 PROCEDURE — 86003 ALLG SPEC IGE CRUDE XTRC EA: CPT | Mod: 59 | Performed by: STUDENT IN AN ORGANIZED HEALTH CARE EDUCATION/TRAINING PROGRAM

## 2023-06-08 PROCEDURE — 99999 PR PBB SHADOW E&M-EST. PATIENT-LVL III: CPT | Mod: PBBFAC,,, | Performed by: STUDENT IN AN ORGANIZED HEALTH CARE EDUCATION/TRAINING PROGRAM

## 2023-06-08 PROCEDURE — 82785 ASSAY OF IGE: CPT | Performed by: STUDENT IN AN ORGANIZED HEALTH CARE EDUCATION/TRAINING PROGRAM

## 2023-06-08 PROCEDURE — 36415 COLL VENOUS BLD VENIPUNCTURE: CPT | Mod: PO | Performed by: STUDENT IN AN ORGANIZED HEALTH CARE EDUCATION/TRAINING PROGRAM

## 2023-06-08 RX ORDER — MIRTAZAPINE 7.5 MG/1
7.5 TABLET, FILM COATED ORAL NIGHTLY
Qty: 90 TABLET | Refills: 2 | Status: SHIPPED | OUTPATIENT
Start: 2023-06-08 | End: 2024-03-19

## 2023-06-08 RX ORDER — IPRATROPIUM BROMIDE 21 UG/1
2 SPRAY, METERED NASAL
Qty: 30 ML | Refills: 6 | Status: SHIPPED | OUTPATIENT
Start: 2023-06-08 | End: 2023-11-21 | Stop reason: SDUPTHER

## 2023-06-08 RX ORDER — TRIAMCINOLONE ACETONIDE 1 MG/G
CREAM TOPICAL 2 TIMES DAILY PRN
Qty: 80 G | Refills: 3 | Status: SHIPPED | OUTPATIENT
Start: 2023-06-08 | End: 2023-11-21 | Stop reason: SDUPTHER

## 2023-06-08 RX ORDER — MONTELUKAST SODIUM 10 MG/1
10 TABLET ORAL NIGHTLY
Qty: 90 TABLET | Refills: 1 | Status: SHIPPED | OUTPATIENT
Start: 2023-06-08 | End: 2023-07-08

## 2023-06-08 NOTE — PROGRESS NOTES
[facility] Note    Subjective:    The HPI and pertinent ROS is included in the Diagnostic Impression Remarks section at the end of the note. Please see below for further details. Chief complaint is at end of note.     Dustin is a pleasant intelligent patient who is here for evaluation.     Modified Medications    No medications on file       Data reviewed 274}  Previous medical records reviewed and summarized in plan section at end of note.      If you are due for any health screening(s) below please notify me so we can arrange them to be ordered and scheduled. Most healthy patients at your age complete them, but you are free to accept or refuse. If you can't do it, I'll definitely understand. If you can, I'd certainly appreciate it!     All of your core healthy metrics are met.      The following portions of the patient's history were reviewed and updated as appropriate: allergies, current medications, past family history, past medical history, past social history, past surgical history and problem list.    He  has a past medical history of Asthma, Colon polyp, and Personal history of colonic polyps (07/11/2018).  He  has a past surgical history that includes Colonoscopy and Colonoscopy (N/A, 3/27/2023).    He  reports that he has quit smoking. He has been exposed to tobacco smoke. He has never used smokeless tobacco. He reports that he does not currently use alcohol. He reports that he does not use drugs.  He family history includes Cancer in his mother; Diabetes in his father.    Review of patient's allergies indicates:   Allergen Reactions    Amoxicillin Diarrhea    Pollen, micronized Other (See Comments)       Tobacco Use: Medium Risk    Smoking Tobacco Use: Former    Smokeless Tobacco Use: Never    Passive Exposure: Past     Physical Examination  General appearance: alert, cooperative, no distress  Neck: no thyromegaly, no neck stiffness  Lungs: clear to auscultation, no wheezes, rales or  "rhonchi, symmetric air entry  Heart: normal rate, regular rhythm, normal S1, S2, no murmurs, rubs, clicks or gallops  Abdomen: soft, nontender, nondistended, no rigidity, rebound, or guarding.   Back: no point tenderness over spine  Extremities: peripheral pulses normal, no unilateral leg swelling or calf tenderness   Neurological:alert, oriented, normal speech, no new focal findings or movement disorder noted from baseline    BP Readings from Last 3 Encounters:   06/08/23 110/70   03/27/23 120/72   02/10/23 138/80     Wt Readings from Last 3 Encounters:   06/08/23 79 kg (174 lb 2.6 oz)   03/27/23 78 kg (172 lb)   02/10/23 78.8 kg (173 lb 12.8 oz)     /70 (BP Location: Right arm, Patient Position: Sitting, BP Method: Large (Manual))   Pulse 84   Temp 97.9 °F (36.6 °C) (Oral)   Resp 16   Ht 5' 9" (1.753 m)   Wt 79 kg (174 lb 2.6 oz)   SpO2 98%   BMI 25.72 kg/m²    274}  Laboratory: I have reviewed old labs below:    274}    Lab Results   Component Value Date    WBC 5.06 12/30/2022    HGB 14.6 12/30/2022    HCT 45.8 12/30/2022    MCV 88 12/30/2022     12/30/2022     12/30/2022    K 4.1 12/30/2022     12/30/2022    CALCIUM 9.1 12/30/2022    CO2 26 12/30/2022    GLU 91 12/30/2022    BUN 10 12/30/2022    CREATININE 1.2 12/30/2022    ANIONGAP 8 12/30/2022    PROT 7.1 12/30/2022    ALBUMIN 4.0 12/30/2022    BILITOT 0.7 12/30/2022    ALKPHOS 83 12/30/2022    ALT 36 12/30/2022    AST 29 12/30/2022    CHOL 208 (H) 06/29/2022    TRIG 53 06/29/2022    HDL 51 06/29/2022    LDLCALC 146.4 06/29/2022    TSH 1.92 08/12/2008    PSA 0.38 06/29/2022     Lab reviewed by me: Particular labs of significance that I will monitor, workup, or treat to improve are mentioned below in diagnostic impression remarks.    Imaging/EKG: I have reviewed the pertinent results and my findings are noted in remarks.  274}    CC:   Chief Complaint   Patient presents with    Annual Exam    Anxiety    Insomnia        " "274}    Assessment/Plan  Dustin Owens is a 67 y.o. male who presents to clinic with:  1. Healthcare maintenance    2. Allergic rhinitis, unspecified seasonality, unspecified trigger    3. Mixed hyperlipidemia    4. Other insomnia    5. Recurrent major depressive disorder, in remission    6. Abnormal finding of blood chemistry, unspecified    7. Encounter for prostate cancer screening    8. Primary insomnia    9. Rash    10. LICO (generalized anxiety disorder)       274}  Diagnostic Impression Remarks + HPI     Documentation entered by me for this encounter may have been done in part using speech-recognition technology. Although I have made an effort to ensure accuracy, "sound like" errors may exist and should be interpreted in context.     Allergic rhinitis-needs improvement tried Flonase but he irritated his nose also try azelastine is taking oral antihistamine not enough relief will send in Singulair amend for him to try different nasal steroid will put in referral for allergy  Insomnia-needs improvement try trazodone but caused him to be too drowsy of the night does not know if he snores will send in sleep study will give a trial of mirtazapine is aware of the weight gain side effect he reports that does want increase his appetite    LICO-needs improvement has some anxiety did try Lexapro in the past without improvement mirtazapine      Depression-stable monitor no SI/plan       Rash patient reports some rash on the center of his chest itchy not apparent today not clear of etiology will send in steroid cream recommend see derm     Hyperlipidemia control uncertain repeat blood work  This is the extent of this pleasant patient's concerns at this present time. He did not feel chest pain upon exertion, dyspnea, nausea, vomiting, diaphoresis, or syncope. No pleuritic chest pain, unilateral leg swelling, calf tenderness, or calf pain. Negative for unintentional weight loss night sweats and fevers. Dustin will return " to clinic in a few months for further workup and reassessment or sooner as needed. He was instructed to call the clinic or go to the emergency department or urgent care immediately if his symptoms do not improve, worsens, or if any new symptoms develop. As we discussed that symptoms could worsen over the next 24 hours he was advised that if any increased swelling, pain, or numbness arise to go immediately to the ED. Patient knows to call any time if an emergency arises. Shared decision making occurred and he verbalized understanding in agreement with this plan. I discussed imaging findings, diagnosis, possibilities, treatment options, medications, risks, and benefits. He had many questions regarding the options and long-term effects. All questions were answered. He expressed understanding after counseling regarding the diagnosis and recommendations. He was capable and demonstrated competence with understanding of these options. Shared decision making was performed resulting in him choosing the current treatment plan. Patient handout was given with instructions and recommendations. Advised the patient that if they become pregnant to alert us immediately to assess for medication changes. I also discussed the importance of close follow up to discuss labs, change or modify his medications if needed, monitor side effects, and further evaluation of medical problems.     Additional workup planned: see labs ordered below.    See below for labs and meds ordered with associated diagnosis      1. Healthcare maintenance    2. Allergic rhinitis, unspecified seasonality, unspecified trigger  - montelukast (SINGULAIR) 10 mg tablet; Take 1 tablet (10 mg total) by mouth every evening.  Dispense: 90 tablet; Refill: 1  - Ambulatory referral/consult to Allergy; Future    3. Mixed hyperlipidemia    4. Other insomnia  - Home Sleep Study; Future    5. Recurrent major depressive disorder, in remission    6. Abnormal finding of blood  chemistry, unspecified  - CBC Auto Differential; Future  - Comprehensive Metabolic Panel; Future  - Hemoglobin A1C; Future  - Lipid Panel; Future    7. Encounter for prostate cancer screening  - PSA, Screening; Future    8. Primary insomnia  - Home Sleep Study; Future  - mirtazapine (REMERON) 7.5 MG Tab; Take 1 tablet (7.5 mg total) by mouth every evening.  Dispense: 90 tablet; Refill: 2    9. Rash  - RPR; Future  - triamcinolone acetonide 0.1% (KENALOG) 0.1 % cream; Apply topically 2 (two) times daily as needed (rash).  Dispense: 80 g; Refill: 3    10. LICO (generalized anxiety disorder)  - mirtazapine (REMERON) 7.5 MG Tab; Take 1 tablet (7.5 mg total) by mouth every evening.  Dispense: 90 tablet; Refill: 2      Jimbo Howe MD   274}    If you are due for any health screening(s) below please notify me so we can arrange them to be ordered and scheduled. Most healthy patients at your age complete them, but you are free to accept or refuse.     If you can't do it, I'll definitely understand. If you can, I'd certainly appreciate it!   All of your core healthy metrics are met.

## 2023-06-08 NOTE — PROGRESS NOTES
"[facility]ERGY & [facility]MM[facility]OLOGY CLINIC -  NEW   PATIENT     HISTORY OF PRESENT ILLNESS     Patient ID: Dustin Owens is a 67 y.o. male    CC:   Chief Complaint   Patient presents with    Allergies     Allergy concerns        HPI: Dustin Owens is a 67 y.o. male presents for evaluation of:    Rhinitis: Endorses lifelong episodes of runny nose, nasal congestion, sneezing as well as itchy/watery eyes. Symptoms worsened around dust, dogs, and "pollen." Recently prescribed fluticasone but discontinued due to "irritation." Has not tried OTC antihistamines nor additional nasal sprays. Sinus surgery 20+ years ago with no relief of symptoms. Averages 2 sinus infections per year. Most bothersome symptom is a runny nose. Mostly a clear runny nose    Asthma as a child, no inhaler usage since 15 years of age    Amoxicillin: Causes diarrhea when he takes for prolonged period of time.     H/o Asthma: denies  H/o Eczema: denies  H/o Rhinitis: denies  Oral Allergy:  denies  Food Allergy: denies  Venom Allergy: denies  Latex Allergy: denies  Env/Occ: denies any environmental or occupational exposures     REVIEW OF SYSTEMS     CONST: no F/C/NS, no unintentional weight changes    Balance of review of systems negative except as mentioned above     MEDICAL HISTORY     MedHx: active problems reviewed  SurgHx:   Past Surgical History:   Procedure Laterality Date    COLONOSCOPY      COLONOSCOPY N/A 3/27/2023    Procedure: COLONOSCOPY;  Surgeon: Arjun Ramos MD;  Location: [facility];  Service: Endoscopy;  Laterality: N/A;       SocHx:   -Denies smoking history and illicit drug use   -Alcohol Use:Denies  -Pets: Denies  -Mold/Water Damage: Denies    FamHx:   -Rhinitis: Mother, Son   Otherwise no Family History of asthma, allergic rhinitis, atopic dermatitis, drug allergy, food allergy, venom allergy or immune deficiency.     Allergies: see below  Medications: MAR reviewed       PHYSICAL EXAM     VS: BP (!) 120/90 (BP Location: Right arm, " "Patient Position: Sitting, BP Method: Large (Manual))   Ht 5' 9" (1.753 m)   Wt 79.6 kg (175 lb 8.1 oz)   BMI 25.92 kg/m²   GENERAL: awake, alert, cooperative with exam  EYES: PERRL, EOMI, no conjunctival injection, no discharge, no infraorbital shiners  EARS: external auditory canals normal B/L, TM normal B/L  NOSE: NT 3+ and pale B/L, +stringing mucous, no polyps  ORAL: MMM, no ulcers, no thrush, no cobblestoning  LUNGS: CTAB, no w/r/c, no increased WOB  HEART: Normal Rate and regular rhythm, normal S1/S2, no m/g/r  EXTREMITIES: +2 distal pulses, no c/c/e  DERM: no rashes, no skin breaks     CHART REVIEW     PCP Notes     ASSESSMENT     Dustin Owens is a 67 y.o. male with         1. Chronic rhinitis    2. Rhinorrhea           PLAN       Allergic vs vasomotor in etiology with predominant symptom being rhinorrhea. Given family history, likely allergic etiology in nature. Screen with region 6 panel and start trial of Atrovent nasal spray as needed. Recommend observed challenge as reaction with Amoxicillin likely side effects rather than IgE mediated allergy.          Follow up: Pending results-Message with results       Andre Wray MD    I spent a total of 45 minutes on the day of the visit.This includes face to face time and non-face to face time preparing to see the patient (eg, review of tests), obtaining and/or reviewing separately obtained history, documenting clinical information in the electronic or other health record, independently interpreting results and communicating results to the patient/family/caregiver, or care coordinator.      "

## 2023-06-09 LAB — RPR SER QL: NORMAL

## 2023-06-12 ENCOUNTER — TELEPHONE (OUTPATIENT)
Dept: FAMILY MEDICINE | Facility: CLINIC | Age: 68
End: 2023-06-12
Payer: MEDICARE

## 2023-06-12 ENCOUNTER — PATIENT MESSAGE (OUTPATIENT)
Dept: FAMILY MEDICINE | Facility: CLINIC | Age: 68
End: 2023-06-12
Payer: MEDICARE

## 2023-06-12 NOTE — TELEPHONE ENCOUNTER
----- Message from Jimbo Howe MD sent at 6/9/2023  7:59 AM CDT -----  Regarding: bp check  Please call to obtain home bp reading or set up a nurse bp visit since the patients last recorded bp was elevated in our records. Thanks.

## 2023-06-13 ENCOUNTER — CLINICAL SUPPORT (OUTPATIENT)
Dept: FAMILY MEDICINE | Facility: CLINIC | Age: 68
End: 2023-06-13
Payer: MEDICARE

## 2023-06-13 ENCOUNTER — TELEPHONE (OUTPATIENT)
Dept: FAMILY MEDICINE | Facility: CLINIC | Age: 68
End: 2023-06-13
Payer: MEDICARE

## 2023-06-13 VITALS — SYSTOLIC BLOOD PRESSURE: 118 MMHG | DIASTOLIC BLOOD PRESSURE: 70 MMHG

## 2023-06-13 DIAGNOSIS — Z01.30 BP CHECK: Primary | ICD-10-CM

## 2023-06-13 LAB
A ALTERNATA IGE QN: <0.1 KU/L
A FUMIGATUS IGE QN: <0.1 KU/L
BERMUDA GRASS IGE QN: 0.1 KU/L
CAT DANDER IGE QN: <0.1 KU/L
CEDAR IGE QN: <0.1 KU/L
D FARINAE IGE QN: <0.1 KU/L
D PTERONYSS IGE QN: <0.1 KU/L
DEPRECATED A ALTERNATA IGE RAST QL: NORMAL
DEPRECATED A FUMIGATUS IGE RAST QL: NORMAL
DEPRECATED BERMUDA GRASS IGE RAST QL: NORMAL
DEPRECATED CAT DANDER IGE RAST QL: NORMAL
DEPRECATED CEDAR IGE RAST QL: NORMAL
DEPRECATED D FARINAE IGE RAST QL: NORMAL
DEPRECATED D PTERONYSS IGE RAST QL: NORMAL
DEPRECATED DOG DANDER IGE RAST QL: NORMAL
DEPRECATED ELDER IGE RAST QL: NORMAL
DEPRECATED ENGL PLANTAIN IGE RAST QL: NORMAL
DEPRECATED PECAN/HICK TREE IGE RAST QL: NORMAL
DEPRECATED ROACH IGE RAST QL: ABNORMAL
DEPRECATED TIMOTHY IGE RAST QL: ABNORMAL
DEPRECATED WEST RAGWEED IGE RAST QL: NORMAL
DEPRECATED WHITE OAK IGE RAST QL: NORMAL
DOG DANDER IGE QN: <0.1 KU/L
ELDER IGE QN: <0.1 KU/L
ENGL PLANTAIN IGE QN: <0.1 KU/L
PECAN/HICK TREE IGE QN: <0.1 KU/L
ROACH IGE QN: 0.4 KU/L
TIMOTHY IGE QN: 0.26 KU/L
WEST RAGWEED IGE QN: <0.1 KU/L
WHITE OAK IGE QN: <0.1 KU/L

## 2023-06-13 PROCEDURE — 99211 OFF/OP EST MAY X REQ PHY/QHP: CPT | Mod: PBBFAC,PO

## 2023-06-13 PROCEDURE — 99999 PR PBB SHADOW E&M-EST. PATIENT-LVL I: CPT | Mod: PBBFAC,,,

## 2023-06-13 PROCEDURE — 99999 PR PBB SHADOW E&M-EST. PATIENT-LVL I: ICD-10-PCS | Mod: PBBFAC,,,

## 2023-06-13 NOTE — TELEPHONE ENCOUNTER
Dustin Owens 67 y.o. male is here today for Blood Pressure check.   History of HTN no, was only elevated at last visit 6/8 w/ Dr. Howe. 120/90 Pt reported mother w/hx of hypertension.    Review of patient's allergies indicates:   Allergen Reactions    Amoxicillin Diarrhea    Pollen, micronized Other (See Comments)     Creatinine   Date Value Ref Range Status   06/08/2023 1.3 0.5 - 1.4 mg/dL Final     Sodium   Date Value Ref Range Status   06/08/2023 142 136 - 145 mmol/L Final     Potassium   Date Value Ref Range Status   06/08/2023 3.6 3.5 - 5.1 mmol/L Final     Patient not RX'd any blood pressure medications at this time.    Current Outpatient Medications:     albuterol (VENTOLIN HFA) 90 mcg/actuation inhaler, Inhale 2 puffs into the lungs every 6 (six) hours as needed for Wheezing. Rescue, Disp: 18 g, Rfl: 0    ipratropium (ATROVENT) 21 mcg (0.03 %) nasal spray, 2 sprays by Each Nostril route every 4 to 6 hours as needed for Rhinitis., Disp: 30 mL, Rfl: 6    mirtazapine (REMERON) 7.5 MG Tab, Take 1 tablet (7.5 mg total) by mouth every evening., Disp: 90 tablet, Rfl: 2    montelukast (SINGULAIR) 10 mg tablet, Take 1 tablet (10 mg total) by mouth every evening., Disp: 90 tablet, Rfl: 1    sildenafiL (VIAGRA) 50 MG tablet, Take 1 tablet (50 mg total) by mouth daily as needed for Erectile Dysfunction., Disp: 15 tablet, Rfl: 9    triamcinolone acetonide 0.1% (KENALOG) 0.1 % cream, Apply topically 2 (two) times daily as needed (rash)., Disp: 80 g, Rfl: 3    Current Facility-Administered Medications:     LIDOcaine HCL 10 mg/ml (1%) injection 3 mL, 3 mL, Other, Once, Vira Bautista, DO    Does patient have record of home blood pressure readings no. Only has one value from a blood   Pressure monitor which he borrowed from his sister and the reading yesterday was 150/94. Pt. States he  Will purchase an [employer] or [employer] bp monitor after both of us reviewing the article from www.validatebp.org  So he can  "keep an eye on his BP at home    Patient is symptomatic.   Complains of having headaches "off and on" that rate at a "2" on the pain scale, as well as dizziness, "every now and then upon standing and walking" He  Denies any shortness of breath, chest pain or palpitations at this time.  Pt was also not able to get into his pt portal to review your recent result notes from 6/8 labs due to technical issues getting [facility] so I provided ( from Zaira IVERSON)  The phone number for technical support  to patient, and also reviewed pt. Education information on exercise and low fat diet.    Initial BP today was 118/68  Blood pressure reading after 15 minutes was 118/70, Pulse 84.  Dr. Howe  notified.    "

## 2023-06-15 ENCOUNTER — PATIENT MESSAGE (OUTPATIENT)
Dept: ALLERGY | Facility: CLINIC | Age: 68
End: 2023-06-15
Payer: MEDICARE

## 2023-06-19 ENCOUNTER — PROCEDURE VISIT (OUTPATIENT)
Dept: SLEEP MEDICINE | Facility: HOSPITAL | Age: 68
End: 2023-06-19
Attending: STUDENT IN AN ORGANIZED HEALTH CARE EDUCATION/TRAINING PROGRAM
Payer: MEDICARE

## 2023-06-19 DIAGNOSIS — G47.09 OTHER INSOMNIA: ICD-10-CM

## 2023-06-19 DIAGNOSIS — F51.01 PRIMARY INSOMNIA: ICD-10-CM

## 2023-06-19 PROCEDURE — 95806 SLEEP STUDY UNATT&RESP EFFT: CPT

## 2023-07-11 ENCOUNTER — HOSPITAL ENCOUNTER (EMERGENCY)
Facility: HOSPITAL | Age: 68
Discharge: HOME | End: 2023-07-11
Attending: EMERGENCY MEDICINE
Payer: MEDICARE

## 2023-07-11 VITALS
RESPIRATION RATE: 18 BRPM | SYSTOLIC BLOOD PRESSURE: 114 MMHG | HEART RATE: 77 BPM | BODY MASS INDEX: 25.77 KG/M2 | WEIGHT: 174 LBS | DIASTOLIC BLOOD PRESSURE: 72 MMHG | OXYGEN SATURATION: 99 % | TEMPERATURE: 98 F | HEIGHT: 69 IN

## 2023-07-11 DIAGNOSIS — K52.9 GASTROENTERITIS: Primary | ICD-10-CM

## 2023-07-11 LAB
ALBUMIN SERPL BCP-MCNC: 3.8 G/DL (ref 3.5–5.2)
ALP SERPL-CCNC: 81 U/L (ref 55–135)
ALT SERPL W/O P-5'-P-CCNC: 36 U/L (ref 10–44)
ANION GAP SERPL CALC-SCNC: 9 MMOL/L (ref 8–16)
AST SERPL-CCNC: 24 U/L (ref 10–40)
BASOPHILS # BLD AUTO: 0.03 K/UL (ref 0–0.2)
BASOPHILS NFR BLD: 0.6 % (ref 0–1.9)
BILIRUB SERPL-MCNC: 0.5 MG/DL (ref 0.1–1)
BUN SERPL-MCNC: 11 MG/DL (ref 8–23)
C DIFF GDH STL QL: NEGATIVE
C DIFF TOX A+B STL QL IA: NEGATIVE
CALCIUM SERPL-MCNC: 9.2 MG/DL (ref 8.7–10.5)
CHLORIDE SERPL-SCNC: 106 MMOL/L (ref 95–110)
CO2 SERPL-SCNC: 24 MMOL/L (ref 23–29)
CREAT SERPL-MCNC: 1.3 MG/DL (ref 0.5–1.4)
DIFFERENTIAL METHOD: NORMAL
EOSINOPHIL # BLD AUTO: 0.4 K/UL (ref 0–0.5)
EOSINOPHIL NFR BLD: 7.9 % (ref 0–8)
ERYTHROCYTE [DISTWIDTH] IN BLOOD BY AUTOMATED COUNT: 13.1 % (ref 11.5–14.5)
EST. GFR  (NO RACE VARIABLE): >60 ML/MIN/1.73 M^2
GLUCOSE SERPL-MCNC: 86 MG/DL (ref 70–110)
HCT VFR BLD AUTO: 47.2 % (ref 40–54)
HGB BLD-MCNC: 15.1 G/DL (ref 14–18)
IMM GRANULOCYTES # BLD AUTO: 0.01 K/UL (ref 0–0.04)
IMM GRANULOCYTES NFR BLD AUTO: 0.2 % (ref 0–0.5)
LIPASE SERPL-CCNC: 74 U/L (ref 4–60)
LYMPHOCYTES # BLD AUTO: 2 K/UL (ref 1–4.8)
LYMPHOCYTES NFR BLD: 37.7 % (ref 18–48)
MCH RBC QN AUTO: 27.4 PG (ref 27–31)
MCHC RBC AUTO-ENTMCNC: 32 G/DL (ref 32–36)
MCV RBC AUTO: 86 FL (ref 82–98)
MONOCYTES # BLD AUTO: 0.8 K/UL (ref 0.3–1)
MONOCYTES NFR BLD: 14.8 % (ref 4–15)
NEUTROPHILS # BLD AUTO: 2.1 K/UL (ref 1.8–7.7)
NEUTROPHILS NFR BLD: 38.8 % (ref 38–73)
NRBC BLD-RTO: 0 /100 WBC
OB PNL STL: NEGATIVE
PLATELET # BLD AUTO: 174 K/UL (ref 150–450)
PMV BLD AUTO: 9.6 FL (ref 9.2–12.9)
POTASSIUM SERPL-SCNC: 4 MMOL/L (ref 3.5–5.1)
PROT SERPL-MCNC: 7.2 G/DL (ref 6–8.4)
RBC # BLD AUTO: 5.51 M/UL (ref 4.6–6.2)
RV AG STL QL IA.RAPID: NEGATIVE
SODIUM SERPL-SCNC: 139 MMOL/L (ref 136–145)
WBC # BLD AUTO: 5.41 K/UL (ref 3.9–12.7)
WBC #/AREA STL HPF: NORMAL /[HPF]

## 2023-07-11 PROCEDURE — 36415 COLL VENOUS BLD VENIPUNCTURE: CPT | Performed by: PHYSICIAN ASSISTANT

## 2023-07-11 PROCEDURE — 96375 TX/PRO/DX INJ NEW DRUG ADDON: CPT

## 2023-07-11 PROCEDURE — 87328 CRYPTOSPORIDIUM AG IA: CPT | Performed by: EMERGENCY MEDICINE

## 2023-07-11 PROCEDURE — 25500020 PHARM REV CODE 255

## 2023-07-11 PROCEDURE — 87449 NOS EACH ORGANISM AG IA: CPT | Mod: 91 | Performed by: PHYSICIAN ASSISTANT

## 2023-07-11 PROCEDURE — 89055 LEUKOCYTE ASSESSMENT FECAL: CPT | Performed by: PHYSICIAN ASSISTANT

## 2023-07-11 PROCEDURE — 99285 EMERGENCY DEPT VISIT HI MDM: CPT | Mod: 25

## 2023-07-11 PROCEDURE — 87425 ROTAVIRUS AG IA: CPT | Performed by: PHYSICIAN ASSISTANT

## 2023-07-11 PROCEDURE — 87177 OVA AND PARASITES SMEARS: CPT | Performed by: PHYSICIAN ASSISTANT

## 2023-07-11 PROCEDURE — 96361 HYDRATE IV INFUSION ADD-ON: CPT

## 2023-07-11 PROCEDURE — 83690 ASSAY OF LIPASE: CPT | Performed by: PHYSICIAN ASSISTANT

## 2023-07-11 PROCEDURE — 85025 COMPLETE CBC W/AUTO DIFF WBC: CPT | Performed by: PHYSICIAN ASSISTANT

## 2023-07-11 PROCEDURE — 87329 GIARDIA AG IA: CPT | Performed by: EMERGENCY MEDICINE

## 2023-07-11 PROCEDURE — 82272 OCCULT BLD FECES 1-3 TESTS: CPT | Performed by: PHYSICIAN ASSISTANT

## 2023-07-11 PROCEDURE — 87045 FECES CULTURE AEROBIC BACT: CPT | Performed by: PHYSICIAN ASSISTANT

## 2023-07-11 PROCEDURE — 87046 STOOL CULTR AEROBIC BACT EA: CPT | Mod: 59 | Performed by: PHYSICIAN ASSISTANT

## 2023-07-11 PROCEDURE — 96374 THER/PROPH/DIAG INJ IV PUSH: CPT

## 2023-07-11 PROCEDURE — 63600175 PHARM REV CODE 636 W HCPCS: Performed by: PHYSICIAN ASSISTANT

## 2023-07-11 PROCEDURE — 87209 SMEAR COMPLEX STAIN: CPT | Performed by: PHYSICIAN ASSISTANT

## 2023-07-11 PROCEDURE — 80053 COMPREHEN METABOLIC PANEL: CPT | Performed by: PHYSICIAN ASSISTANT

## 2023-07-11 PROCEDURE — 25000003 PHARM REV CODE 250: Performed by: PHYSICIAN ASSISTANT

## 2023-07-11 PROCEDURE — 87449 NOS EACH ORGANISM AG IA: CPT | Performed by: PHYSICIAN ASSISTANT

## 2023-07-11 RX ORDER — FAMOTIDINE 10 MG/ML
20 INJECTION INTRAVENOUS
Status: COMPLETED | OUTPATIENT
Start: 2023-07-11 | End: 2023-07-11

## 2023-07-11 RX ORDER — ONDANSETRON 8 MG/1
8 TABLET, ORALLY DISINTEGRATING ORAL 3 TIMES DAILY PRN
Qty: 20 TABLET | Refills: 0 | Status: SHIPPED | OUTPATIENT
Start: 2023-07-11 | End: 2024-03-19

## 2023-07-11 RX ORDER — ONDANSETRON 2 MG/ML
8 INJECTION INTRAMUSCULAR; INTRAVENOUS
Status: COMPLETED | OUTPATIENT
Start: 2023-07-11 | End: 2023-07-11

## 2023-07-11 RX ORDER — FAMOTIDINE 20 MG/1
20 TABLET, FILM COATED ORAL 2 TIMES DAILY
Qty: 20 TABLET | Refills: 0 | Status: SHIPPED | OUTPATIENT
Start: 2023-07-11 | End: 2024-03-19

## 2023-07-11 RX ADMIN — SODIUM CHLORIDE 1000 ML: 9 INJECTION, SOLUTION INTRAVENOUS at 02:07

## 2023-07-11 RX ADMIN — IOHEXOL 75 ML: 350 INJECTION, SOLUTION INTRAVENOUS at 01:07

## 2023-07-11 RX ADMIN — ONDANSETRON 8 MG: 2 INJECTION INTRAMUSCULAR; INTRAVENOUS at 02:07

## 2023-07-11 RX ADMIN — FAMOTIDINE 20 MG: 10 INJECTION, SOLUTION INTRAVENOUS at 02:07

## 2023-07-11 NOTE — ED PROVIDER NOTES
Encounter Date: 7/11/2023       History     Chief Complaint   Patient presents with    Diarrhea    Nausea     Patient states he is having diarrhea everytime he eats 5-6 times a day nausea x 10 day s     Dustin Owens is a 67 y.o. male presenting for evaluation of persistent watery diarrhea for the last 10 days after returning from [facility].  No fever, no chills.  A few episodes of nausea and vomiting, but no persistent episodes.  He has noticed no bloody stools, but has had bright red blood from presumed rectal irritation after wiping.  He denies any persistent abdominal pain.   He has a past medical history of Asthma, Colon polyp, and Personal history of colonic polyps (07/11/2018).      The history is provided by the patient.   Review of patient's allergies indicates:   Allergen Reactions    Amoxicillin Diarrhea    Pollen, micronized Other (See Comments)     Past Medical History:   Diagnosis Date    Asthma     Colon polyp     Personal history of colonic polyps 07/11/2018     Past Surgical History:   Procedure Laterality Date    COLONOSCOPY      COLONOSCOPY N/A 3/27/2023    Procedure: COLONOSCOPY;  Surgeon: Arjun Ramos MD;  Location: [facility];  Service: Endoscopy;  Laterality: N/A;     Family History   Problem Relation Age of Onset    Cancer Mother     Diabetes Father     Colon cancer Neg Hx     Colon polyps Neg Hx     Crohn's disease Neg Hx     Ulcerative colitis Neg Hx     Esophageal cancer Neg Hx     Stomach cancer Neg Hx      Social History     Tobacco Use    Smoking status: Former     Passive exposure: Past    Smokeless tobacco: Never   Substance Use Topics    Alcohol use: Not Currently    Drug use: Never     Review of Systems   Constitutional:  Negative for chills and fever.   Respiratory:  Negative for cough, chest tightness, shortness of breath and wheezing.    Cardiovascular:  Negative for chest pain and palpitations.   Gastrointestinal:  Positive for abdominal pain, diarrhea, nausea and  vomiting.   Genitourinary:  Negative for dysuria and hematuria.   Musculoskeletal:  Negative for arthralgias, back pain, joint swelling, myalgias, neck pain and neck stiffness.   Skin:  Negative for color change, pallor, rash and wound.   Neurological:  Negative for weakness and numbness.   Hematological:  Does not bruise/bleed easily.     Physical Exam     Initial Vitals [07/11/23 1204]   BP Pulse Resp Temp SpO2   109/65 81 17 98.4 °F (36.9 °C) 97 %      MAP       --         Physical Exam    Nursing note and vitals reviewed.  Constitutional: He appears well-developed and well-nourished. He is not diaphoretic. No distress.   HENT:   Head: Normocephalic and atraumatic.   Mouth/Throat: Oropharynx is clear and moist.   Cardiovascular:  Normal rate, regular rhythm, normal heart sounds and intact distal pulses.     Exam reveals no gallop and no friction rub.       No murmur heard.  Pulmonary/Chest: Breath sounds normal. No respiratory distress. He has no wheezes. He has no rhonchi. He has no rales. He exhibits no tenderness.   Abdominal: Abdomen is soft. He exhibits no distension and no mass. There is no abdominal tenderness.   No palpable abdominal tenderness noted.      Musculoskeletal:         General: No tenderness or edema. Normal range of motion.     Neurological: He is alert and oriented to person, place, and time. He has normal strength. No sensory deficit.   Skin: Skin is warm and dry. No rash and no abscess noted. No erythema.   Psychiatric: He has a normal mood and affect.       ED Course   Procedures  Labs Reviewed   LIPASE - Abnormal; Notable for the following components:       Result Value    Lipase 74 (*)     All other components within normal limits   CLOSTRIDIUM DIFFICILE   CULTURE, STOOL   CBC W/ AUTO DIFFERENTIAL   COMPREHENSIVE METABOLIC PANEL   OCCULT BLOOD X 1, STOOL   WBC, STOOL   ROTAVIRUS ANTIGEN, STOOL   GIARDIA/CRYPTOSPORIDIUM (EIA)   STOOL EXAM-OVA,CYSTS,PARASITES   GIARDIA/CRYPTOSPORIDIUM  (EIA)          Imaging Results              CT Abdomen Pelvis With Contrast (Final result)  Result time 07/11/23 13:47:50      Final result by Ron Aguiar MD (07/11/23 13:47:50)                   Impression:      1. Proximal gastric wall thickening could relate to inadequate distension.  Gastritis or peptic ulcer disease are also possible in appropriate clinical setting.  2. Several fluid-filled small bowel loops without dilation.  This is nonspecific.  Some considerations include diarrheal illness, enteritis or malabsorption.      Electronically signed by: Ron Aguiar  Date:    07/11/2023  Time:    13:47               Narrative:    EXAMINATION:  CT ABDOMEN PELVIS WITH CONTRAST    CLINICAL HISTORY:  Abdominal abscess/infection suspected;    TECHNIQUE:  Low dose axial images, sagittal and coronal reformations were obtained from the lung bases to the pubic symphysis following the IV administration of 75 mL of Omnipaque 350 .  Oral contrast was not given.    COMPARISON:  None.    FINDINGS:  Minimal dependent atelectasis in the lung bases.  Normal size heart.  Unremarkable gallbladder.    Solid abdominal organs including liver spleen pancreas adrenal glands and kidneys are unremarkable.    There is no enteric contrast which limits bowel assessment.  There is wall thickening of the proximal stomach.  There are multiple fluid-filled nondilated mid to distal small bowel loops.  Colon relatively decompressed.    Circumaortic left renal vein.  Unremarkable noncontrast prostate and urinary bladder.    No acute osseous findings.                                       Medications   iohexoL (OMNIPAQUE 350) 350 mg iodine/mL injection (75 mLs Intravenous Given 7/11/23 1322)   sodium chloride 0.9% bolus 1,000 mL 1,000 mL (0 mLs Intravenous Stopped 7/11/23 1542)   famotidine (PF) injection 20 mg (20 mg Intravenous Given 7/11/23 1458)   ondansetron injection 8 mg (8 mg Intravenous Given 7/11/23 1458)     Medical Decision  Making:   History:   Old Medical Records: I decided to obtain old medical records.  Old Records Summarized: records from clinic visits and records from previous admission(s).  Differential Diagnosis:   Infectious diarrhea   Electrolyte imbalance   Dehydration   Acute abdomen   Colitis   Clinical Tests:   Lab Tests: Ordered and Reviewed  Radiological Study: Ordered and Reviewed  ED Management:  Pt emergently evaluated here in the ED.   Labs stable without leukocytosis, electrolyte abnormalities, renal or liver dysfunction.  He was able to leave a stool sample, which is sent to lab for further testing.  CT scan shows evidence for possible gastritis and enteritis.  Will wait for stool studies to result prior to initiating any antibiotics given his stable vitals and lab findings.  He is given IV fluids, Pepcid and Zofran here in the ED.  He will be discharged home to follow-up with his PCP for re-evaluation as needed.  Patient voices understanding and is agreeable to the plan.  Specific return precautions are given.              Attending Attestation:     Physician Attestation Statement for NP/PA:   I have directed and reviewed the workup performed by the PA/NP.  I performed the substantive portion of the medical decision making.     Other NP/PA Attestation Additions:    History of Present Illness: 67-year-old male presents with diarrhea.    Medical Decision Making: Initial differential diagnosis included but not limited to electrolyte abnormality, dehydration, and viral illness.  I am in agreement with the physician assistant's  assessment, treatment, and plan of care.                        Clinical Impression:   Final diagnoses:  [K52.9] Gastroenteritis (Primary)        ED Disposition Condition    Discharge Stable          ED Prescriptions       Medication Sig Dispense Start Date End Date Auth. Provider    ondansetron (ZOFRAN-ODT) 8 MG TbDL Take 1 tablet (8 mg total) by mouth 3 (three) times daily as needed (nausea).  20 tablet 7/11/2023 -- Raven Daily PA-C    famotidine (PEPCID) 20 MG tablet Take 1 tablet (20 mg total) by mouth 2 (two) times daily. 20 tablet 7/11/2023 7/10/2024 Raven Daily PA-C          Follow-up Information       Follow up With Specialties Details Why Contact Info Additional Information    [facility]  As needed, If symptoms worsen [address] [place] [address] [address] [place] [address] 1st floor    Jimbo Howe MD Family Medicine  As needed [address] [facility] [address]  [phone]                Raven Daily PA-C  07/11/23 1539       Elia Kirby MD  07/11/23 161

## 2023-07-13 LAB
CRYPTOSP AG STL QL IA: NORMAL
G LAMBLIA AG STL QL IA: NORMAL

## 2023-07-14 LAB
STOOL CULTURE: NORMAL
STOOL CULTURE: NORMAL

## 2023-07-20 LAB — O+P STL MICRO: NORMAL

## 2023-07-24 ENCOUNTER — PATIENT MESSAGE (OUTPATIENT)
Dept: RESEARCH | Facility: HOSPITAL | Age: 68
End: 2023-07-24
Payer: MEDICARE

## 2023-07-31 ENCOUNTER — PATIENT MESSAGE (OUTPATIENT)
Dept: RESEARCH | Facility: HOSPITAL | Age: 68
End: 2023-07-31
Payer: MEDICARE

## 2023-07-31 ENCOUNTER — HOSPITAL ENCOUNTER (EMERGENCY)
Facility: HOSPITAL | Age: 68
Discharge: HOME | End: 2023-08-01
Attending: EMERGENCY MEDICINE
Payer: MEDICARE

## 2023-07-31 VITALS
HEIGHT: 69 IN | WEIGHT: 174 LBS | HEART RATE: 81 BPM | TEMPERATURE: 98 F | SYSTOLIC BLOOD PRESSURE: 170 MMHG | OXYGEN SATURATION: 99 % | RESPIRATION RATE: 18 BRPM | DIASTOLIC BLOOD PRESSURE: 95 MMHG | BODY MASS INDEX: 25.77 KG/M2

## 2023-07-31 DIAGNOSIS — Z20.6 HIV EXPOSURE: Primary | ICD-10-CM

## 2023-08-01 ENCOUNTER — PATIENT OUTREACH (OUTPATIENT)
Dept: ADMINISTRATIVE | Facility: HOSPITAL | Age: 68
End: 2023-08-01
Payer: MEDICARE

## 2023-08-01 PROCEDURE — 87522 HEPATITIS C REVRS TRNSCRPJ: CPT | Performed by: EMERGENCY MEDICINE

## 2023-08-01 PROCEDURE — 99283 EMERGENCY DEPT VISIT LOW MDM: CPT

## 2023-08-01 PROCEDURE — 36415 COLL VENOUS BLD VENIPUNCTURE: CPT | Performed by: EMERGENCY MEDICINE

## 2023-08-01 PROCEDURE — 87389 HIV-1 AG W/HIV-1&-2 AB AG IA: CPT | Performed by: EMERGENCY MEDICINE

## 2023-08-01 RX ORDER — EMTRICITABINE AND TENOFOVIR DISOPROXIL FUMARATE 200; 300 MG/1; MG/1
1 TABLET, FILM COATED ORAL DAILY
Qty: 30 TABLET | Refills: 0 | Status: SHIPPED | OUTPATIENT
Start: 2023-08-01 | End: 2023-08-14

## 2023-08-01 NOTE — ED PROVIDER NOTES
Encounter Date: 7/31/2023       History     Chief Complaint   Patient presents with    HIV Exposure     Saturday with unprotected sex     This patient has known exposure to a sexual partner with HIV that is nondetectable because that person is on prophylactic medications.  Patient is requesting prophylaxis for potential exposure to his sexual partner.  No other complaints of.    The history is provided by the patient.     Review of patient's allergies indicates:   Allergen Reactions    Amoxicillin Diarrhea    Pollen, micronized Other (See Comments)     Past Medical History:   Diagnosis Date    Asthma     Colon polyp     Personal history of colonic polyps 07/11/2018     Past Surgical History:   Procedure Laterality Date    COLONOSCOPY      COLONOSCOPY N/A 3/27/2023    Procedure: COLONOSCOPY;  Surgeon: Arjun Ramos MD;  Location: [facility];  Service: Endoscopy;  Laterality: N/A;     Family History   Problem Relation Age of Onset    Cancer Mother     Diabetes Father     Colon cancer Neg Hx     Colon polyps Neg Hx     Crohn's disease Neg Hx     Ulcerative colitis Neg Hx     Esophageal cancer Neg Hx     Stomach cancer Neg Hx      Social History     Tobacco Use    Smoking status: Former     Current packs/day: 0.00     Passive exposure: Past    Smokeless tobacco: Never   Substance Use Topics    Alcohol use: Not Currently    Drug use: Never     Review of Systems   Constitutional: Negative.    HENT: Negative.     Eyes: Negative.    Respiratory: Negative.     Cardiovascular: Negative.    Gastrointestinal: Negative.    Endocrine: Negative.    Genitourinary: Negative.    Musculoskeletal: Negative.    Skin: Negative.    Allergic/Immunologic: Negative.    Neurological: Negative.    Hematological: Negative.    Psychiatric/Behavioral: Negative.         Physical Exam     Initial Vitals [07/31/23 2220]   BP Pulse Resp Temp SpO2   (!) 170/95 81 18 98.2 °F (36.8 °C) 99 %      MAP       --         Physical Exam    Nursing note and  vitals reviewed.          ED Course   Procedures  Labs Reviewed - No data to display       Imaging Results    None          Medications - No data to display  Medical Decision Making:   ED Management:  This patient with the exposure to HIV.  Patient's partner is undetectable as they are currently on prophylactic therapy.  Patient is adamant requesting prophylaxis as well.  Obtain an HIV here in the emergency department and instructed the patient to follow-up outpatient with the health unit for further STD evaluation and care.                          Clinical Impression:   Final diagnoses:  [Z20.6] HIV exposure (Primary)        ED Disposition Condition    Discharge Stable          ED Prescriptions       Medication Sig Dispense Start Date End Date Auth. Provider    emtricitabine-tenofovir 200-300 mg (TRUVADA) 200-300 mg Tab Take 1 tablet by mouth once daily. 30 tablet 8/1/2023 8/31/2023 Chemo Mckeon MD          Follow-up Information       Follow up With Specialties Details Why Contact [facility]    Health unit for further STD testing and further STD care.  Go today               Chemo Mckeon MD  08/01/23 0058

## 2023-08-01 NOTE — ED NOTES
At D/C, Dustin REESE Owens is AA & O x 3, his skin is warm and dry, no adverse reaction to medications if given in ED, follow up care discussed at length with patient/family to include medications and to follow-up with MD; patient/family given discharge instructions along with prescriptions, as indicated, and care sheets.

## 2023-08-01 NOTE — PROGRESS NOTES
Population Health Chart Review & Patient Outreach Details:     Reason for Outreach Encounter:     [x]  Non-Compliant Report   []  Payor Report ([employer], PHN, BCBS, MSSP, MCIP, UHC, etc.)   []  Pre-Visit Chart Review     Updates Requested / Reviewed:     []  Care Everywhere    []     []  External Sources ([facility], [facility], DIS, etc.)   []  Care Team Updated    Patient Outreach Method:    [x]  Telephone Outreach Completed   [x] Successful   [] Left Voicemail   [] Unable to Contact (wrong number, no voicemail)  []  [email or web address] Portal Outreach Sent  []  Letter Outreach Mailed  []  Fax Sent for External Records  []  External Records Upload    Health Maintenance Topics Addressed and Outreach Outcomes / Actions Taken:        []      Breast Cancer Screening []  Mammo Scheduled      []  External Records Requested     []  Added Reminder to Complete to Upcoming Primary Care Appt Notes     []  Patient Declined     []  Patient Will Call Back to Schedule     []  Patient Will Schedule with External Provider / Order Routed if Applicable             []       Cervical Cancer Screening []  Pap Scheduled      []  External Records Requested     []  Added Reminder to Complete to Upcoming Primary Care Appt Notes     []  Patient Declined     []  Patient Will Call Back to Schedule     []  Patient Will Schedule with External Provider               []          Colorectal Cancer Screening []  Colonoscopy Case Request or Referral Placed     []  External Records Requested     []  Added Reminder to Complete to Upcoming Primary Care Appt Notes     []  Patient Declined     []  Patient Will Call Back to Schedule     []  Patient Will Schedule with External Provider     []  Fit Kit Mailed (add the SmartPhrase under additional notes)     []  Reminded Patient to Complete Home Test             []      Diabetic Eye Exam []  Eye Camera Scheduled or Optometry Referral Placed     []  External Records Requested     []  Added Reminder to Complete to  Upcoming Primary Care Appt Notes     []  Patient Declined     []  Patient Will Call Back to Schedule     []  Patient Will Schedule with External Provider             []      Blood Pressure Control []  Primary Care Follow Up Visit Scheduled     []  Remote Blood Pressure Reading Captured     []  Added Reminder to Complete to Upcoming Primary Care Appt Notes     []  Patient Declined     []  Patient Will Call Back / Patient Will Send Portal Message with Reading     []  Patient Will Call Back to Schedule Provider Visit             []       HbA1c & Other Labs []  Lab Appt Scheduled for Due Labs     []  Primary Care Follow Up Visit Scheduled      []  Reminded Patient to Complete Home Test     []  Added Reminder to Complete to Upcoming Primary Care Appt Notes     []  Patient Declined     []  Patient Will Call Back to Schedule     []  Patient Will Schedule with External Provider / Order Routed if Applicable           [x]    Schedule Primary Care Appt [x]  Primary Care Appt Scheduled     []  Patient Declined     []  Patient Will Call Back to Schedule     []  Pt Established with External Provider & Updated Care Team             []      Medication Adherence []  Primary Care Appointment Scheduled     []  Added Reminder to Upcoming Primary Care Appt Notes     []  Patient Reminded to  Prescription     []  Patient Declined, Provider Notified if Needed     []  Sent Provider Message to Review and/or Add Exclusion to Problem List             []      Osteoporosis Screening []  DXA Appointment Scheduled     []  External Records Requested     []  Added Reminder to Complete to Upcoming Primary Care Appt Notes     []  Patient Declined     []  Patient Will Call Back to Schedule     []  Patient Will Schedule with External Provider / Order Routed if Applicable     Additional Care Coordinator Notes:     ED FOLLOW UP SCHEDULED    Further Action Needed If Patient Returns Outreach:

## 2023-08-02 ENCOUNTER — TELEPHONE (OUTPATIENT)
Dept: FAMILY MEDICINE | Facility: CLINIC | Age: 68
End: 2023-08-02

## 2023-08-02 ENCOUNTER — OFFICE VISIT (OUTPATIENT)
Dept: FAMILY MEDICINE | Facility: CLINIC | Age: 68
End: 2023-08-02
Payer: MEDICARE

## 2023-08-02 VITALS
BODY MASS INDEX: 26.06 KG/M2 | DIASTOLIC BLOOD PRESSURE: 88 MMHG | HEART RATE: 77 BPM | OXYGEN SATURATION: 98 % | SYSTOLIC BLOOD PRESSURE: 138 MMHG | RESPIRATION RATE: 18 BRPM | HEIGHT: 69 IN | TEMPERATURE: 98 F | WEIGHT: 175.94 LBS

## 2023-08-02 DIAGNOSIS — Z20.6 HIV EXPOSURE: Primary | ICD-10-CM

## 2023-08-02 DIAGNOSIS — E78.5 HYPERLIPIDEMIA, UNSPECIFIED HYPERLIPIDEMIA TYPE: ICD-10-CM

## 2023-08-02 DIAGNOSIS — F33.1 MODERATE EPISODE OF RECURRENT MAJOR DEPRESSIVE DISORDER: ICD-10-CM

## 2023-08-02 PROCEDURE — 99999 PR PBB SHADOW E&M-EST. PATIENT-LVL V: CPT | Mod: PBBFAC,,, | Performed by: PHYSICIAN ASSISTANT

## 2023-08-02 PROCEDURE — 99214 OFFICE O/P EST MOD 30 MIN: CPT | Mod: S$PBB,,, | Performed by: PHYSICIAN ASSISTANT

## 2023-08-02 PROCEDURE — 99999 PR PBB SHADOW E&M-EST. PATIENT-LVL V: ICD-10-PCS | Mod: PBBFAC,,, | Performed by: PHYSICIAN ASSISTANT

## 2023-08-02 PROCEDURE — 99214 PR OFFICE/OUTPT VISIT, EST, LEVL IV, 30-39 MIN: ICD-10-PCS | Mod: S$PBB,,, | Performed by: PHYSICIAN ASSISTANT

## 2023-08-02 PROCEDURE — 99215 OFFICE O/P EST HI 40 MIN: CPT | Mod: PBBFAC,PO | Performed by: PHYSICIAN ASSISTANT

## 2023-08-02 NOTE — PROGRESS NOTES
"Subjective:       Patient ID: Dustin Owens is a 68 y.o. male.    Chief Complaint: Follow-up (ED follow up )    Mr. Owens is a 68 year old male with  HLD and MDD. The patient was seen in ED this week due to HIV exposure. He reports that he does practice safe sex but reports that he is 'not sure if he did it right" because he was intoxicated. His sexual partner reports being HIV positive but undetectable on antiviral. The patient had labs drawn in ED but results not yet available. The patient was prescribed truvada; he has not started. The patient is not interested in additional STD testing at this time as he reports he had this done recently at [facility] in [place].       Review of patient's allergies indicates:   Allergen Reactions    Amoxicillin Diarrhea    Pollen, micronized Other (See Comments)         Current Outpatient Medications:     emtricitabine-tenofovir 200-300 mg (TRUVADA) 200-300 mg Tab, Take 1 tablet by mouth once daily., Disp: 30 tablet, Rfl: 0    famotidine (PEPCID) 20 MG tablet, Take 1 tablet (20 mg total) by mouth 2 (two) times daily., Disp: 20 tablet, Rfl: 0    ipratropium (ATROVENT) 21 mcg (0.03 %) nasal spray, 2 sprays by Each Nostril route every 4 to 6 hours as needed for Rhinitis., Disp: 30 mL, Rfl: 6    mirtazapine (REMERON) 7.5 MG Tab, Take 1 tablet (7.5 mg total) by mouth every evening., Disp: 90 tablet, Rfl: 2    ondansetron (ZOFRAN-ODT) 8 MG TbDL, Take 1 tablet (8 mg total) by mouth 3 (three) times daily as needed (nausea)., Disp: 20 tablet, Rfl: 0    sildenafiL (VIAGRA) 50 MG tablet, Take 1 tablet (50 mg total) by mouth daily as needed for Erectile Dysfunction., Disp: 15 tablet, Rfl: 9    triamcinolone acetonide 0.1% (KENALOG) 0.1 % cream, Apply topically 2 (two) times daily as needed (rash)., Disp: 80 g, Rfl: 3    albuterol (VENTOLIN HFA) 90 mcg/actuation inhaler, Inhale 2 puffs into the lungs every 6 (six) hours as needed for Wheezing. Rescue, Disp: 18 g, Rfl: " 0    Current Facility-Administered Medications:     LIDOcaine HCL 10 mg/ml (1%) injection 3 mL, 3 mL, Other, Once, Vira Mccarhtyping, DO    Lab Results   Component Value Date    WBC 5.41 07/11/2023    HGB 15.1 07/11/2023    HCT 47.2 07/11/2023     07/11/2023    CHOL 177 06/08/2023    TRIG 191 (H) 06/08/2023    HDL 38 (L) 06/08/2023    ALT 36 07/11/2023    AST 24 07/11/2023     07/11/2023    K 4.0 07/11/2023     07/11/2023    CREATININE 1.3 07/11/2023    BUN 11 07/11/2023    CO2 24 07/11/2023    TSH 1.92 08/12/2008    PSA 0.65 06/08/2023    HGBA1C 5.7 (H) 06/08/2023       Review of Systems   Constitutional:  Negative for activity change, appetite change and fever.   HENT:  Negative for postnasal drip, rhinorrhea and sinus pressure.    Eyes:  Negative for visual disturbance.   Respiratory:  Negative for cough and shortness of breath.    Cardiovascular:  Negative for chest pain.   Gastrointestinal:  Negative for abdominal distention and abdominal pain.   Genitourinary:  Negative for difficulty urinating and dysuria.   Musculoskeletal:  Negative for arthralgias and myalgias.   Neurological:  Negative for headaches.   Hematological:  Negative for adenopathy.   Psychiatric/Behavioral:  The patient is nervous/anxious.        Objective:      Physical Exam  Constitutional:       Appearance: Normal appearance.   HENT:      Head: Normocephalic and atraumatic.   Eyes:      Conjunctiva/sclera: Conjunctivae normal.   Cardiovascular:      Rate and Rhythm: Normal rate and regular rhythm.   Pulmonary:      Effort: Pulmonary effort is normal. No respiratory distress.      Breath sounds: Normal breath sounds. No wheezing.   Abdominal:      General: There is no distension.      Palpations: There is no mass.      Tenderness: There is no abdominal tenderness.   Musculoskeletal:      Right lower leg: No edema.      Left lower leg: No edema.   Lymphadenopathy:      Cervical: No cervical adenopathy.   Skin:      Findings: No erythema.   Neurological:      Mental Status: He is alert and oriented to person, place, and time.   Psychiatric:         Behavior: Behavior normal.         Assessment:       1. HIV exposure    2. Hyperlipidemia, unspecified hyperlipidemia type    3. Moderate episode of recurrent major depressive disorder        Plan:       Dustin was seen today for follow-up.    Diagnoses and all orders for this visit:    HIV exposure  -     Ambulatory referral/consult to Infectious Disease; Future  Discussed with Dr. Howe- patient to start truvada as prescribed and follow up with ID  Will need repeat lab in 4-6 weeks and in 3 months  Hyperlipidemia, unspecified hyperlipidemia type  High fiber diet  Moderate episode of recurrent major depressive disorder  Stable on current medications.

## 2023-08-02 NOTE — TELEPHONE ENCOUNTER
Please call patient and advise that I discussed his case with Dr. Howe. Patient should start truvada and referral to infectious disease placed. Please assist with scheduling.

## 2023-08-02 NOTE — TELEPHONE ENCOUNTER
Informed pt message from provider. Pt verbalized understanding. Pt sent the numbers to schedule with infectious disease in his portal as requested.

## 2023-08-03 ENCOUNTER — PATIENT OUTREACH (OUTPATIENT)
Dept: EMERGENCY MEDICINE | Facility: HOSPITAL | Age: 68
End: 2023-08-03

## 2023-08-03 LAB
HCV IGG SERPL QL IA: NEGATIVE
HIV 1+2 AB+HIV1 P24 AG SERPL QL IA: NEGATIVE

## 2023-08-03 NOTE — PROGRESS NOTES
Esther Magaña  ED Navigator  Emergency Department    Project: [facility] ED Navigator  Role: Community Health Worker    Date: 08/03/2023  Patient Name: Dustin Owens  MRN: [id number]  PCP: Jimbo Howe MD    Assessment:     Dustin Owens is a 68 y.o. male who has presented to ED for STD Exposure. Patient has visited the ED 2 times in the past 3 months. Patient did not contact PCP.     ED Navigator Initial Assessment    ED Navigator Enrollment Documentation  Consent to Services  Does patient consent to completing the assessment?: Yes  Contact  Method of Initial Contact: Phone  Transportation  Does the patient have issues with Transportation?: No  Does the patient have transportation to and from healthcare appointments?: Yes  Insurance Coverage  Do you have coverage/adequate coverage?: Yes  Type/kind of coverage: Medicare Part A & B  Is patient able to afford co-pays/deductibles?: Yes  Is patient able to afford HME or supplies?: Yes  Does patient have an established [facility] PCP?: Yes  Able to access?: Yes  Does the patient have a lack of adequate coverage?: No  Specialist Appointment  Did the patient come to the ED to see a specialist?: No  Does the patient have a pending specialist referral?: No  Does the patient have a specialist appointment made?: No  PCP Follow Up Appointment  Has the patient had an appointment with a primary care provider in the past year?: Yes  Approximate date: 6/8/23  Provider: Jimbo Howe MD  Does the patient have a follow up appontment with a PCP?: No  When was the last time you saw your PCP?: 6/8/23  Why does the patient not have a follow up scheduled?: Other (see comments)  Medications  Is patient able to afford medication?: Yes  Is patient unable to get medication due to lack of transportation?: No  Psychological  Does the patient have psycho-social concerns?: Yes  What concerns does the patient have?: Anxiety and/or Depression  Food  Does the patient have concerns about  food?: No  Communication/Education  Does the patient have limited English proficiency/English not primary language?: No  Does patient have low literacy and/or low health literacy?: Yes  Does patient have concerns with care?: No  Does patient have dissatisfaction with care?: No  Other Financial Concerns  Does the patient have immediate financial distress?: No  Does the patient have general financial concerns?: No  Other Social Barriers/Concerns  Does the patient have any additional barriers or concerns?: None  Primary Barrier  Barriers identified: Cognitive barrier (health literacy, language and communication, etc.)  Root Cause of ED Utilization: Patient Knowledge/Low Health Literacy  Plan to address Patient Knowledge/Low Health Literacy: Provided information for [facility] 24/7 Nurse triage line [phone] or [facility] ([phone])  Next steps: Provided Education  Was education/educational materials provided surrounding PCP services/creating a medical home?: Yes Was education verbal or written?: Written     Was education/educational materials provided surrounding low cost, healthy foods?: Yes Was education verbal or written?: Written     Was education/educational materials provided surrounding other items? If so, use comment to explain.: Yes Was education verbal or written?: Written   Plan: Provided information for [facility] 24/7 Nurse triage line, [phone] or [facility] ([phone])  Expected Date of Follow Up 1: 8/31/23         Social History     Socioeconomic History    Marital status:    Tobacco Use    Smoking status: Former     Current packs/day: 0.00     Passive exposure: Past    Smokeless tobacco: Never   Substance and Sexual Activity    Alcohol use: Not Currently    Drug use: Never    Sexual activity: Yes     Partners: Female     Social Determinants of Health     Financial Resource Strain: Low Risk  (8/3/2023)    Overall Financial Resource Strain (CARDIA)     Difficulty  of Paying Living Expenses: Not hard at all   Food Insecurity: No Food Insecurity (8/3/2023)    Hunger Vital Sign     Worried About Running Out of Food in the Last Year: Never true     Ran Out of Food in the Last Year: Never true   Transportation Needs: No Transportation Needs (8/3/2023)    PRAPARE - Transportation     Lack of Transportation (Medical): No     Lack of Transportation (Non-Medical): No   Stress: No Stress Concern Present (8/3/2023)    [place] [employer] - Occupational Stress Questionnaire     Feeling of Stress : Only a little   Social Connections: Unknown (8/3/2023)    Social Connection and Isolation Panel [NHANES]     Frequency of Communication with Friends and Family: Three times a week     Frequency of Social Gatherings with Friends and Family: Three times a week     Marital Status:    Housing Stability: Unknown (8/3/2023)    Housing Stability Vital Sign     Unable to Pay for Housing in the Last Year: No     Unstable Housing in the Last Year: No       Plan:   Spoke with patient on the telephone and completed initial enrollment.  Patient reported he is doing well since his ED visit but was not able to schedule an appointment with Infectious Disease as was recommended.  Patient accepted my offer to assist with getting him an appointment and stated he needs [facility].  Patient reported he has a PCP he sees regularly and denied needing assistance with getting a follow up appointment.  Patient reported no concerns with food, housing, utilities, or transportation.  Patient stated he does not smoke or drink alcohol.  Patient stated he may need to see someone for depression/anxiety, but stated not at this time.  Patient was given education on ([facility] Right Care at the Right Level information, [facility] information, and Heart healthy diet tips), [facility] Nurse Triage line, and depression/anxiety tip sheets.    Esther Magaña  ED Navigator

## 2023-08-14 ENCOUNTER — OFFICE VISIT (OUTPATIENT)
Dept: FAMILY MEDICINE | Facility: CLINIC | Age: 68
End: 2023-08-14
Payer: MEDICARE

## 2023-08-14 VITALS
RESPIRATION RATE: 17 BRPM | OXYGEN SATURATION: 98 % | HEART RATE: 76 BPM | SYSTOLIC BLOOD PRESSURE: 122 MMHG | DIASTOLIC BLOOD PRESSURE: 66 MMHG | WEIGHT: 173.94 LBS | TEMPERATURE: 99 F | BODY MASS INDEX: 25.76 KG/M2 | HEIGHT: 69 IN

## 2023-08-14 DIAGNOSIS — M54.16 LUMBAR RADICULOPATHY: ICD-10-CM

## 2023-08-14 DIAGNOSIS — Z20.2 STD EXPOSURE: Primary | ICD-10-CM

## 2023-08-14 PROCEDURE — 99214 PR OFFICE/OUTPT VISIT, EST, LEVL IV, 30-39 MIN: ICD-10-PCS | Mod: S$PBB,,, | Performed by: PHYSICIAN ASSISTANT

## 2023-08-14 PROCEDURE — 99999 PR PBB SHADOW E&M-EST. PATIENT-LVL V: CPT | Mod: PBBFAC,,, | Performed by: PHYSICIAN ASSISTANT

## 2023-08-14 PROCEDURE — 99214 OFFICE O/P EST MOD 30 MIN: CPT | Mod: S$PBB,,, | Performed by: PHYSICIAN ASSISTANT

## 2023-08-14 PROCEDURE — 99999 PR PBB SHADOW E&M-EST. PATIENT-LVL V: ICD-10-PCS | Mod: PBBFAC,,, | Performed by: PHYSICIAN ASSISTANT

## 2023-08-14 PROCEDURE — 99215 OFFICE O/P EST HI 40 MIN: CPT | Mod: PBBFAC,PO | Performed by: PHYSICIAN ASSISTANT

## 2023-08-14 NOTE — PATIENT INSTRUCTIONS
Natanael Chan,     If you are due for any health screening(s) below please notify me so we can arrange them to be ordered and scheduled to maintain your health. Most healthy patients complete it. Don't lose out on improving your health.     All of your core healthy metrics are met.

## 2023-08-14 NOTE — PROGRESS NOTES
Subjective:       Patient ID: Dustin Owens is a 68 y.o. male.    Chief Complaint: Follow-up (1 week f/u )    Dustin Owens is a 68 year old male with a PMH of HLD and radiculopathy who presents to the clinic today for follow up about his test results. He was seen last week due to HIV exposure. His sexual partner reported bring HIV positive but undetectable on antiviral. 4th gen HIV Ag/Ab negative and patient aware of this result. He reports having an appointment with infectious disease in September. He was prescribed truvada but never took the medication as he states that he read that it had to be taken within 7-8 days of exposure, and by the time he picked up the medication, it was already past this time frame. He reports practicing safe sex.     He additionally reports burning in his feet occasionally at night that keeps him up. Patient has history of cervical radiculopathy for which he has been prescribed gabapentin. The patient is taking this for his feet with improvement in symptoms. The patient did have an MRI of his low back that revealed degenerative changes and narrowing. The patient does not like taking medication and would be open to having a procedure to help with the pain.       Review of patient's allergies indicates:   Allergen Reactions    Amoxicillin Diarrhea    Pollen, micronized Other (See Comments)         Current Outpatient Medications:     emtricitabine-tenofovir 200-300 mg (TRUVADA) 200-300 mg Tab, Take 1 tablet by mouth once daily., Disp: 30 tablet, Rfl: 0    famotidine (PEPCID) 20 MG tablet, Take 1 tablet (20 mg total) by mouth 2 (two) times daily., Disp: 20 tablet, Rfl: 0    ipratropium (ATROVENT) 21 mcg (0.03 %) nasal spray, 2 sprays by Each Nostril route every 4 to 6 hours as needed for Rhinitis., Disp: 30 mL, Rfl: 6    mirtazapine (REMERON) 7.5 MG Tab, Take 1 tablet (7.5 mg total) by mouth every evening., Disp: 90 tablet, Rfl: 2    ondansetron (ZOFRAN-ODT) 8 MG TbDL, Take 1 tablet  (8 mg total) by mouth 3 (three) times daily as needed (nausea)., Disp: 20 tablet, Rfl: 0    sildenafiL (VIAGRA) 50 MG tablet, Take 1 tablet (50 mg total) by mouth daily as needed for Erectile Dysfunction., Disp: 15 tablet, Rfl: 9    triamcinolone acetonide 0.1% (KENALOG) 0.1 % cream, Apply topically 2 (two) times daily as needed (rash)., Disp: 80 g, Rfl: 3    albuterol (VENTOLIN HFA) 90 mcg/actuation inhaler, Inhale 2 puffs into the lungs every 6 (six) hours as needed for Wheezing. Rescue, Disp: 18 g, Rfl: 0    Current Facility-Administered Medications:     LIDOcaine HCL 10 mg/ml (1%) injection 3 mL, 3 mL, Other, Once, Vira Bautista, DO    Lab Results   Component Value Date    WBC 5.41 07/11/2023    HGB 15.1 07/11/2023    HCT 47.2 07/11/2023     07/11/2023    CHOL 177 06/08/2023    TRIG 191 (H) 06/08/2023    HDL 38 (L) 06/08/2023    ALT 36 07/11/2023    AST 24 07/11/2023     07/11/2023    K 4.0 07/11/2023     07/11/2023    CREATININE 1.3 07/11/2023    BUN 11 07/11/2023    CO2 24 07/11/2023    TSH 1.92 08/12/2008    PSA 0.65 06/08/2023    HGBA1C 5.7 (H) 06/08/2023       Review of Systems   Constitutional:  Negative for activity change, appetite change and fever.   HENT:  Negative for postnasal drip, rhinorrhea and sinus pressure.    Eyes:  Negative for visual disturbance.   Respiratory:  Negative for cough and shortness of breath.    Cardiovascular:  Negative for chest pain, palpitations and leg swelling.   Gastrointestinal:  Negative for abdominal distention, abdominal pain, constipation and diarrhea.   Genitourinary:  Negative for difficulty urinating, dysuria, penile discharge and penile pain.   Musculoskeletal:  Positive for arthralgias, back pain and neck pain. Negative for myalgias.   Neurological:  Positive for numbness. Negative for light-headedness and headaches.   Hematological:  Negative for adenopathy.   Psychiatric/Behavioral:  The patient is not nervous/anxious.         Objective:      Physical Exam  Constitutional:       Appearance: Normal appearance. He is normal weight.   HENT:      Head: Normocephalic and atraumatic.   Cardiovascular:      Rate and Rhythm: Normal rate and regular rhythm.      Heart sounds: Normal heart sounds.   Pulmonary:      Effort: Pulmonary effort is normal.      Breath sounds: Normal breath sounds.   Abdominal:      General: Abdomen is flat. Bowel sounds are normal.      Palpations: Abdomen is soft.   Musculoskeletal:      Right lower leg: No edema.      Left lower leg: No edema.      Comments: Positive SLR bilaterally  No significant discomfort with flexion or extension of the lumbar spine.    Skin:     General: Skin is warm and dry.   Neurological:      Mental Status: He is alert and oriented to person, place, and time.   Psychiatric:         Mood and Affect: Mood normal.         Behavior: Behavior normal.         Thought Content: Thought content normal.         Judgment: Judgment normal.         Assessment:       1. STD exposure        Plan:       Dustin was seen today for follow-up.    Diagnoses and all orders for this visit:    STD exposure  -     HIV 1/2 Ag/Ab (4th Gen); Future    Lumbar radiculopathy  -     Cancel: Ambulatory referral/consult to [facility]; Future  -     Ambulatory referral/consult to Pain Clinic; Future    Repeat labs in 4 weeks  Follow up PCP in June

## 2023-08-23 ENCOUNTER — PATIENT MESSAGE (OUTPATIENT)
Dept: FAMILY MEDICINE | Facility: CLINIC | Age: 68
End: 2023-08-23
Payer: MEDICARE

## 2023-09-15 ENCOUNTER — LAB VISIT (OUTPATIENT)
Dept: LAB | Facility: HOSPITAL | Age: 68
End: 2023-09-15
Attending: PHYSICIAN ASSISTANT
Payer: MEDICARE

## 2023-09-15 DIAGNOSIS — Z20.2 STD EXPOSURE: ICD-10-CM

## 2023-09-15 LAB — HIV 1+2 AB+HIV1 P24 AG SERPL QL IA: NORMAL

## 2023-09-15 PROCEDURE — 36415 COLL VENOUS BLD VENIPUNCTURE: CPT | Mod: PO | Performed by: PHYSICIAN ASSISTANT

## 2023-09-15 PROCEDURE — 87389 HIV-1 AG W/HIV-1&-2 AB AG IA: CPT | Performed by: PHYSICIAN ASSISTANT

## 2023-09-18 ENCOUNTER — OFFICE VISIT (OUTPATIENT)
Dept: PAIN MEDICINE | Facility: CLINIC | Age: 68
End: 2023-09-18
Payer: MEDICARE

## 2023-09-18 VITALS
HEIGHT: 69 IN | SYSTOLIC BLOOD PRESSURE: 114 MMHG | BODY MASS INDEX: 25.96 KG/M2 | WEIGHT: 175.25 LBS | HEART RATE: 80 BPM | DIASTOLIC BLOOD PRESSURE: 81 MMHG

## 2023-09-18 DIAGNOSIS — M47.816 LUMBAR SPONDYLOSIS: Primary | ICD-10-CM

## 2023-09-18 DIAGNOSIS — M54.9 DORSALGIA: ICD-10-CM

## 2023-09-18 PROCEDURE — 99213 OFFICE O/P EST LOW 20 MIN: CPT | Mod: PBBFAC,PN | Performed by: ANESTHESIOLOGY

## 2023-09-18 PROCEDURE — 99999 PR PBB SHADOW E&M-EST. PATIENT-LVL III: CPT | Mod: PBBFAC,,, | Performed by: ANESTHESIOLOGY

## 2023-09-18 PROCEDURE — 99204 PR OFFICE/OUTPT VISIT, NEW, LEVL IV, 45-59 MIN: ICD-10-PCS | Mod: S$PBB,,, | Performed by: ANESTHESIOLOGY

## 2023-09-18 PROCEDURE — 99999 PR PBB SHADOW E&M-EST. PATIENT-LVL III: ICD-10-PCS | Mod: PBBFAC,,, | Performed by: ANESTHESIOLOGY

## 2023-09-18 PROCEDURE — 99204 OFFICE O/P NEW MOD 45 MIN: CPT | Mod: S$PBB,,, | Performed by: ANESTHESIOLOGY

## 2023-09-18 NOTE — PROGRESS NOTES
[facility] New Patient Evaluation      Referred by: Shea Broussard    PCP:     CC:   Chief Complaint   Patient presents with    Low-back Pain    Leg Pain     Both     Foot Pain          9/18/2023     3:15 PM   Last 3 PDI Scores   Pain Disability Index (PDI) 16         HPI:   Dustin Owens is a 68 y.o. male patient who has a past medical history of Asthma, Colon polyp, and Personal history of colonic polyps. He presents with back pain.  Had back pain for the past 30 years.  He reports it started back when he was in the .  Today he reports his pain is intermittent, aching, sharp, tight in the axial lower back.  Occasionally can get a pain into his bilateral feet.  Pain is worse with morning, bending, getting out of a better chair and relieved with activity.      Pain Intervention History:      Past Spine Surgical History:      Past and current medications:  Antineuropathics:  NSAIDs:  Physical therapy: yes, completed   Antidepressants:  Muscle relaxers:  Opioids:  Antiplatelets/Anticoagulants:    History:    Current Outpatient Medications:     famotidine (PEPCID) 20 MG tablet, Take 1 tablet (20 mg total) by mouth 2 (two) times daily., Disp: 20 tablet, Rfl: 0    ipratropium (ATROVENT) 21 mcg (0.03 %) nasal spray, 2 sprays by Each Nostril route every 4 to 6 hours as needed for Rhinitis., Disp: 30 mL, Rfl: 6    mirtazapine (REMERON) 7.5 MG Tab, Take 1 tablet (7.5 mg total) by mouth every evening., Disp: 90 tablet, Rfl: 2    ondansetron (ZOFRAN-ODT) 8 MG TbDL, Take 1 tablet (8 mg total) by mouth 3 (three) times daily as needed (nausea)., Disp: 20 tablet, Rfl: 0    sildenafiL (VIAGRA) 50 MG tablet, Take 1 tablet (50 mg total) by mouth daily as needed for Erectile Dysfunction., Disp: 15 tablet, Rfl: 9    triamcinolone acetonide 0.1% (KENALOG) 0.1 % cream, Apply topically 2 (two) times daily as needed (rash)., Disp: 80 g, Rfl: 3    albuterol (VENTOLIN HFA) 90 mcg/actuation inhaler, Inhale 2 puffs  into the lungs every 6 (six) hours as needed for Wheezing. Rescue, Disp: 18 g, Rfl: 0    Current Facility-Administered Medications:     LIDOcaine HCL 10 mg/ml (1%) injection 3 mL, 3 mL, Other, Once, Vira Bautista DO    Past Medical History:   Diagnosis Date    Asthma     Colon polyp     Personal history of colonic polyps 07/11/2018       Past Surgical History:   Procedure Laterality Date    COLONOSCOPY      COLONOSCOPY N/A 3/27/2023    Procedure: COLONOSCOPY;  Surgeon: Arjun Ramos MD;  Location: [facility];  Service: Endoscopy;  Laterality: N/A;       Family History   Problem Relation Age of Onset    Cancer Mother     Diabetes Father     Colon cancer Neg Hx     Colon polyps Neg Hx     Crohn's disease Neg Hx     Ulcerative colitis Neg Hx     Esophageal cancer Neg Hx     Stomach cancer Neg Hx        Social History     Socioeconomic History    Marital status:    Tobacco Use    Smoking status: Former     Passive exposure: Past    Smokeless tobacco: Never   Substance and Sexual Activity    Alcohol use: Not Currently    Drug use: Never    Sexual activity: Yes     Partners: Female     Social Determinants of Health     Financial Resource Strain: Low Risk  (8/3/2023)    Overall Financial Resource Strain (CARDIA)     Difficulty of Paying Living Expenses: Not hard at all   Food Insecurity: No Food Insecurity (8/3/2023)    Hunger Vital Sign     Worried About Running Out of Food in the Last Year: Never true     Ran Out of Food in the Last Year: Never true   Transportation Needs: No Transportation Needs (8/3/2023)    PRAPARE - Transportation     Lack of Transportation (Medical): No     Lack of Transportation (Non-Medical): No   Stress: No Stress Concern Present (8/3/2023)    [employer] [place] [employer] - Occupational Stress Questionnaire     Feeling of Stress : Only a little   Social Connections: Unknown (8/3/2023)    Social Connection and Isolation Panel [NHANES]     Frequency of  "Communication with Friends and Family: Three times a week     Frequency of Social Gatherings with Friends and Family: Three times a week     Marital Status:    Housing Stability: Unknown (8/3/2023)    Housing Stability Vital Sign     Unable to Pay for Housing in the Last Year: No     Unstable Housing in the Last Year: No       Review of patient's allergies indicates:   Allergen Reactions    Amoxicillin Diarrhea    Pollen, micronized Other (See Comments)       Review of Systems:  12 point review of systems is negative.    Physical Exam:  Vitals:    09/18/23 1515   BP: 114/81   Pulse: 80   Weight: 79.5 kg (175 lb 4.3 oz)   Height: 5' 9" (1.753 m)   PainSc:   4   PainLoc: Back     Body mass index is 25.88 kg/m².    Gen: NAD  Psych: mood appropriate for given condition  HEENT: eyes anicteric   CV: RRR  HEENT: anicteric   Respiratory: non-labored, no signs of respiratory distress  Abd: non-distended  Skin: warm, dry and intact.  Gait: No antalgic gait.     Reproducible pain with lumbar axial facet loading    Sensory:  Intact and symmetrical to light touch in L1-S1 dermatomes bilaterally.    Motor:     Right Left   L2/3 Iliacus Hip flexion  5  5   L3/4 Qudratus Femoris Knee Extension  5  5   L4/5 Tib Anterior Ankle Dorsiflexion   5  5   L5/S1 Extensor Hallicus Longus Great toe extension  5  5   S1/S2 Gastroc/Soleus Plantar Flexion  5  5      Right Left                  Patellar DTR 1+ 1+   Achilles DTR 0 0                      Labs:  Lab Results   Component Value Date    HGBA1C 5.7 (H) 06/08/2023       Lab Results   Component Value Date    WBC 5.41 07/11/2023    HGB 15.1 07/11/2023    HCT 47.2 07/11/2023    MCV 86 07/11/2023     07/11/2023           Imaging:  MRI lumbar spine 1/21/21  FINDINGS:  This report assumes that there are 5 non-rib bearing lumbar vertebral bodies with the L5 vertebral body articulating with the sacrum.  Accurate numbering of the spine levels would require imaging of the " thoracolumbar spine to count ribs.     The prevertebral soft tissues are normal. Alignment is anatomic. Individual vertebral height is maintained. Marrow signal shows a diffusely heterogeneous somewhat mottled decreased T1 and T2 signal intensity. This is a somewhat nonspecific finding but one which may suggest red marrow reconversion as can be seen in chronic anemic states, hypoxia, obesity or smoking. Conus is normal in caliber and signal. The conus terminates at L1     At L1-2, the disc demonstrates mild disc height loss with minor bulging of the posterior annulus. There is mild facet arthropathy without ligamentum flavum hypertrophy. This results in mild bilateral neural foraminal stenosis without spinal canal stenosis.     At L2-3, the disc demonstrates relative normal disc height with minor bulging of the posterior annulus at the level the neural foramina. There is mild facet arthropathy without ligamentum flavum hypertrophy. This results in mild bilateral neural foraminal stenosis without spinal canal stenosis.     At L3-4, the disc demonstrates mild disc height loss with a small broad-based posterior disc bulge setting more eccentric to the neural foramina. There is mild facet arthropathy without ligamentum flavum hypertrophy. This results in mild bilateral neural foraminal stenosis without spinal canal stenosis.     At L4-5, demonstrates mild disc height loss with a small broad-based posterior disc bulge. There is moderate bilateral facet arthropathy without ligamentum flavum hypertrophy. This results in mild to moderate bilateral neural foraminal stenosis without spinal canal stenosis.     At L5-S1, the disc demonstrates moderate disc height loss with a small broad-based posterior disc bulge. There is moderate bilateral facet arthropathy without luminal flavum hypertrophy. These findings combined result in moderate bilateral neural foraminal stenosis without spinal canal stenosis.     The SI joints and  visualized pelvis are within normal limits.      Assessment:   Problem List Items Addressed This Visit    None  Visit Diagnoses       Lumbar spondylosis    -  Primary    Dorsalgia                  Dustin Owens is a 68 y.o. male patient who has a past medical history of Asthma, Colon polyp, and Personal history of colonic polyps. He presents with back pain.  Had back pain for the past 30 years.  He reports it started back when he was in the .  Today he reports his pain is intermittent, aching, sharp, tight in the axial lower back.  Occasionally can get a pain into his bilateral feet.  Pain is worse with morning, bending, getting out of a better chair and relieved with activity.    - on exam he has full strength in his lower extremities.  He has reproducible pain with lumbar axial facet loading  - I independently reviewed his lumbar MRI.  He has multilevel bilateral facet arthropathy worse at L4-5 and L5-S1.  He has moderate bilateral foraminal narrowing at L5-S1  - he has completed formal physical therapy.  He reports when he participates in PT and PT directed home exercises his pain resolves.  - I think his pain is secondary to lumbar spondylosis.  At this point I have recommended that he restart his home PT regimen as he gets very good results with this.  He is in agreement with maintaining conservative measures.  I discussed if his pain should persist despite PT exercises then I would recommend diagnostic lumbar medial branch blocks to try and target is lumbar spondylosis.  He is going to follow up with me on an as needed basis if he should failed to get relief with conservative treatment      : Not applicable    Julien Kebede M.D.  Interventional Pain Medicine / Anesthesiology    This note was completed with dictation software and grammatical errors may exist.

## 2023-09-23 ENCOUNTER — IMMUNIZATION (OUTPATIENT)
Dept: FAMILY MEDICINE | Facility: CLINIC | Age: 68
End: 2023-09-23
Payer: MEDICARE

## 2023-09-23 PROCEDURE — G0008 ADMIN INFLUENZA VIRUS VAC: HCPCS | Mod: PBBFAC,PO

## 2023-09-23 PROCEDURE — 99999PBSHW FLU VACCINE - QUADRIVALENT - ADJUVANTED: ICD-10-PCS | Mod: PBBFAC,,,

## 2023-09-23 PROCEDURE — 99999PBSHW FLU VACCINE - QUADRIVALENT - ADJUVANTED: Mod: PBBFAC,,,

## 2023-11-21 DIAGNOSIS — J34.89 RHINORRHEA: ICD-10-CM

## 2023-11-21 DIAGNOSIS — R21 RASH: ICD-10-CM

## 2023-11-21 RX ORDER — SILDENAFIL 50 MG/1
50 TABLET, FILM COATED ORAL DAILY PRN
Qty: 15 TABLET | Refills: 11 | Status: SHIPPED | OUTPATIENT
Start: 2023-11-21 | End: 2024-03-19

## 2023-11-21 RX ORDER — TRIAMCINOLONE ACETONIDE 1 MG/G
CREAM TOPICAL 2 TIMES DAILY PRN
Qty: 80 G | Refills: 9 | Status: SHIPPED | OUTPATIENT
Start: 2023-11-21 | End: 2024-03-01 | Stop reason: SDUPTHER

## 2023-11-21 NOTE — TELEPHONE ENCOUNTER
No care due was identified.  [facility] Embedded Care Due Messages. Reference number: [id number].   11/21/2023 1:49:03 AM CST

## 2023-11-27 RX ORDER — IPRATROPIUM BROMIDE 21 UG/1
2 SPRAY, METERED NASAL
Qty: 30 ML | Refills: 6 | Status: SHIPPED | OUTPATIENT
Start: 2023-11-27

## 2024-02-27 DIAGNOSIS — Z00.00 ENCOUNTER FOR MEDICARE ANNUAL WELLNESS EXAM: ICD-10-CM

## 2024-03-01 ENCOUNTER — OFFICE VISIT (OUTPATIENT)
Dept: FAMILY MEDICINE | Facility: CLINIC | Age: 69
End: 2024-03-01
Payer: MEDICARE

## 2024-03-01 VITALS
SYSTOLIC BLOOD PRESSURE: 132 MMHG | HEART RATE: 76 BPM | HEIGHT: 69 IN | WEIGHT: 172.19 LBS | RESPIRATION RATE: 16 BRPM | BODY MASS INDEX: 25.5 KG/M2 | DIASTOLIC BLOOD PRESSURE: 80 MMHG | TEMPERATURE: 99 F | OXYGEN SATURATION: 98 %

## 2024-03-01 DIAGNOSIS — L30.9 ECZEMA, UNSPECIFIED TYPE: ICD-10-CM

## 2024-03-01 DIAGNOSIS — R21 RASH: Primary | ICD-10-CM

## 2024-03-01 PROCEDURE — 99999 PR PBB SHADOW E&M-EST. PATIENT-LVL IV: CPT | Mod: PBBFAC,,,

## 2024-03-01 PROCEDURE — 99214 OFFICE O/P EST MOD 30 MIN: CPT | Mod: PBBFAC,PO

## 2024-03-01 PROCEDURE — 99213 OFFICE O/P EST LOW 20 MIN: CPT | Mod: S$PBB,,,

## 2024-03-01 RX ORDER — TRIAMCINOLONE ACETONIDE 1 MG/G
CREAM TOPICAL 2 TIMES DAILY PRN
Qty: 80 G | Refills: 9 | Status: ON HOLD | OUTPATIENT
Start: 2024-03-01 | End: 2024-05-01 | Stop reason: CLARIF

## 2024-03-01 RX ORDER — LEVOCETIRIZINE DIHYDROCHLORIDE 5 MG/1
5 TABLET, FILM COATED ORAL NIGHTLY
Qty: 30 TABLET | Refills: 3 | Status: SHIPPED | OUTPATIENT
Start: 2024-03-01

## 2024-03-01 NOTE — PATIENT INSTRUCTIONS

## 2024-03-01 NOTE — PROGRESS NOTES
Subjective:       Patient ID: Dustin Owens is a 68 y.o. male.    Chief Complaint: Rash    Patient presents to the clinic with complaint of rash.     States rash has been present on and of for years. States rash will come and go to chest, back, and arms. Rash worsens in the heat. Reports itching. Has tried steroid cream without relief.     Patient educated on plan of care, verbalized understanding.      Rash  This is a recurrent problem. The current episode started more than 1 year ago. The problem has been waxing and waning since onset. The affected locations include the neck, chest, torso and back. The rash is characterized by redness, swelling and itchiness. Pertinent negatives include no congestion, cough, diarrhea, eye pain, fever, shortness of breath, sore throat or vomiting. Past treatments include topical steroids. The treatment provided mild relief.     Review of Systems   Constitutional:  Negative for activity change, appetite change, chills, diaphoresis and fever.   HENT:  Negative for congestion, ear pain, postnasal drip, sinus pressure, sneezing and sore throat.    Eyes:  Negative for pain, discharge, redness and itching.   Respiratory:  Negative for apnea, cough, chest tightness, shortness of breath and wheezing.    Cardiovascular:  Negative for chest pain and leg swelling.   Gastrointestinal:  Negative for abdominal distention, abdominal pain, constipation, diarrhea, nausea and vomiting.   Genitourinary:  Negative for difficulty urinating, dysuria, flank pain and frequency.   Skin:  Positive for rash. Negative for color change and wound.   Neurological:  Negative for dizziness.   All other systems reviewed and are negative.      Patient Active Problem List   Diagnosis    Moderate episode of recurrent major depressive disorder    Insomnia    Hyperlipidemia    Non-seasonal allergic rhinitis due to pollen    Anxiety    Cervical disc disorder with radiculopathy    Posture abnormality    Weakness of  both upper extremities    Decreased ROM of intervertebral discs of cervical spine       Objective:      Physical Exam  Vitals and nursing note reviewed.   Constitutional:       Appearance: Normal appearance. He is not ill-appearing.   HENT:      Head: Normocephalic and atraumatic.      Nose: Nose normal.   Eyes:      General: Lids are normal.   Cardiovascular:      Rate and Rhythm: Normal rate.      Pulses: Normal pulses.   Pulmonary:      Effort: Pulmonary effort is normal. No tachypnea or respiratory distress.      Breath sounds: No wheezing.   Musculoskeletal:         General: Normal range of motion.      Cervical back: Full passive range of motion without pain and normal range of motion.      Left lower leg: No edema.   Skin:     General: Skin is warm and dry.      Comments: No rash noted today, dry skin noted to upper back   Neurological:      Mental Status: He is alert and oriented to person, place, and time.   Psychiatric:         Mood and Affect: Mood normal.         Behavior: Behavior normal.         Lab Results   Component Value Date    WBC 5.41 07/11/2023    HGB 15.1 07/11/2023    HCT 47.2 07/11/2023     07/11/2023    CHOL 177 06/08/2023    TRIG 191 (H) 06/08/2023    HDL 38 (L) 06/08/2023    ALT 36 07/11/2023    AST 24 07/11/2023     07/11/2023    K 4.0 07/11/2023     07/11/2023    CREATININE 1.3 07/11/2023    BUN 11 07/11/2023    CO2 24 07/11/2023    TSH 1.92 08/12/2008    PSA 0.65 06/08/2023    HGBA1C 5.7 (H) 06/08/2023     The 10-year ASCVD risk score (Rayshawn NAVA, et al., 2019) is: 11.3%    Values used to calculate the score:      Age: 68 years      Sex: Male      Is Non- : Yes      Diabetic: No      Tobacco smoker: No      Systolic Blood Pressure: 132 mmHg      Is BP treated: No      HDL Cholesterol: 38 mg/dL      Total Cholesterol: 177 mg/dL  Visit Vitals  /80 (BP Location: Right arm, Patient Position: Sitting, BP Method: Small (Manual))   Pulse 76  "  Temp 98.6 °F (37 °C) (Oral)   Resp 16   Ht 5' 9" (1.753 m)   Wt 78.1 kg (172 lb 2.9 oz)   SpO2 98%   BMI 25.43 kg/m²      Assessment:       1. Rash    2. Eczema, unspecified type        Plan:       1. Rash  -     levocetirizine (XYZAL) 5 MG tablet; Take 1 tablet (5 mg total) by mouth every evening.  Dispense: 30 tablet; Refill: 3  -     triamcinolone acetonide 0.1% (KENALOG) 0.1 % cream; Apply topically 2 (two) times daily as needed (rash).  Dispense: 80 g; Refill: 9  -     Ambulatory referral/consult to Dermatology; Future; Expected date: 03/08/2024    2. Eczema, unspecified type  -     levocetirizine (XYZAL) 5 MG tablet; Take 1 tablet (5 mg total) by mouth every evening.  Dispense: 30 tablet; Refill: 3       Follow up if symptoms worsen or fail to improve.      Future Appointments       Date Provider Specialty Appt Notes    6/10/2024 Jimbo Howe MD Family Medicine Annual             "

## 2024-03-19 ENCOUNTER — OFFICE VISIT (OUTPATIENT)
Dept: ORTHOPEDICS | Facility: CLINIC | Age: 69
End: 2024-03-19
Payer: MEDICARE

## 2024-03-19 ENCOUNTER — HOSPITAL ENCOUNTER (OUTPATIENT)
Dept: RADIOLOGY | Facility: CLINIC | Age: 69
Discharge: HOME | End: 2024-03-19
Attending: PHYSICIAN ASSISTANT
Payer: MEDICARE

## 2024-03-19 ENCOUNTER — OFFICE VISIT (OUTPATIENT)
Dept: FAMILY MEDICINE | Facility: CLINIC | Age: 69
End: 2024-03-19
Payer: MEDICARE

## 2024-03-19 VITALS
TEMPERATURE: 98 F | OXYGEN SATURATION: 100 % | DIASTOLIC BLOOD PRESSURE: 82 MMHG | HEIGHT: 69 IN | SYSTOLIC BLOOD PRESSURE: 134 MMHG | WEIGHT: 177 LBS | HEART RATE: 77 BPM | RESPIRATION RATE: 16 BRPM | BODY MASS INDEX: 26.22 KG/M2

## 2024-03-19 VITALS — BODY MASS INDEX: 26.22 KG/M2 | HEIGHT: 69 IN | RESPIRATION RATE: 18 BRPM | WEIGHT: 177 LBS

## 2024-03-19 DIAGNOSIS — M25.612 DECREASED ROM OF LEFT SHOULDER: ICD-10-CM

## 2024-03-19 DIAGNOSIS — M25.512 LEFT SHOULDER PAIN, UNSPECIFIED CHRONICITY: ICD-10-CM

## 2024-03-19 DIAGNOSIS — M25.512 LEFT SHOULDER PAIN, UNSPECIFIED CHRONICITY: Primary | ICD-10-CM

## 2024-03-19 PROCEDURE — 73030 X-RAY EXAM OF SHOULDER: CPT | Mod: TC,FY,PO,LT

## 2024-03-19 PROCEDURE — 99999 PR PBB SHADOW E&M-EST. PATIENT-LVL IV: CPT | Mod: PBBFAC,,, | Performed by: ORTHOPAEDIC SURGERY

## 2024-03-19 PROCEDURE — 99214 OFFICE O/P EST MOD 30 MIN: CPT | Mod: PBBFAC,25,27,PO | Performed by: ORTHOPAEDIC SURGERY

## 2024-03-19 PROCEDURE — 99213 OFFICE O/P EST LOW 20 MIN: CPT | Mod: S$PBB,,, | Performed by: PHYSICIAN ASSISTANT

## 2024-03-19 PROCEDURE — 99999 PR PBB SHADOW E&M-EST. PATIENT-LVL IV: CPT | Mod: PBBFAC,,, | Performed by: PHYSICIAN ASSISTANT

## 2024-03-19 PROCEDURE — 73030 X-RAY EXAM OF SHOULDER: CPT | Mod: 26,LT,S$GLB, | Performed by: RADIOLOGY

## 2024-03-19 PROCEDURE — 99214 OFFICE O/P EST MOD 30 MIN: CPT | Mod: PBBFAC,25,PO | Performed by: PHYSICIAN ASSISTANT

## 2024-03-19 PROCEDURE — 99204 OFFICE O/P NEW MOD 45 MIN: CPT | Mod: S$PBB,,, | Performed by: ORTHOPAEDIC SURGERY

## 2024-03-19 RX ORDER — ATORVASTATIN CALCIUM 10 MG/1
1 TABLET, FILM COATED ORAL DAILY
COMMUNITY

## 2024-03-19 NOTE — PROGRESS NOTES
Patient ID: Dustin Owens is a 68 y.o. male    Chief Complaint:   Chief Complaint   Patient presents with    Left Shoulder - Pain       History of Present Illness:    68-year-old male here for evaluation of left shoulder pain.  Reports 3 or 4 week history of left shoulder pain and difficulty with overhead activity.  No history of diabetes.  No known trauma or falls.  Pain is global in the left shoulder and worse with overhead activity.  He reports pain mostly with overhead motion and nighttime symptoms.  No previous surgeries to left shoulder.  History of right shoulder pain in the past treated conservatively.    PAST MEDICAL HISTORY:   Past Medical History:   Diagnosis Date    Asthma     Colon polyp     Personal history of colonic polyps 07/11/2018     PAST SURGICAL HISTORY:   Past Surgical History:   Procedure Laterality Date    COLONOSCOPY      COLONOSCOPY N/A 3/27/2023    Procedure: COLONOSCOPY;  Surgeon: Arjun Ramos MD;  Location: [facility];  Service: Endoscopy;  Laterality: N/A;     FAMILY HISTORY:   Family History   Problem Relation Age of Onset    Cancer Mother     Diabetes Father     Colon cancer Neg Hx     Colon polyps Neg Hx     Crohn's disease Neg Hx     Ulcerative colitis Neg Hx     Esophageal cancer Neg Hx     Stomach cancer Neg Hx      SOCIAL HISTORY:   Social History     Occupational History    Not on file   Tobacco Use    Smoking status: Former     Passive exposure: Past    Smokeless tobacco: Never   Substance and Sexual Activity    Alcohol use: Not Currently    Drug use: Never    Sexual activity: Yes     Partners: Female        MEDICATIONS:   Current Outpatient Medications:     albuterol (VENTOLIN HFA) 90 mcg/actuation inhaler, Inhale 2 puffs into the lungs every 6 (six) hours as needed for Wheezing. Rescue, Disp: 18 g, Rfl: 0    atorvastatin (LIPITOR) 10 MG tablet, Take 1 tablet by mouth once daily., Disp: , Rfl:     ipratropium (ATROVENT) 21 mcg (0.03 %) nasal spray, 2 sprays by Each  Nostril route every 4 to 6 hours as needed for Rhinitis., Disp: 30 mL, Rfl: 6    levocetirizine (XYZAL) 5 MG tablet, Take 1 tablet (5 mg total) by mouth every evening., Disp: 30 tablet, Rfl: 3    triamcinolone acetonide 0.1% (KENALOG) 0.1 % cream, Apply topically 2 (two) times daily as needed (rash)., Disp: 80 g, Rfl: 9    Current Facility-Administered Medications:     LIDOcaine HCL 10 mg/ml (1%) injection 3 mL, 3 mL, Other, Once, Vira Bautista,   ALLERGIES:   Review of patient's allergies indicates:   Allergen Reactions    Amoxicillin Diarrhea    Pollen, micronized Other (See Comments)         Physical Exam     Vitals:    03/19/24 1006   Resp: 18     Alert and oriented to person, place and time. No acute distress. Well-groomed, not ill appearing. Pupils round and reactive, normal respiratory effort, no audible wheezing.     Shoulder  Exam    OBSERVATION:     Swelling  none     Discoloration  none      Scars   none     Deformity  None    TENDERNESS                Clavicle   negative         AC Jt.    negative               Acromion:  negative        Scapular Spine negative   Supraspinatus  positive       Infraspinatus  negative   LH Biceps   negative   Greater Tub.  Positive        ROM:               Forward Flexion  150°  with pain     ER at 0°    60°        ER at 90° ABD  90°       IR at 90°  ABD   NA         IR (spine level)   L2        STRENGTH:       SCAPTION   4+/5        IR    5/5       ER    5/5       BICEPS   4+/5            SIGNS:          NEER   +     WALTON   +        DROP ARM   Neg   GABRIEL's  +    BELLY PRESS Neg   O'SARA's  Neg    SPEEDs  +   LIFT-OFF  Neg   X-Body ADD    Neg              Imaging:       X-Ray: I have reviewed all pertinent results/findings and my personal findings are:  Negative left shoulder radiographs.  No evidence of fracture or dislocation.  Joint space is well-maintained.      Assessment & Plan    Left shoulder pain, unspecified chronicity  -     Ambulatory  referral/consult to Orthopedics  -     MRI Shoulder Without Contrast Left; Future; Expected date: 03/19/2024    Decreased ROM of left shoulder  -     Ambulatory referral/consult to Orthopedics  -     MRI Shoulder Without Contrast Left; Future; Expected date: 03/19/2024         Treatment options were discussed with the patient in detail. We discussed the patient's current imaging as well as differential diagnosis in detail. Based on the patient's symptoms of concern for rotator cuff injury, I do believe an MRI of the Left Shoulder is indicated to rule out rotator cuff tear, adhesive capsulitis, biceps/labral complex injury     The patient will follow-up after MRI to discuss results and further treatment options. They will call the clinic with any questions or concerns.     Follow up: for MRI/CT Results   X-rays next visit:  None

## 2024-03-19 NOTE — PROGRESS NOTES
Subjective:       Patient ID: Dustin Owens is a 68 y.o. male.    Chief Complaint: Arm Pain    67 yo male presented with a constant L shoulder pain x 3 weeks. Pt stated that the pain is aggravated by movements. Pt has a hard time putting on his shirt due to the pain.  Pt stated that he hears popping. Pt stated that the pain does not radiate anywhere. Pt has tried OTC pain patch and tylenol with little relief. Pt denied fever, swelling, skin changes, numbness, tingling, locking, trauma, lifting heavy objects and no TTP.   Review of patient's allergies indicates:   Allergen Reactions    Amoxicillin Diarrhea    Pollen, micronized Other (See Comments)         Current Outpatient Medications:     albuterol (VENTOLIN HFA) 90 mcg/actuation inhaler, Inhale 2 puffs into the lungs every 6 (six) hours as needed for Wheezing. Rescue, Disp: 18 g, Rfl: 0    ipratropium (ATROVENT) 21 mcg (0.03 %) nasal spray, 2 sprays by Each Nostril route every 4 to 6 hours as needed for Rhinitis., Disp: 30 mL, Rfl: 6    levocetirizine (XYZAL) 5 MG tablet, Take 1 tablet (5 mg total) by mouth every evening., Disp: 30 tablet, Rfl: 3    atorvastatin (LIPITOR) 10 MG tablet, Take 1 tablet by mouth once daily., Disp: , Rfl:     famotidine (PEPCID) 20 MG tablet, Take 1 tablet (20 mg total) by mouth 2 (two) times daily. (Patient not taking: Reported on 3/1/2024), Disp: 20 tablet, Rfl: 0    mirtazapine (REMERON) 7.5 MG Tab, Take 1 tablet (7.5 mg total) by mouth every evening. (Patient not taking: Reported on 3/19/2024), Disp: 90 tablet, Rfl: 2    ondansetron (ZOFRAN-ODT) 8 MG TbDL, Take 1 tablet (8 mg total) by mouth 3 (three) times daily as needed (nausea). (Patient not taking: Reported on 3/19/2024), Disp: 20 tablet, Rfl: 0    sildenafiL (VIAGRA) 50 MG tablet, Take 1 tablet (50 mg total) by mouth daily as needed for Erectile Dysfunction. (Patient not taking: Reported on 3/1/2024), Disp: 15 tablet, Rfl: 11    triamcinolone acetonide 0.1% (KENALOG) 0.1  % cream, Apply topically 2 (two) times daily as needed (rash). (Patient not taking: Reported on 3/19/2024), Disp: 80 g, Rfl: 9    Current Facility-Administered Medications:     LIDOcaine HCL 10 mg/ml (1%) injection 3 mL, 3 mL, Other, Once, Sharievatena Vira Jemima, DO    Lab Results   Component Value Date    WBC 5.41 07/11/2023    HGB 15.1 07/11/2023    HCT 47.2 07/11/2023     07/11/2023    CHOL 177 06/08/2023    TRIG 191 (H) 06/08/2023    HDL 38 (L) 06/08/2023    ALT 36 07/11/2023    AST 24 07/11/2023     07/11/2023    K 4.0 07/11/2023     07/11/2023    CREATININE 1.3 07/11/2023    BUN 11 07/11/2023    CO2 24 07/11/2023    TSH 1.92 08/12/2008    PSA 0.65 06/08/2023    HGBA1C 5.7 (H) 06/08/2023       Review of Systems   Constitutional:  Negative for fatigue and fever.   Respiratory:  Negative for shortness of breath.    Gastrointestinal:  Negative for abdominal pain, diarrhea, nausea and vomiting.   Musculoskeletal:  Positive for arthralgias and myalgias. Negative for back pain, gait problem, joint swelling, neck pain and neck stiffness.   Skin: Negative.      Objective:      Physical Exam  Constitutional:       Appearance: Normal appearance.   HENT:      Head: Normocephalic and atraumatic.      Right Ear: External ear normal.      Left Ear: External ear normal.      Nose: Nose normal.   Eyes:      Conjunctiva/sclera: Conjunctivae normal.   Cardiovascular:      Rate and Rhythm: Normal rate and regular rhythm.      Pulses: Normal pulses.      Heart sounds: Normal heart sounds.   Pulmonary:      Effort: Pulmonary effort is normal.      Breath sounds: Normal breath sounds.   Abdominal:      General: Bowel sounds are normal.      Palpations: Abdomen is soft.   Musculoskeletal:         General: No swelling or tenderness.      Comments: Left shoulder, no TTP  Discomfort with abduction against resistance and external rotation.  Discomfort with raising left arm above shoulder height  Positive empty can test  on right side.    Skin:     General: Skin is warm.      Capillary Refill: Capillary refill takes less than 2 seconds.   Neurological:      Mental Status: He is alert and oriented to person, place, and time.         Assessment:       1. Left shoulder pain, unspecified chronicity    2. Decreased ROM of left shoulder        Plan:       Dustin was seen today for arm pain.    Diagnoses and all orders for this visit:    Left shoulder pain, unspecified chronicity  -     Ambulatory referral/consult to Orthopedics; Future  -     X-Ray Shoulder 2 or More Views Left; Future    Decreased ROM of left shoulder  -     Ambulatory referral/consult to Orthopedics; Future  -     X-Ray Shoulder 2 or More Views Left; Future       Will reach out to patient once results come back and make new recommendations at that time.

## 2024-03-27 ENCOUNTER — TELEPHONE (OUTPATIENT)
Dept: ADMINISTRATIVE | Facility: CLINIC | Age: 69
End: 2024-03-27
Payer: MEDICARE

## 2024-03-27 ENCOUNTER — HOSPITAL ENCOUNTER (OUTPATIENT)
Dept: RADIOLOGY | Facility: HOSPITAL | Age: 69
Discharge: HOME | End: 2024-03-27
Attending: ORTHOPAEDIC SURGERY
Payer: MEDICARE

## 2024-03-27 DIAGNOSIS — M25.512 LEFT SHOULDER PAIN, UNSPECIFIED CHRONICITY: ICD-10-CM

## 2024-03-27 DIAGNOSIS — M25.612 DECREASED ROM OF LEFT SHOULDER: ICD-10-CM

## 2024-03-27 PROCEDURE — 73221 MRI JOINT UPR EXTREM W/O DYE: CPT | Mod: 26,LT,, | Performed by: RADIOLOGY

## 2024-03-27 PROCEDURE — 73221 MRI JOINT UPR EXTREM W/O DYE: CPT | Mod: TC,LT

## 2024-03-28 ENCOUNTER — OFFICE VISIT (OUTPATIENT)
Dept: FAMILY MEDICINE | Facility: CLINIC | Age: 69
End: 2024-03-28
Payer: MEDICARE

## 2024-03-28 VITALS
HEART RATE: 71 BPM | DIASTOLIC BLOOD PRESSURE: 68 MMHG | SYSTOLIC BLOOD PRESSURE: 124 MMHG | BODY MASS INDEX: 26.09 KG/M2 | WEIGHT: 176.13 LBS | OXYGEN SATURATION: 99 % | HEIGHT: 69 IN

## 2024-03-28 DIAGNOSIS — J30.1 NON-SEASONAL ALLERGIC RHINITIS DUE TO POLLEN: ICD-10-CM

## 2024-03-28 DIAGNOSIS — F33.1 MODERATE EPISODE OF RECURRENT MAJOR DEPRESSIVE DISORDER: ICD-10-CM

## 2024-03-28 DIAGNOSIS — E78.5 HYPERLIPIDEMIA, UNSPECIFIED HYPERLIPIDEMIA TYPE: ICD-10-CM

## 2024-03-28 DIAGNOSIS — Z00.00 ENCOUNTER FOR MEDICARE ANNUAL WELLNESS EXAM: Primary | ICD-10-CM

## 2024-03-28 PROCEDURE — G0439 PPPS, SUBSEQ VISIT: HCPCS | Mod: ,,, | Performed by: NURSE PRACTITIONER

## 2024-03-28 PROCEDURE — 99214 OFFICE O/P EST MOD 30 MIN: CPT | Mod: PBBFAC,PO | Performed by: NURSE PRACTITIONER

## 2024-03-28 PROCEDURE — 99999 PR PBB SHADOW E&M-EST. PATIENT-LVL IV: CPT | Mod: PBBFAC,,, | Performed by: NURSE PRACTITIONER

## 2024-03-28 NOTE — PATIENT INSTRUCTIONS
Counseling and Referral of Other Preventative  (Italic type indicates deductible and co-insurance are waived)    Patient Name: Dustin Owens  Today's Date: 3/28/2024    Health Maintenance       Date Due Completion Date    RSV Vaccine (Age 60+ and Pregnant patients) (1 - 1-dose 60+ series) Never done ---    PROSTATE-SPECIFIC ANTIGEN 06/08/2024 6/8/2023    Override on 5/14/2021: Done    Hemoglobin A1c (Diabetic Prevention Screening) 06/08/2026 6/8/2023    TETANUS VACCINE 09/22/2027 9/22/2017    Colorectal Cancer Screening 03/27/2028 3/27/2023    Lipid Panel 06/08/2028 6/8/2023    Override on 5/14/2021: Done        No orders of the defined types were placed in this encounter.      The following information is provided to all patients.  This information is to help you find resources for any of the problems found today that may be affecting your health:                  Living healthy guide: www.[facility].[place].[facility]      Understanding Diabetes: www.diabetes.org      Eating healthy: www.cdc.gov/healthyweight      CDC home safety checklist: www.cdc.gov/steadi/patient.html      Agency on Aging: www.goea.[place].[facility]      Alcoholics anonymous (AA): www.aa.org      Physical Activity: www.esther.nih.gov/fn4zfmf      Tobacco use: www.quitwithusla.org

## 2024-04-02 ENCOUNTER — OFFICE VISIT (OUTPATIENT)
Dept: ORTHOPEDICS | Facility: CLINIC | Age: 69
End: 2024-04-02
Payer: MEDICARE

## 2024-04-02 VITALS — WEIGHT: 176.13 LBS | HEIGHT: 69 IN | BODY MASS INDEX: 26.09 KG/M2

## 2024-04-02 DIAGNOSIS — M25.512 LEFT SHOULDER PAIN, UNSPECIFIED CHRONICITY: Primary | ICD-10-CM

## 2024-04-02 PROCEDURE — 99999PBSHW PR PBB SHADOW TECHNICAL ONLY FILED TO HB: Mod: PBBFAC,,,

## 2024-04-02 PROCEDURE — 99214 OFFICE O/P EST MOD 30 MIN: CPT | Mod: 25,S$PBB,, | Performed by: ORTHOPAEDIC SURGERY

## 2024-04-02 PROCEDURE — 99999 PR PBB SHADOW E&M-EST. PATIENT-LVL III: CPT | Mod: PBBFAC,,, | Performed by: ORTHOPAEDIC SURGERY

## 2024-04-02 PROCEDURE — 99213 OFFICE O/P EST LOW 20 MIN: CPT | Mod: PBBFAC,PO | Performed by: ORTHOPAEDIC SURGERY

## 2024-04-02 PROCEDURE — 20610 DRAIN/INJ JOINT/BURSA W/O US: CPT | Mod: PBBFAC,PO,LT | Performed by: ORTHOPAEDIC SURGERY

## 2024-04-02 RX ORDER — TRIAMCINOLONE ACETONIDE 40 MG/ML
40 INJECTION, SUSPENSION INTRA-ARTICULAR; INTRAMUSCULAR
Status: DISCONTINUED | OUTPATIENT
Start: 2024-04-02 | End: 2024-04-02 | Stop reason: HOSPADM

## 2024-04-02 RX ADMIN — TRIAMCINOLONE ACETONIDE 40 MG: 40 INJECTION, SUSPENSION INTRA-ARTICULAR; INTRAMUSCULAR at 02:04

## 2024-04-02 NOTE — PROCEDURES
Large Joint Aspiration/Injection: L subacromial bursa    Date/Time: 4/2/2024 2:15 PM    Performed by: Philipp Chavez MD  Authorized by: Philipp Chavez MD    Consent Done?:  Yes (Verbal)  Indications:  Pain  Site marked: the procedure site was marked    Timeout: prior to procedure the correct patient, procedure, and site was verified    Local anesthetic:  Lidocaine 1% without epinephrine  Anesthetic total (ml):  3      Details:  Needle Size:  22 G  Ultrasonic Guidance for needle placement?: No    Approach:  Posterior  Location:  Shoulder  Site:  L subacromial bursa  Medications:  40 mg triamcinolone acetonide 40 mg/mL  Patient tolerance:  Patient tolerated the procedure well with no immediate complications

## 2024-04-02 NOTE — PROGRESS NOTES
Patient ID: Dustin Owens is a 68 y.o. male    Chief Complaint:   Chief Complaint   Patient presents with    Left Shoulder - Pain, Results, Follow-up     MRI review of left shoulder without any improvement since onset. PL 8/10 with use of left arm.        History of Present Illness:    68-year-old male here for evaluation of left shoulder pain.  Reports 3 or 4 week history of left shoulder pain and difficulty with overhead activity.  No history of diabetes.  No known trauma or falls.  Pain is global in the left shoulder and worse with overhead activity.  He reports pain mostly with overhead motion and nighttime symptoms.  No previous surgeries to left shoulder.  History of right shoulder pain in the past treated conservatively.    ____________________________________________________________________    Interval history 04/02/2024 : Patient returns today for MRI follow-up of their left shoulder.  They report no changes since I saw them last.  Pain 8/10.    PAST MEDICAL HISTORY:   Past Medical History:   Diagnosis Date    Asthma     Colon polyp     Personal history of colonic polyps 07/11/2018     PAST SURGICAL HISTORY:   Past Surgical History:   Procedure Laterality Date    COLONOSCOPY      COLONOSCOPY N/A 3/27/2023    Procedure: COLONOSCOPY;  Surgeon: Arjun Ramos MD;  Location: [facility];  Service: Endoscopy;  Laterality: N/A;     FAMILY HISTORY:   Family History   Problem Relation Age of Onset    Cancer Mother     Diabetes Father     Colon cancer Neg Hx     Colon polyps Neg Hx     Crohn's disease Neg Hx     Ulcerative colitis Neg Hx     Esophageal cancer Neg Hx     Stomach cancer Neg Hx      SOCIAL HISTORY:   Social History     Occupational History    Not on file   Tobacco Use    Smoking status: Former     Passive exposure: Past    Smokeless tobacco: Never   Substance and Sexual Activity    Alcohol use: Not Currently    Drug use: Never    Sexual activity: Yes     Partners: Female        MEDICATIONS:    Current Outpatient Medications:     albuterol (VENTOLIN HFA) 90 mcg/actuation inhaler, Inhale 2 puffs into the lungs every 6 (six) hours as needed for Wheezing. Rescue, Disp: 18 g, Rfl: 0    atorvastatin (LIPITOR) 10 MG tablet, Take 1 tablet by mouth once daily., Disp: , Rfl:     ipratropium (ATROVENT) 21 mcg (0.03 %) nasal spray, 2 sprays by Each Nostril route every 4 to 6 hours as needed for Rhinitis., Disp: 30 mL, Rfl: 6    levocetirizine (XYZAL) 5 MG tablet, Take 1 tablet (5 mg total) by mouth every evening., Disp: 30 tablet, Rfl: 3    triamcinolone acetonide 0.1% (KENALOG) 0.1 % cream, Apply topically 2 (two) times daily as needed (rash)., Disp: 80 g, Rfl: 9    Current Facility-Administered Medications:     LIDOcaine HCL 10 mg/ml (1%) injection 3 mL, 3 mL, Other, Once, Vira Bautista,   ALLERGIES:   Review of patient's allergies indicates:   Allergen Reactions    Amoxicillin Diarrhea    Pollen, micronized Other (See Comments)         Physical Exam     There were no vitals filed for this visit.    Alert and oriented to person, place and time. No acute distress. Well-groomed, not ill appearing. Pupils round and reactive, normal respiratory effort, no audible wheezing.     Shoulder  Exam    OBSERVATION:     Swelling  none     Discoloration  none      Scars   none     Deformity  None    TENDERNESS                Clavicle   negative         AC Jt.    negative               Acromion:  negative        Scapular Spine negative   Supraspinatus  positive       Infraspinatus  negative   LH Biceps   negative   Greater Tub.  Positive        ROM:               Forward Flexion  150°  with pain     ER at 0°    60°        ER at 90° ABD  90°       IR at 90°  ABD   NA         IR (spine level)   L2        STRENGTH:       SCAPTION   4+/5        IR    5/5       ER    5/5       BICEPS   4+/5            SIGNS:          NEER   +     WALTON   +        DROP ARM   Neg   GABRIEL's  +    BELLY  PRESS Neg   O'SARA's  Neg    SPEEDs  +   LIFT-OFF  Neg   X-Body ADD    Neg              Imaging:       X-Ray: I have reviewed all pertinent results/findings and my personal findings are:  Negative left shoulder radiographs.  No evidence of fracture or dislocation.  Joint space is well-maintained.    MRI left shoulder reviewed by me showing no full-thickness tear.  There is small partial-thickness undersurface tear of the supraspinatus and infraspinatus.  Tendinopathy of the biceps tendon.  Acromioclavicular arthritis.    Assessment & Plan    Left shoulder pain, unspecified chronicity  -     Ambulatory referral/consult to Physical/Occupational Therapy; Future; Expected date: 04/09/2024       I made the decision to obtain old records of the patient including previous notes and imaging. New imaging, if ordered today of the extremity or extremities, were evaluated. I independently reviewed and interpreted the radiographs and/or MRIs/CT scan today as well as prior imaging. I also reviewed the referring provider's documentation as well as any pertinent labs, tests and imaging.     Dustin Owens is a 68 y.o. male with left shoulder rotator cuff tendinopathy, small interstitial tear, biceps tendinitis    Treatment options were discussed in detail with the patient. We discussed multiple options including non-operative and operative treatment options.   Non-operatively we discussed bracing, physical therapy and injections. Operatively we discussed arthroscopy with indicated procedures and the expectations of surgery long term as well as the rehabilitation associated with surgery as well as risks and benefits of surgery.     After shared medical decision-making with the patient, patient would like to proceed with a trial of:     Corticosteroid Injection left shoulder   Formal Physical Therapy    We can consider surgery if pain is refractory to conservative treatment    All questions were answered and patient is agreeable  to the above plan.     Follow up:  6-8 weeks

## 2024-04-04 ENCOUNTER — CLINICAL SUPPORT (OUTPATIENT)
Dept: REHABILITATION | Facility: HOSPITAL | Age: 69
End: 2024-04-04
Attending: ORTHOPAEDIC SURGERY
Payer: MEDICARE

## 2024-04-04 DIAGNOSIS — Z74.09 IMPAIRED FUNCTIONAL MOBILITY AND ACTIVITY TOLERANCE: ICD-10-CM

## 2024-04-04 DIAGNOSIS — M25.512 LEFT SHOULDER PAIN, UNSPECIFIED CHRONICITY: ICD-10-CM

## 2024-04-04 DIAGNOSIS — M25.612 IMPAIRED RANGE OF MOTION OF LEFT SHOULDER: Primary | ICD-10-CM

## 2024-04-04 PROCEDURE — 97161 PT EVAL LOW COMPLEX 20 MIN: CPT | Mod: PN

## 2024-04-04 PROCEDURE — 97530 THERAPEUTIC ACTIVITIES: CPT | Mod: PN

## 2024-04-04 NOTE — PLAN OF CARE
[facility]  Physical Therapy Initial Evaluation    Name: Dustin Owens  Clinic Number: [id number]    Therapy Diagnosis:   Encounter Diagnoses   Name Primary?    Left shoulder pain, unspecified chronicity     Impaired range of motion of left shoulder Yes    Impaired functional mobility and activity tolerance       Physician: Philipp Chavez MD    Physician Orders: PT Eval and Treat  Medical Diagnosis from Referral: M25.512 (ICD-10-CM) - Left shoulder pain, unspecified chronicity   Evaluation Date: 4/4/2024  Authorization Period Expiration: 12/31/24  Plan of Care Expiration: 6/30/24    Progress Update: 5/5/24    Visit # / Visits authorized: 1 / 12  FOTO: Visit #1 - 1/3     PRECAUTIONS: Standard Precautions     MD Follow-up: /2023    Time In: 300  Time Out: 400  Total Appointment Time (timed & untimed codes): 60 minutes    SUBJECTIVE     Date of onset: 3-4 weeks     History of current condition - Dustin is a 68 y.o. male whom reports Left shoulder pain for about 3-4 weeks from lifting something. Had a injection last week and feels it is helping but thinks it is swollen since then. Having trouble bringing his arm out to the side.    Dustin's current exercise regiment includes: none .  Seeking Physical Therapy for raise arm without pain.    GILL: gradual   Falls: none   Physician Instructions (per patient): none   Other concerns: none    Imaging: No updated imaging for this episode of care     Prior Therapy: N/A  Social History: Pt lives with their family  Living Environment: 1 story   ADLs unable to complete: None increased pain   Gym/Home Equipment: none   Occupation: Pt is Retired   Prior Level of Function: Independent with all ADLs  Current Level of Function: % of PLOF    Pain:  Current 5 /10, worst 8 /10, best 3 /10   Location: Left shoulder Anterior aspect today   Description: Aching  Aggravating Factors: Abduction   Easing Factors: rest     Dominant Extremity: Right    Pts goals:  Pt reported goals are Raise arm overhead/ out to the side  without pain     _______________________________________________________  Medical History:   Past Medical History:   Diagnosis Date    Asthma     Colon polyp     Personal history of colonic polyps 07/11/2018       Surgical History:    has a past surgical history that includes Colonoscopy and Colonoscopy (N/A, 3/27/2023).    Medications:   has a current medication list which includes the following prescription(s): albuterol, atorvastatin, ipratropium, levocetirizine, and triamcinolone acetonide 0.1%, and the following Facility-Administered Medications: lidocaine hcl 10 mg/ml (1%).    Allergies:   Review of patient's allergies indicates:   Allergen Reactions    Amoxicillin Diarrhea    Pollen, micronized Other (See Comments)        OBJECTIVE     RANGE OF MOTION:    Cervical Right   (spine) Left    Goal   Cervical Flexion (60) WNL  45  Initial:    Cervical Extension (90) WNL  45  Initial:    Cervical Side Bending (45) WNL WNL 45  Initial:    Cervical Rotation (75) WNL WNL 60  Initial:      Shoulder AROM/PROM Right   Left   Goal   Forward Flexion (180)  180  Initial:    ER at 90 degrees (90) WNL 80 90  Initial:    ER at 0 degrees (45)  WNL 30 45  Initial:    Functional ER (C7) WNL Occiput C7  Initial:    Functional IR (T10) WNL L5 T10  Initial:      STRENGTH:    U/E MMT Right   Goal   Shoulder Flexion 5/5 5/5 B   Shoulder Abduction 5/5 5/5 B   Shoulder IR 5/5 5/5 B   Shoulder ER 5/5 5/5 B   Elbow Flexion  5/5 5/5 B   Elbow Extension 5/5 5/5 B     U/E MMT Left   Goal   Shoulder Flexion 4/5 5/5 B   Shoulder Abduction 3+/5 5/5 B   Shoulder IR 4+/5 5/5 B   Shoulder ER 4/5 5/5 B   Elbow Flexion  5/5 5/5 B   Elbow Extension 5/5 5/5 B     MUSCLE LENGTH:     Muscle Tested  Right   Left    Goal   Upper Trapezius   normal Normal B    Levator Scapulae   normal Normal B   Sternocleidomastoid  decreased Normal B   Scalenes   decreased Normal B    Pectoralis Minor    decreased Normal B   Pectoralis Major  decreased Normal B     JOINT MOBILITY:     Joint Motion Tested Right   Left    Goal   GHJ Posterior Glide  Hypomobile Normal B   GHJ Inferior Glide  Hypomobile Normal B   GHJ Lateral Glide  Hypomobile Normal B   GHJ Anterior Glide  Normal Normal B     SPECIAL TESTS:     Right   Left    Goal   Jones Td  Positive Negative B    Neers  Positive Negative B    Empty Can  Negative Negative B    Full Can  Negative Negative B    Sulcus Sign  Negative Negative B        Sensation:  Sensation is intact to light touch    Palpation: Increased tone and tenderness noted with palpation to: Bicipital groove     Posture:  Pt presents with postural abnormalities which include: forward head, rounded shoulders , increased thoracic kyphosis , rounded shoulders, and ankle/foot pronation    Gait: N/A    Movement Analysis: N/A    Balance: N/A      FUNCTION:     CMS Impairment/Limitation/Restriction for FOTO DASH Survey    Therapist reviewed FOTO scores for Dustin Owens on 4/4/2024.   FOTO documents entered into [employer] - see Media section.    Limitation Score: 66%         TREATMENT     Total Treatment time separate from Evaluation: (20) minutes    Dustin received therapeutic exercises to develop strength, endurance, ROM, flexibility, and posture for (20) minutes including: x = exercises performed     TherEx 4/4/2024    AAROM Dowel Flexion/Abduction      Manual resisted External Rotation/ Abduction      External Rotation/Internal Rotation Walkouts             Plan for Next Visit:     Upper Body Ergometer     Scapula Retractions   Thoracic Extensions   Thoracic Rotations    Supine Dowel Flexion  Supine Dowel External Rotation   Prone Row    Cable Column Rows   External Rotation/Internal Rotation Walkouts  Flexion Walkouts   Roller up wall        PATIENT EDUCATION AND HOME EXERCISES       Education/Self-Care provided: (included in treatment) minutes   Patient educated on the impairments noted  above and the effects of physical therapy intervention to improve overall condition and QOL.   Patient was educated on all the above exercise prior/during/after for proper posture, positioning, and execution for safe performance with home exercise program.  Exercise Prescription:   4/4/2024: EVAL:     Written Home Exercises Provided: yes. Prefers: Electronic Copies  Exercises were reviewed and Dustin was able to demonstrate them prior to the end of the session.  Dustin demonstrated good understanding of the education provided.     See EMR under Patient Instructions for exercises provided during therapy sessions.    ASSESSMENT     Dustin is a 68 y.o. male referred to outpatient Physical Therapy with a medical diagnosis of Shoulder pain.  Dustin presents with clinical signs and symptoms that support this diagnosis with . decreased shoulder girdle ROM, decreased scapular and shoulder strength, Glenohumeral joint hypomobility, and impaired functional mobility.    The above impairments will be addressed through manual therapy techniques, therapeutic exercises, functional training, and modalities as necessary. Patient was treated and educated on exercises for home program, progression of therapy, and benefits of therapy to achieve full functional mobility.     Pt prognosis is Good.   Pt will benefit from skilled outpatient Physical Therapy to address the deficits stated above and in the chart below, provide pt/family education, and to maximize pt's level of independence.     Plan of care discussed with patient: Yes  Pt's spiritual, cultural and educational needs considered and patient is agreeable to the plan of care and goals as stated below:     Anticipated Barriers for therapy: none  Medical Necessity is demonstrated by the following  History  Co-morbidities and personal factors that may impact the plan of care [x] LOW: no personal factors / co-morbidities  [] MODERATE: 1-2 personal factors / co-morbidities  [] HIGH:  3+ personal factors / co-morbidities    Moderate / High Support Documentation:      Examination  Body Structures and Functions, activity limitations and participation restrictions that may impact the plan of care [x] LOW: addressing 1-2 elements  [] MODERATE: 3+ elements  [] HIGH: 4+ elements (please support below)    Moderate / High Support Documentation:      Clinical Presentation [x] LOW: stable  [] MODERATE: Evolving  [] HIGH: Unstable     Decision Making/ Complexity Score: low         GOALS:  SHORT TERM GOALS:  3 weeks  Progress Date met   Recent signs and systems trend is improving in order to progress towards LTG's [] Met  [] Not Met  [x] Progressing     Patient will be independent with HEP in order to further progress and return to maximal function. [] Met  [] Not Met  [x] Progressing     Pain rating at Worst: 5/10 in order to progress towards increased independence with activity. [] Met  [] Not Met  [x] Progressing     Patient will be able to correct postural deviations in sitting and standing, to decrease pain and promote postural awareness for injury prevention.  [] Met  [] Not Met  [x] Progressing      [] Met  [] Not Met  [] Progressing        LONG TERM GOALS: 6 weeks  Progress Date met   Patient will return to normal ADL, recreational, and work related activities with less pain and limitation.  [] Met  [] Not Met  [x] Progressing     Patient will improve AROM to stated goals in order to return to maximal functional potential.  [] Met  [] Not Met  [x] Progressing     Patient will improve Strength to stated goals of appropriate musculature in order to improve functional independence.  [] Met  [] Not Met  [x] Progressing     Pain Rating at Best: 1/10 to improve Quality of Life.  [] Met  [] Not Met  [x] Progressing     Patient will meet predicted functional outcome (FOTO) score: 54% to increase self-worth & perceived functional ability. [] Met  [] Not Met  [x] Progressing     Patient will have met/partially  met personal goal of: No pain when raising arm overhead [] Met  [] Not Met  [x] Progressing      [] Met  [] Not Met  [] Progressing        PLAN   Plan of care Certification: 4/4/2024 to 6/30/24/2023    Outpatient Physical Therapy 2 times weekly for 6 weeks to include any combination of the following interventions: virtual visits, dry needling, modalities, electrical stimulation (IFC, Pre-Mod, Attended with Functional Dry Needling), Cervical/Lumbar Traction, Manual Therapy, Moist Heat/ Ice, Neuromuscular Re-ed, Patient Education, Self Care, Therapeutic Activities, Therapeutic Exercise, Functional Training, and Therapeutic Activites     Thank you for this referral.    Drake Padilla, PT      I CERTIFY THE NEED FOR THESE SERVICES FURNISHED UNDER THIS PLAN OF TREATMENT AND WHILE UNDER MY CARE   Physician's comments:     Physician's Signature: ___________________________________________________

## 2024-04-09 ENCOUNTER — OFFICE VISIT (OUTPATIENT)
Dept: ORTHOPEDICS | Facility: CLINIC | Age: 69
End: 2024-04-09
Payer: MEDICARE

## 2024-04-09 VITALS — WEIGHT: 176.13 LBS | BODY MASS INDEX: 26.09 KG/M2 | RESPIRATION RATE: 16 BRPM | HEIGHT: 69 IN

## 2024-04-09 DIAGNOSIS — R22.32 LUMP OF SKIN OF LEFT UPPER EXTREMITY: Primary | ICD-10-CM

## 2024-04-09 PROCEDURE — 99214 OFFICE O/P EST MOD 30 MIN: CPT | Mod: S$PBB,,, | Performed by: ORTHOPAEDIC SURGERY

## 2024-04-09 PROCEDURE — 99999 PR PBB SHADOW E&M-EST. PATIENT-LVL III: CPT | Mod: PBBFAC,,, | Performed by: ORTHOPAEDIC SURGERY

## 2024-04-09 PROCEDURE — 99213 OFFICE O/P EST LOW 20 MIN: CPT | Mod: PBBFAC,PO | Performed by: ORTHOPAEDIC SURGERY

## 2024-04-09 NOTE — H&P (VIEW-ONLY)
32 year old male with a PMHx of __ , on Truvada for PREP who presents with       Vitals: T max 37.5C, HR 94, /74, SpO2 97% on room air.  Labs: CBC unremarkable. CMP unremarkable. COVID-19 detected. Patient ID: Dustin Owens is a 68 y.o. male    Chief Complaint:   Chief Complaint   Patient presents with    Left Shoulder - Swelling     Left shoulder injection site 4/2/2024       History of Present Illness:    68-year-old male here for evaluation of left shoulder pain.  Reports 3 or 4 week history of left shoulder pain and difficulty with overhead activity.  No history of diabetes.  No known trauma or falls.  Pain is global in the left shoulder and worse with overhead activity.  He reports pain mostly with overhead motion and nighttime symptoms.  No previous surgeries to left shoulder.  History of right shoulder pain in the past treated conservatively.    ____________________________________________________________________    Interval history 04/09/2024 : Patient returns today for follow up of his left shoulder injection.  Has noticed a lump on the left posterior axilla and shoulder blade.  He noticed this after his injection.  Pain is 5/10 today.  Has been doing physical therapy recently.    PAST MEDICAL HISTORY:   Past Medical History:   Diagnosis Date    Asthma     Colon polyp     Personal history of colonic polyps 07/11/2018     PAST SURGICAL HISTORY:   Past Surgical History:   Procedure Laterality Date    COLONOSCOPY      COLONOSCOPY N/A 3/27/2023    Procedure: COLONOSCOPY;  Surgeon: Arjun Ramos MD;  Location: [facility];  Service: Endoscopy;  Laterality: N/A;     FAMILY HISTORY:   Family History   Problem Relation Age of Onset    Cancer Mother     Diabetes Father     Colon cancer Neg Hx     Colon polyps Neg Hx     Crohn's disease Neg Hx     Ulcerative colitis Neg Hx     Esophageal cancer Neg Hx     Stomach cancer Neg Hx      SOCIAL HISTORY:   Social History     Occupational History    Not on file   Tobacco Use    Smoking status: Former     Passive exposure: Past    Smokeless tobacco: Never   Substance and Sexual Activity    Alcohol use: Not Currently    Drug use: Never    Sexual activity: Yes      Partners: Female        MEDICATIONS:   Current Outpatient Medications:     atorvastatin (LIPITOR) 10 MG tablet, Take 1 tablet by mouth once daily., Disp: , Rfl:     ipratropium (ATROVENT) 21 mcg (0.03 %) nasal spray, 2 sprays by Each Nostril route every 4 to 6 hours as needed for Rhinitis., Disp: 30 mL, Rfl: 6    levocetirizine (XYZAL) 5 MG tablet, Take 1 tablet (5 mg total) by mouth every evening., Disp: 30 tablet, Rfl: 3    triamcinolone acetonide 0.1% (KENALOG) 0.1 % cream, Apply topically 2 (two) times daily as needed (rash)., Disp: 80 g, Rfl: 9    albuterol (VENTOLIN HFA) 90 mcg/actuation inhaler, Inhale 2 puffs into the lungs every 6 (six) hours as needed for Wheezing. Rescue, Disp: 18 g, Rfl: 0    Current Facility-Administered Medications:     LIDOcaine HCL 10 mg/ml (1%) injection 3 mL, 3 mL, Other, Once, Vira Bautista,   ALLERGIES:   Review of patient's allergies indicates:   Allergen Reactions    Amoxicillin Diarrhea    Pollen, micronized Other (See Comments)         Physical Exam     Vitals:    04/09/24 0859   Resp: 16       Alert and oriented to person, place and time. No acute distress. Well-groomed, not ill appearing. Pupils round and reactive, normal respiratory effort, no audible wheezing.     Shoulder  Exam    OBSERVATION:     Swelling  none     Discoloration  none      Scars   none     Deformity  None    TENDERNESS                Clavicle   negative         AC Jt.    negative               Acromion:  negative        Scapular Spine negative   Supraspinatus  positive       Infraspinatus  negative   LH Biceps   negative   Greater Tub.  Positive        ROM:               Forward Flexion  150°  with pain     ER at 0°    60°        ER at 90° ABD  90°       IR at 90°  ABD   NA         IR (spine level)   L2        STRENGTH:       SCAPTION   4+/5        IR    5/5       ER    5/5       BICEPS   4+/5            SIGNS:          NEER   +     WALTON   +        DROP ARM   Neg   GABRIEL's  +    BELLY  PRESS Neg   O'SARA's  Neg    SPEEDs  +   LIFT-OFF  Neg   X-Body ADD    Neg          Soft tissue mass of the left posterior axilla and shoulder blade which does not feel fluid-filled.  Mobile.  Nontender.      Imaging:       X-Ray: I have reviewed all pertinent results/findings and my personal findings are:  Negative left shoulder radiographs.  No evidence of fracture or dislocation.  Joint space is well-maintained.    MRI left shoulder reviewed by me showing no full-thickness tear.  There is small partial-thickness undersurface tear of the supraspinatus and infraspinatus.  Tendinopathy of the biceps tendon.  Acromioclavicular arthritis.  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  He does have a posterior soft tissue mass likely lipoma which is partially visualized on shoulder MRI.  We discussed treatment options for this including ultrasound of the left shoulder to evaluate.  If any concerning findings can consider MRI of the scapula.             32 year old male with no past medical history, on Truvada for PREP who presents as instructed by his infectious diseases physician for concern of monkey pox. Patient was in his usual state of health until last Saturday, Jul 23rd when he developed symptoms of nasal congestion and cough. He tested positive for COVID. Then on Monday Jul 25th the patient started to develop numerous pustular lesions on his penis. These lesions were initially painful but now felt to be more inflammed/ itchy. New scattered lesions were noted on his bilateral hands, shoulders and back. He also has two on his left eye, one on the upper eyelid and one in the tearduct. There is no associated blurred vision or sensitivity to light. The patient had fevers and chills at home, which have become worse over the past few days despite initial COVID symptoms improving.    He was seen at ID clinic on 7/26 for these symptoms. Per HIE, lesion HSV 1/2 was not detected. No result for Monkey pox PCR is available.     The patient is vaccinated against COVID however he has not been boosted. He was offered Monkey pox vaccine at outpatient ID visit one week prior to these events; however he declined at that time given concerns for side effects from vaccination. His social history is significant for sexual activity with male. He has two sexual partners who are not known to have similar lesions.    Vitals: T max 37.5C, HR 94, /74, SpO2 97% on room air.  Labs: CBC unremarkable. CMP unremarkable. COVID-19 detected.    ED interventions: acetaminophen 975 mg PO x1, ketorolac 15mg IV x1.

## 2024-04-09 NOTE — PROGRESS NOTES
Patient ID: Dustin Owens is a 68 y.o. male    Chief Complaint:   Chief Complaint   Patient presents with    Left Shoulder - Swelling     Left shoulder injection site 4/2/2024       History of Present Illness:    68-year-old male here for evaluation of left shoulder pain.  Reports 3 or 4 week history of left shoulder pain and difficulty with overhead activity.  No history of diabetes.  No known trauma or falls.  Pain is global in the left shoulder and worse with overhead activity.  He reports pain mostly with overhead motion and nighttime symptoms.  No previous surgeries to left shoulder.  History of right shoulder pain in the past treated conservatively.    ____________________________________________________________________    Interval history 04/09/2024 : Patient returns today for follow up of his left shoulder injection.  Has noticed a lump on the left posterior axilla and shoulder blade.  He noticed this after his injection.  Pain is 5/10 today.  Has been doing physical therapy recently.    PAST MEDICAL HISTORY:   Past Medical History:   Diagnosis Date    Asthma     Colon polyp     Personal history of colonic polyps 07/11/2018     PAST SURGICAL HISTORY:   Past Surgical History:   Procedure Laterality Date    COLONOSCOPY      COLONOSCOPY N/A 3/27/2023    Procedure: COLONOSCOPY;  Surgeon: Arjun Ramos MD;  Location: [facility];  Service: Endoscopy;  Laterality: N/A;     FAMILY HISTORY:   Family History   Problem Relation Age of Onset    Cancer Mother     Diabetes Father     Colon cancer Neg Hx     Colon polyps Neg Hx     Crohn's disease Neg Hx     Ulcerative colitis Neg Hx     Esophageal cancer Neg Hx     Stomach cancer Neg Hx      SOCIAL HISTORY:   Social History     Occupational History    Not on file   Tobacco Use    Smoking status: Former     Passive exposure: Past    Smokeless tobacco: Never   Substance and Sexual Activity    Alcohol use: Not Currently    Drug use: Never    Sexual activity: Yes      Partners: Female        MEDICATIONS:   Current Outpatient Medications:     atorvastatin (LIPITOR) 10 MG tablet, Take 1 tablet by mouth once daily., Disp: , Rfl:     ipratropium (ATROVENT) 21 mcg (0.03 %) nasal spray, 2 sprays by Each Nostril route every 4 to 6 hours as needed for Rhinitis., Disp: 30 mL, Rfl: 6    levocetirizine (XYZAL) 5 MG tablet, Take 1 tablet (5 mg total) by mouth every evening., Disp: 30 tablet, Rfl: 3    triamcinolone acetonide 0.1% (KENALOG) 0.1 % cream, Apply topically 2 (two) times daily as needed (rash)., Disp: 80 g, Rfl: 9    albuterol (VENTOLIN HFA) 90 mcg/actuation inhaler, Inhale 2 puffs into the lungs every 6 (six) hours as needed for Wheezing. Rescue, Disp: 18 g, Rfl: 0    Current Facility-Administered Medications:     LIDOcaine HCL 10 mg/ml (1%) injection 3 mL, 3 mL, Other, Once, Vira Bautista,   ALLERGIES:   Review of patient's allergies indicates:   Allergen Reactions    Amoxicillin Diarrhea    Pollen, micronized Other (See Comments)         Physical Exam     Vitals:    04/09/24 0859   Resp: 16       Alert and oriented to person, place and time. No acute distress. Well-groomed, not ill appearing. Pupils round and reactive, normal respiratory effort, no audible wheezing.     Shoulder  Exam    OBSERVATION:     Swelling  none     Discoloration  none      Scars   none     Deformity  None    TENDERNESS                Clavicle   negative         AC Jt.    negative               Acromion:  negative        Scapular Spine negative   Supraspinatus  positive       Infraspinatus  negative   LH Biceps   negative   Greater Tub.  Positive        ROM:               Forward Flexion  150°  with pain     ER at 0°    60°        ER at 90° ABD  90°       IR at 90°  ABD   NA         IR (spine level)   L2        STRENGTH:       SCAPTION   4+/5        IR    5/5       ER    5/5       BICEPS   4+/5            SIGNS:          NEER   +     WALTON   +        DROP ARM   Neg   GABRIEL's  +    BELLY  PRESS Neg   O'SARA's  Neg    SPEEDs  +   LIFT-OFF  Neg   X-Body ADD    Neg          Soft tissue mass of the left posterior axilla and shoulder blade which does not feel fluid-filled.  Mobile.  Nontender.      Imaging:       X-Ray: I have reviewed all pertinent results/findings and my personal findings are:  Negative left shoulder radiographs.  No evidence of fracture or dislocation.  Joint space is well-maintained.    MRI left shoulder reviewed by me showing no full-thickness tear.  There is small partial-thickness undersurface tear of the supraspinatus and infraspinatus.  Tendinopathy of the biceps tendon.  Acromioclavicular arthritis.  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  He does have a posterior soft tissue mass likely lipoma which is partially visualized on shoulder MRI.  We discussed treatment options for this including ultrasound of the left shoulder to evaluate.  If any concerning findings can consider MRI of the scapula.

## 2024-04-10 NOTE — PROGRESS NOTES
"  Dustin Owens presented for an initial Medicare AWV today. The following components were reviewed and updated:    Medical history  Family History  Social history  Allergies and Current Medications  Health Risk Assessment  Health Maintenance  Care Team    **See Completed Assessments for Annual Wellness visit with in the encounter summary    The following assessments were completed:  Depression Screening  Cognitive function Screening    Timed Get Up Test  Whisper Test      Opioid documentation:      Patient does not have a current opioid prescription.          Vitals:    03/28/24 0705   BP: 124/68   BP Location: Left arm   Patient Position: Sitting   BP Method: Medium (Manual)   Pulse: 71   SpO2: 99%   Weight: 79.9 kg (176 lb 2.4 oz)   Height: 5' 9" (1.753 m)     Body mass index is 26.01 kg/m².       Physical Exam  Vitals reviewed.   HENT:      Head: Normocephalic.   Cardiovascular:      Rate and Rhythm: Normal rate.   Pulmonary:      Effort: Pulmonary effort is normal. No respiratory distress.   Skin:     General: Skin is warm.   Neurological:      Mental Status: He is alert.   Psychiatric:         Mood and Affect: Mood normal.           Diagnoses and health risks identified today and associated recommendations/orders:  1. Encounter for Medicare annual wellness exam  Reviewed health maintenance and provided recommendations   Recommend rsv vaccine   - Ambulatory Referral/Consult to Enhanced Annual Wellness Visit (eAWV)    2. Moderate episode of recurrent major depressive disorder  Continue to monitor  Followed by Jimbo Howe MD   Improved  No si/hi.      3. Hyperlipidemia, unspecified hyperlipidemia type  Continue to monitor  Followed by Jimbo Howe MD   Taking statin.      4. Non-seasonal allergic rhinitis due to pollen  Continue to monitor  Followed by taking xyzal.        Provided Dustin with a 5-10 year written screening schedule and personal prevention plan. Recommendations were developed " using the USPSTF age appropriate recommendations. Education, counseling, and referrals were provided as needed.  After Visit Summary printed and given to patient which includes a list of additional screenings\tests needed.    No follow-ups on file.      Dee Staples NP

## 2024-04-11 ENCOUNTER — HOSPITAL ENCOUNTER (OUTPATIENT)
Dept: RADIOLOGY | Facility: HOSPITAL | Age: 69
Discharge: HOME | End: 2024-04-11
Attending: ORTHOPAEDIC SURGERY
Payer: MEDICARE

## 2024-04-11 DIAGNOSIS — R22.32 LUMP OF SKIN OF LEFT UPPER EXTREMITY: ICD-10-CM

## 2024-04-11 PROCEDURE — 76882 US LMTD JT/FCL EVL NVASC XTR: CPT | Mod: TC,LT

## 2024-04-11 PROCEDURE — 76882 US LMTD JT/FCL EVL NVASC XTR: CPT | Mod: 26,LT,, | Performed by: RADIOLOGY

## 2024-04-16 ENCOUNTER — TELEPHONE (OUTPATIENT)
Dept: ORTHOPEDICS | Facility: CLINIC | Age: 69
End: 2024-04-16
Payer: MEDICARE

## 2024-04-16 NOTE — TELEPHONE ENCOUNTER
----- Message from Philipp Chavez MD sent at 4/16/2024  2:28 PM CDT -----  U/S confirms a lipoma of his posterior shoulder. Can remove it if he wants it removed.

## 2024-04-17 DIAGNOSIS — R22.32 SKIN LUMP OF ARM, LEFT: ICD-10-CM

## 2024-04-17 DIAGNOSIS — Z01.818 PRE-OP TESTING: ICD-10-CM

## 2024-04-17 DIAGNOSIS — R22.32 LUMP OF SKIN OF LEFT UPPER EXTREMITY: Primary | ICD-10-CM

## 2024-04-17 RX ORDER — MUPIROCIN 20 MG/G
OINTMENT TOPICAL
Status: CANCELLED | OUTPATIENT
Start: 2024-04-17

## 2024-04-17 RX ORDER — CLINDAMYCIN PHOSPHATE 900 MG/50ML
900 INJECTION, SOLUTION INTRAVENOUS
Status: CANCELLED | OUTPATIENT
Start: 2024-04-17

## 2024-04-17 NOTE — TELEPHONE ENCOUNTER
Surgery scheduled to remove. Date confirmed. Pt advised information to be sent via pt portal for pt to review. Pt to call back with any further questions.

## 2024-04-24 ENCOUNTER — HOSPITAL ENCOUNTER (OUTPATIENT)
Dept: PREADMISSION TESTING | Facility: HOSPITAL | Age: 69
Discharge: HOME | End: 2024-04-24
Attending: ORTHOPAEDIC SURGERY
Payer: MEDICARE

## 2024-04-24 DIAGNOSIS — R22.32 LUMP OF SKIN OF LEFT UPPER EXTREMITY: ICD-10-CM

## 2024-04-24 DIAGNOSIS — Z01.818 PRE-OP TESTING: ICD-10-CM

## 2024-04-24 LAB
ANION GAP SERPL CALC-SCNC: 8 MMOL/L (ref 8–16)
BASOPHILS # BLD AUTO: 0.04 K/UL (ref 0–0.2)
BASOPHILS NFR BLD: 0.5 % (ref 0–1.9)
BUN SERPL-MCNC: 14 MG/DL (ref 8–23)
CALCIUM SERPL-MCNC: 9.8 MG/DL (ref 8.7–10.5)
CHLORIDE SERPL-SCNC: 104 MMOL/L (ref 95–110)
CO2 SERPL-SCNC: 27 MMOL/L (ref 23–29)
CREAT SERPL-MCNC: 1.4 MG/DL (ref 0.5–1.4)
DIFFERENTIAL METHOD BLD: NORMAL
EOSINOPHIL # BLD AUTO: 0.1 K/UL (ref 0–0.5)
EOSINOPHIL NFR BLD: 1.4 % (ref 0–8)
ERYTHROCYTE [DISTWIDTH] IN BLOOD BY AUTOMATED COUNT: 13.7 % (ref 11.5–14.5)
EST. GFR  (NO RACE VARIABLE): 55 ML/MIN/1.73 M^2
GLUCOSE SERPL-MCNC: 97 MG/DL (ref 70–110)
HCT VFR BLD AUTO: 46.5 % (ref 40–54)
HGB BLD-MCNC: 15 G/DL (ref 14–18)
IMM GRANULOCYTES # BLD AUTO: 0.01 K/UL (ref 0–0.04)
IMM GRANULOCYTES NFR BLD AUTO: 0.1 % (ref 0–0.5)
LYMPHOCYTES # BLD AUTO: 2.4 K/UL (ref 1–4.8)
LYMPHOCYTES NFR BLD: 31.6 % (ref 18–48)
MCH RBC QN AUTO: 28.5 PG (ref 27–31)
MCHC RBC AUTO-ENTMCNC: 32.3 G/DL (ref 32–36)
MCV RBC AUTO: 88 FL (ref 82–98)
MONOCYTES # BLD AUTO: 0.6 K/UL (ref 0.3–1)
MONOCYTES NFR BLD: 7.4 % (ref 4–15)
NEUTROPHILS # BLD AUTO: 4.5 K/UL (ref 1.8–7.7)
NEUTROPHILS NFR BLD: 59 % (ref 38–73)
NRBC BLD-RTO: 0 /100 WBC
PLATELET # BLD AUTO: 191 K/UL (ref 150–450)
PMV BLD AUTO: 9.4 FL (ref 9.2–12.9)
POTASSIUM SERPL-SCNC: 4.3 MMOL/L (ref 3.5–5.1)
RBC # BLD AUTO: 5.27 M/UL (ref 4.6–6.2)
SODIUM SERPL-SCNC: 139 MMOL/L (ref 136–145)
WBC # BLD AUTO: 7.66 K/UL (ref 3.9–12.7)

## 2024-04-24 PROCEDURE — 93005 ELECTROCARDIOGRAM TRACING: CPT

## 2024-04-24 PROCEDURE — 93010 ELECTROCARDIOGRAM REPORT: CPT | Mod: ,,, | Performed by: INTERNAL MEDICINE

## 2024-04-24 PROCEDURE — 85025 COMPLETE CBC W/AUTO DIFF WBC: CPT | Performed by: ORTHOPAEDIC SURGERY

## 2024-04-24 PROCEDURE — 36415 COLL VENOUS BLD VENIPUNCTURE: CPT | Performed by: ORTHOPAEDIC SURGERY

## 2024-04-24 PROCEDURE — 80048 BASIC METABOLIC PNL TOTAL CA: CPT | Performed by: ORTHOPAEDIC SURGERY

## 2024-04-24 NOTE — DISCHARGE INSTRUCTIONS
To confirm, Your doctor has instructed you that surgery is scheduled for: 5/1/24    Please report to [facility], Registration the morning of surgery. You must check-in and receive a wristband before going to your procedure.  [address] [place] [address] [place] [address]    Pre-Op will call the afternoon prior to surgery between 1:00 and 6:00 PM with the final arrival time.  Phone number: [phone]    PLEASE NOTE:  The surgery schedule has many variables which may affect the time of your surgery case.  Family members should be available if your surgery time changes.  Plan to be here the day of your procedure between 4-6 hours.    MEDICATIONS:  TAKE ONLY THESE MEDICATIONS WITH A SMALL SIP OF WATER THE MORNING OF YOUR PROCEDURE:    SEE MED LIST          DO NOT TAKE THESE MEDICATIONS 5-7 DAYS PRIOR to your procedure or per your surgeon's request:   ASPIRIN, ALEVE, ADVIL, IBUPROFEN, FISH OIL VITAMIN E, HERBALS  (May take Tylenol)    ONLY if you are prescribed any types of blood thinners such as:  Aspirin, Coumadin, Plavix, Pradaxa, Xarelto, Aggrenox, Effient, Eliquis, Savasya, Brilinta, or any other, ask your surgeon whether you should stop taking them and how long before surgery you should stop.  You may also need to verify with the prescribing physician if it is ok to stop your medication.      INSTRUCTIONS IMPORTANT!!  Do not eat or drink anything between midnight and the time of your procedure- this includes gum, mints, and candy.  Do not smoke or drink alcoholic beverages 24 hours prior to your procedure.  Shower the night before AND the morning of your procedure with a Chlorhexidine wash such as Hibiclens or Dial antibacterial soap from the neck down.  Do not get it on your face or in your eyes.  You may use your own shampoo and face wash. This helps your skin to be as bacteria free as possible.    If you wear contact lenses, dentures, hearing aids or glasses, bring a container to put them in  during surgery and give to a family member for safe keeping.  Please leave all jewelry, piercing's and valuables at home. You must remove your false eyelashes prior to surgery.    DO NOT remove hair from the surgery site.  Do not shave the incision site unless you are given specific instructions to do so.    ONLY if you have been diagnosed with sleep apnea please bring your C-PAP machine.  ONLY if you wear home oxygen please bring your portable oxygen tank the day of your procedure.  ONLY if you have a history of OPEN HEART SURGERY you will need a clearance from your Cardiologist per Anesthesia.      ONLY for patients requiring bowel prep, written instructions will be given by your doctor's office.  ONLY if you have a neuro stimulator, please bring the controller with you the morning of surgery  ONLY if a type and screen test is needed before surgery, please return:  If your doctor has scheduled you for an overnight stay, bring a small overnight bag with any personal items you need.  Make arrangements in advance for transportation home by a responsible adult. You can not go home in an uber or a cab per hospital policy.  It is not safe to drive a vehicle during the 24 hours after anesthesia.          All  facilities and properties are tobacco free.  Smoking is NOT allowed.   If you have any questions about these instructions, call Pre-Op Admit  Nursing at [phone] or the Pre-Op Day Surgery Unit at [phone].

## 2024-04-29 LAB
OHS QRS DURATION: 92 MS
OHS QTC CALCULATION: 435 MS

## 2024-04-30 ENCOUNTER — ANESTHESIA EVENT (OUTPATIENT)
Dept: SURGERY | Facility: HOSPITAL | Age: 69
End: 2024-04-30
Payer: MEDICARE

## 2024-04-30 RX ORDER — ONDANSETRON 4 MG/1
4 TABLET, ORALLY DISINTEGRATING ORAL EVERY 8 HOURS PRN
Qty: 30 TABLET | Refills: 0 | Status: SHIPPED | OUTPATIENT
Start: 2024-04-30 | End: 2024-06-10

## 2024-04-30 RX ORDER — HYDROCODONE BITARTRATE AND ACETAMINOPHEN 5; 325 MG/1; MG/1
1 TABLET ORAL EVERY 8 HOURS PRN
Qty: 21 TABLET | Refills: 0 | Status: SHIPPED | OUTPATIENT
Start: 2024-04-30 | End: 2024-06-10

## 2024-04-30 RX ORDER — IBUPROFEN 600 MG/1
600 TABLET ORAL 2 TIMES DAILY
Qty: 60 TABLET | Refills: 0 | Status: SHIPPED | OUTPATIENT
Start: 2024-04-30 | End: 2024-06-10

## 2024-05-01 ENCOUNTER — HOSPITAL ENCOUNTER (OUTPATIENT)
Facility: HOSPITAL | Age: 69
Discharge: HOME | End: 2024-05-01
Attending: ORTHOPAEDIC SURGERY | Admitting: ORTHOPAEDIC SURGERY
Payer: MEDICARE

## 2024-05-01 ENCOUNTER — ANESTHESIA (OUTPATIENT)
Dept: SURGERY | Facility: HOSPITAL | Age: 69
End: 2024-05-01
Payer: MEDICARE

## 2024-05-01 DIAGNOSIS — R22.32 LUMP OF SKIN OF LEFT UPPER EXTREMITY: ICD-10-CM

## 2024-05-01 DIAGNOSIS — Z01.818 PRE-OP TESTING: ICD-10-CM

## 2024-05-01 DIAGNOSIS — R22.32 SKIN LUMP OF ARM, LEFT: ICD-10-CM

## 2024-05-01 PROCEDURE — 25000003 PHARM REV CODE 250: Performed by: STUDENT IN AN ORGANIZED HEALTH CARE EDUCATION/TRAINING PROGRAM

## 2024-05-01 PROCEDURE — 71000039 HC RECOVERY, EACH ADD'L HOUR: Performed by: ORTHOPAEDIC SURGERY

## 2024-05-01 PROCEDURE — 37000009 HC ANESTHESIA EA ADD 15 MINS: Performed by: ORTHOPAEDIC SURGERY

## 2024-05-01 PROCEDURE — 23076 EXC SHOULDER TUM DEEP < 5 CM: CPT | Mod: LT,,, | Performed by: ORTHOPAEDIC SURGERY

## 2024-05-01 PROCEDURE — D9220A PRA ANESTHESIA: Mod: ANES,,, | Performed by: ANESTHESIOLOGY

## 2024-05-01 PROCEDURE — 71000033 HC RECOVERY, INTIAL HOUR: Performed by: ORTHOPAEDIC SURGERY

## 2024-05-01 PROCEDURE — D9220A PRA ANESTHESIA: Mod: CRNA,,, | Performed by: STUDENT IN AN ORGANIZED HEALTH CARE EDUCATION/TRAINING PROGRAM

## 2024-05-01 PROCEDURE — 25000003 PHARM REV CODE 250: Performed by: ORTHOPAEDIC SURGERY

## 2024-05-01 PROCEDURE — 36000706: Performed by: ORTHOPAEDIC SURGERY

## 2024-05-01 PROCEDURE — 63600175 PHARM REV CODE 636 W HCPCS: Mod: JZ,JG | Performed by: ORTHOPAEDIC SURGERY

## 2024-05-01 PROCEDURE — 88304 TISSUE EXAM BY PATHOLOGIST: CPT | Mod: TC | Performed by: PATHOLOGY

## 2024-05-01 PROCEDURE — 63600175 PHARM REV CODE 636 W HCPCS: Performed by: ANESTHESIOLOGY

## 2024-05-01 PROCEDURE — 71000016 HC POSTOP RECOV ADDL HR: Performed by: ORTHOPAEDIC SURGERY

## 2024-05-01 PROCEDURE — 36000707: Performed by: ORTHOPAEDIC SURGERY

## 2024-05-01 PROCEDURE — 71000015 HC POSTOP RECOV 1ST HR: Performed by: ORTHOPAEDIC SURGERY

## 2024-05-01 PROCEDURE — 63600175 PHARM REV CODE 636 W HCPCS: Performed by: STUDENT IN AN ORGANIZED HEALTH CARE EDUCATION/TRAINING PROGRAM

## 2024-05-01 PROCEDURE — 27200651 HC AIRWAY, LMA: Performed by: ANESTHESIOLOGY

## 2024-05-01 PROCEDURE — 25000003 PHARM REV CODE 250: Performed by: ANESTHESIOLOGY

## 2024-05-01 PROCEDURE — 37000008 HC ANESTHESIA 1ST 15 MINUTES: Performed by: ORTHOPAEDIC SURGERY

## 2024-05-01 RX ORDER — CLINDAMYCIN PHOSPHATE 900 MG/50ML
900 INJECTION, SOLUTION INTRAVENOUS
Status: COMPLETED | OUTPATIENT
Start: 2024-05-01 | End: 2024-05-01

## 2024-05-01 RX ORDER — MUPIROCIN 20 MG/G
OINTMENT TOPICAL
Status: DISCONTINUED | OUTPATIENT
Start: 2024-05-01 | End: 2024-05-01 | Stop reason: HOSPADM

## 2024-05-01 RX ORDER — ONDANSETRON HYDROCHLORIDE 2 MG/ML
4 INJECTION, SOLUTION INTRAVENOUS DAILY PRN
Status: ACTIVE | OUTPATIENT
Start: 2024-05-01

## 2024-05-01 RX ORDER — ACETAMINOPHEN 325 MG/1
650 TABLET ORAL EVERY 4 HOURS PRN
Status: CANCELLED | OUTPATIENT
Start: 2024-05-01

## 2024-05-01 RX ORDER — HYDROCODONE BITARTRATE AND ACETAMINOPHEN 5; 325 MG/1; MG/1
1 TABLET ORAL EVERY 4 HOURS PRN
Status: CANCELLED | OUTPATIENT
Start: 2024-05-01

## 2024-05-01 RX ORDER — OXYCODONE HYDROCHLORIDE 5 MG/1
5 TABLET ORAL
Status: DISPENSED | OUTPATIENT
Start: 2024-05-01

## 2024-05-01 RX ORDER — SODIUM CHLORIDE 0.9 % (FLUSH) 0.9 %
3 SYRINGE (ML) INJECTION
Status: ACTIVE | OUTPATIENT
Start: 2024-05-01

## 2024-05-01 RX ORDER — ACETAMINOPHEN 10 MG/ML
INJECTION, SOLUTION INTRAVENOUS
Status: DISCONTINUED | OUTPATIENT
Start: 2024-05-01 | End: 2024-05-01

## 2024-05-01 RX ORDER — LIDOCAINE HYDROCHLORIDE 10 MG/ML
1 INJECTION, SOLUTION EPIDURAL; INFILTRATION; INTRACAUDAL; PERINEURAL ONCE
Status: COMPLETED | OUTPATIENT
Start: 2024-05-01 | End: 2024-05-01

## 2024-05-01 RX ORDER — SODIUM CHLORIDE 0.9 % (FLUSH) 0.9 %
10 SYRINGE (ML) INJECTION
Status: DISCONTINUED | OUTPATIENT
Start: 2024-05-01 | End: 2024-05-01 | Stop reason: HOSPADM

## 2024-05-01 RX ORDER — LIDOCAINE HYDROCHLORIDE 20 MG/ML
INJECTION INTRAVENOUS
Status: DISCONTINUED | OUTPATIENT
Start: 2024-05-01 | End: 2024-05-01

## 2024-05-01 RX ORDER — PROPOFOL 10 MG/ML
VIAL (ML) INTRAVENOUS
Status: DISCONTINUED | OUTPATIENT
Start: 2024-05-01 | End: 2024-05-01

## 2024-05-01 RX ORDER — ONDANSETRON 4 MG/1
8 TABLET, ORALLY DISINTEGRATING ORAL EVERY 8 HOURS PRN
Status: CANCELLED | OUTPATIENT
Start: 2024-05-01

## 2024-05-01 RX ORDER — DEXAMETHASONE SODIUM PHOSPHATE 4 MG/ML
INJECTION, SOLUTION INTRA-ARTICULAR; INTRALESIONAL; INTRAMUSCULAR; INTRAVENOUS; SOFT TISSUE
Status: DISCONTINUED | OUTPATIENT
Start: 2024-05-01 | End: 2024-05-01

## 2024-05-01 RX ORDER — MIDAZOLAM HYDROCHLORIDE 1 MG/ML
INJECTION INTRAMUSCULAR; INTRAVENOUS
Status: DISCONTINUED | OUTPATIENT
Start: 2024-05-01 | End: 2024-05-01

## 2024-05-01 RX ORDER — BUPIVACAINE HYDROCHLORIDE 5 MG/ML
INJECTION, SOLUTION EPIDURAL; INTRACAUDAL
Status: DISCONTINUED | OUTPATIENT
Start: 2024-05-01 | End: 2024-05-01 | Stop reason: HOSPADM

## 2024-05-01 RX ORDER — ONDANSETRON HYDROCHLORIDE 2 MG/ML
INJECTION, SOLUTION INTRAVENOUS
Status: DISCONTINUED | OUTPATIENT
Start: 2024-05-01 | End: 2024-05-01

## 2024-05-01 RX ORDER — FENTANYL CITRATE 50 UG/ML
INJECTION, SOLUTION INTRAMUSCULAR; INTRAVENOUS
Status: DISCONTINUED | OUTPATIENT
Start: 2024-05-01 | End: 2024-05-01

## 2024-05-01 RX ORDER — OXYCODONE HYDROCHLORIDE 10 MG/1
10 TABLET ORAL EVERY 4 HOURS PRN
Status: CANCELLED | OUTPATIENT
Start: 2024-05-01

## 2024-05-01 RX ORDER — FENTANYL CITRATE 50 UG/ML
25 INJECTION, SOLUTION INTRAMUSCULAR; INTRAVENOUS EVERY 5 MIN PRN
Status: DISPENSED | OUTPATIENT
Start: 2024-05-01

## 2024-05-01 RX ORDER — KETOROLAC TROMETHAMINE 30 MG/ML
INJECTION, SOLUTION INTRAMUSCULAR; INTRAVENOUS
Status: DISCONTINUED | OUTPATIENT
Start: 2024-05-01 | End: 2024-05-01

## 2024-05-01 RX ADMIN — GLYCOPYRROLATE 0.2 MG: 0.2 INJECTION, SOLUTION INTRAMUSCULAR; INTRAVITREAL at 07:05

## 2024-05-01 RX ADMIN — ACETAMINOPHEN 1000 MG: 10 INJECTION, SOLUTION INTRAVENOUS at 08:05

## 2024-05-01 RX ADMIN — MUPIROCIN 1 G: 20 OINTMENT TOPICAL at 06:05

## 2024-05-01 RX ADMIN — DEXAMETHASONE SODIUM PHOSPHATE 4 MG: 4 INJECTION, SOLUTION INTRA-ARTICULAR; INTRALESIONAL; INTRAMUSCULAR; INTRAVENOUS; SOFT TISSUE at 07:05

## 2024-05-01 RX ADMIN — FENTANYL CITRATE 25 MCG: 50 INJECTION, SOLUTION INTRAMUSCULAR; INTRAVENOUS at 09:05

## 2024-05-01 RX ADMIN — MIDAZOLAM HYDROCHLORIDE 2 MG: 1 INJECTION INTRAMUSCULAR; INTRAVENOUS at 07:05

## 2024-05-01 RX ADMIN — FENTANYL CITRATE 25 MCG: 50 INJECTION, SOLUTION INTRAMUSCULAR; INTRAVENOUS at 08:05

## 2024-05-01 RX ADMIN — FENTANYL CITRATE 25 MCG: 50 INJECTION, SOLUTION INTRAMUSCULAR; INTRAVENOUS at 07:05

## 2024-05-01 RX ADMIN — PROPOFOL 150 MG: 10 INJECTION, EMULSION INTRAVENOUS at 07:05

## 2024-05-01 RX ADMIN — SODIUM CHLORIDE, SODIUM GLUCONATE, SODIUM ACETATE, POTASSIUM CHLORIDE AND MAGNESIUM CHLORIDE: 526; 502; 368; 37; 30 INJECTION, SOLUTION INTRAVENOUS at 08:05

## 2024-05-01 RX ADMIN — OXYCODONE 5 MG: 5 TABLET ORAL at 09:05

## 2024-05-01 RX ADMIN — SODIUM CHLORIDE, SODIUM GLUCONATE, SODIUM ACETATE, POTASSIUM CHLORIDE AND MAGNESIUM CHLORIDE: 526; 502; 368; 37; 30 INJECTION, SOLUTION INTRAVENOUS at 07:05

## 2024-05-01 RX ADMIN — CLINDAMYCIN PHOSPHATE 900 MG: 18 INJECTION, SOLUTION INTRAVENOUS at 07:05

## 2024-05-01 RX ADMIN — ONDANSETRON 4 MG: 2 INJECTION INTRAMUSCULAR; INTRAVENOUS at 07:05

## 2024-05-01 RX ADMIN — LIDOCAINE HYDROCHLORIDE 60 MG: 20 INJECTION, SOLUTION INTRAVENOUS at 07:05

## 2024-05-01 RX ADMIN — LIDOCAINE HYDROCHLORIDE 10 MG: 10 INJECTION, SOLUTION EPIDURAL; INFILTRATION; INTRACAUDAL; PERINEURAL at 06:05

## 2024-05-01 RX ADMIN — KETOROLAC TROMETHAMINE 15 MG: 30 INJECTION, SOLUTION INTRAMUSCULAR; INTRAVENOUS at 08:05

## 2024-05-01 NOTE — TRANSFER OF CARE
"Anesthesia Transfer of Care Note    Patient: Dustin Owens    Procedure(s) Performed: Procedure(s) (LRB):  EXCISION, LIPOMA (Left)    Patient location: PACU    Anesthesia Type: general    Transport from OR: Transported from OR on 2-3 L/min O2 by NC with adequate spontaneous ventilation    Post pain: adequate analgesia    Post assessment: no apparent anesthetic complications    Post vital signs: stable    Level of consciousness: responds to stimulation and lethargic    Nausea/Vomiting: no nausea/vomiting    Complications: none    Transfer of care protocol was followed      Last vitals: Visit Vitals  BP (!) 159/80   Pulse 70   Temp 36.2 °C (97.2 °F) (Skin)   Resp 15   Ht 5' 9" (1.753 m)   Wt 78 kg (172 lb)   SpO2 100%   BMI 25.40 kg/m²     "

## 2024-05-01 NOTE — INTERVAL H&P NOTE
The patient has been examined and the H&P has been reviewed:    I concur with the findings and no changes have occurred since H&P was written.    Surgery risks, benefits and alternative options discussed and understood by patient/family.    U/S confirms soft tissue lipoma. To OR today for excision.       There are no hospital problems to display for this patient.

## 2024-05-01 NOTE — PROGRESS NOTES
Pt with vagal response during IV insertion LOC less than 1 minute complaining of nausea SBP 60's with heart rate in 40's Placed in trendelenburg IV attempt successful,  IV bolus given Dr Devlin at bedside to see pt . Pt responded with increase in heart rate and BP back to baseline

## 2024-05-01 NOTE — ANESTHESIA PREPROCEDURE EVALUATION
05/01/2024  Dustin Owens is a 68 y.o., male.      Pre-op Assessment    I have reviewed the Patient Summary Reports.     I have reviewed the Nursing Notes. I have reviewed the NPO Status.   I have reviewed the Medications.     Review of Systems  Anesthesia Hx:             Denies Family Hx of Anesthesia complications.    Denies Personal Hx of Anesthesia complications.                    Cardiovascular:                hyperlipidemia                             Pulmonary:    Asthma moderate                   Musculoskeletal:     Left shoulder lipoma       Spine Disorders: cervical Disc disease and Chronic Pain           Endocrine:  Endocrine Normal            Psych:  Psychiatric History  depression                Physical Exam  General: Well nourished, Cooperative, Alert and Oriented    Airway:  Mallampati: III / II  Neck ROM: Extension Decreased    Dental:  Intact, Caps / Implants    Chest/Lungs:  Clear to auscultation, Normal Respiratory Rate    Heart:  Rate: Normal  Rhythm: Regular Rhythm        Anesthesia Plan  Type of Anesthesia, risks & benefits discussed:    Anesthesia Type: Gen Supraglottic Airway, Gen ETT  Intra-op Monitoring Plan: Standard ASA Monitors  Post Op Pain Control Plan: multimodal analgesia and IV/PO Opioids PRN  Induction:  IV and Inhalation  Informed Consent: Informed consent signed with the Patient and all parties understand the risks and agree with anesthesia plan.  All questions answered.   ASA Score: 2    Ready For Surgery From Anesthesia Perspective.     .

## 2024-05-01 NOTE — PLAN OF CARE
Patient awake, alert, oriented. Vital signs stable. Patient verbalizes readiness for discharge. Discharge instructions reviewed with patient and patient's sister. Patient and patient's sister verbalized understanding. IV removed, catheter intact. Incision to L shoulder clean, dry, and intact. LUE arm sling on. All belongings returned to patient. Patient transferred to car via wheelchair. Safety maintained.

## 2024-05-01 NOTE — ANESTHESIA POSTPROCEDURE EVALUATION
Anesthesia Post Evaluation    Patient: Dustin Owens    Procedure(s) Performed: Procedure(s) (LRB):  EXCISION, LIPOMA (Left)    Final Anesthesia Type: general      Patient location during evaluation: PACU  Patient participation: Yes- Able to Participate  Level of consciousness: sedated and awake  Post-procedure vital signs: reviewed and stable  Pain management: adequate  Airway patency: patent    PONV status at discharge: No PONV  Anesthetic complications: no      Cardiovascular status: blood pressure returned to baseline and hemodynamically stable  Respiratory status: spontaneous ventilation  Hydration status: euvolemic  Follow-up not needed.              Vitals Value Taken Time   /61 05/01/24 0927   Temp 36.3 °C (97.3 °F) 05/01/24 0845   Pulse 86 05/01/24 0931   Resp 20 05/01/24 0931   SpO2 96 % 05/01/24 0931   Vitals shown include unfiled device data.      No case tracking events are documented in the log.      Pain/Trent Score: Trent Score: 7 (5/1/2024  9:15 AM)

## 2024-05-01 NOTE — PLAN OF CARE
Pt released per anesthesia. Pt A&Ox3. No nausea or vomiting. Pain controlled. All belongings to postop.

## 2024-05-01 NOTE — DISCHARGE INSTRUCTIONS
"Discharge Instructions: After Your Surgery/Procedure  Youve just had surgery. During surgery you were given medicine called anesthesia to keep you relaxed and free of pain. After surgery you may have some pain or nausea. This is common. Here are some tips for feeling better and getting well after surgery.     Stay on schedule with your medication.   Going home  Your doctor or nurse will show you how to take care of yourself when you go home. He or she will also answer your questions. Have an adult family member or friend drive you home.      For your safety we recommend these precaution for the first 24 hours after your procedure:  Do not drive or use heavy equipment.  Do not make important decisions or sign legal papers.  Do not drink alcohol.  Have someone stay with you, if needed. He or she can watch for problems and help keep you safe.  Your concentration, balance, coordination, and judgement may be impaired for many hours after anesthesia.  Use caution when ambulating or standing up.     You may feel weak and "washed out" after anesthesia and surgery.      Subtle residual effects of general anesthesia or sedation with regional / local anesthesia can last more than 24 hours.  Rest for the remainder of the day or longer if your Doctor/Surgeon has advised you to do so.  Although you may feel normal within the first 24 hours, your reflexes and mental ability may be impaired without you realizing it.  You may feel dizzy, lightheaded or sleepy for 24 hours or longer.      Be sure to go to all follow-up visits with your doctor. And rest after your surgery for as long as your doctor tells you to.  Coping with pain  If you have pain after surgery, pain medicine will help you feel better. Take it as told, before pain becomes severe. Also, ask your doctor or pharmacist about other ways to control pain. This might be with heat, ice, or relaxation. And follow any other instructions your surgeon or nurse gives you.  Tips " for taking pain medicine  To get the best relief possible, remember these points:  Pain medicines can upset your stomach. Taking them with a little food may help.  Most pain relievers taken by mouth need at least 20 to 30 minutes to start to work.  Taking medicine on a schedule can help you remember to take it. Try to time your medicine so that you can take it before starting an activity. This might be before you get dressed, go for a walk, or sit down for dinner.  Constipation is a common side effect of pain medicines. Call your doctor before taking any medicines such as laxatives or stool softeners to help ease constipation. Also ask if you should skip any foods. Drinking lots of fluids and eating foods such as fruits and vegetables that are high in fiber can also help. Remember, do not take laxatives unless your surgeon has prescribed them.  Drinking alcohol and taking pain medicine can cause dizziness and slow your breathing. It can even be deadly. Do not drink alcohol while taking pain medicine.  Pain medicine can make you react more slowly to things. Do not drive or run machinery while taking pain medicine.  Your health care provider may tell you to take acetaminophen to help ease your pain. Ask him or her how much you are supposed to take each day. Acetaminophen or other pain relievers may interact with your prescription medicines or other over-the-counter (OTC) drugs. Some prescription medicines have acetaminophen and other ingredients. Using both prescription and OTC acetaminophen for pain can cause you to overdose. Read the labels on your OTC medicines with care. This will help you to clearly know the list of ingredients, how much to take, and any warnings. It may also help you not take too much acetaminophen. If you have questions or do not understand the information, ask your pharmacist or health care provider to explain it to you before you take the OTC medicine.  Managing nausea  Some people have an  upset stomach after surgery. This is often because of anesthesia, pain, or pain medicine, or the stress of surgery. These tips will help you handle nausea and eat healthy foods as you get better. If you were on a special food plan before surgery, ask your doctor if you should follow it while you get better. These tips may help:  Do not push yourself to eat. Your body will tell you when to eat and how much.  Start off with clear liquids and soup. They are easier to digest.  Next try semi-solid foods, such as mashed potatoes, applesauce, and gelatin, as you feel ready.  Slowly move to solid foods. Dont eat fatty, rich, or spicy foods at first.  Do not force yourself to have 3 large meals a day. Instead eat smaller amounts more often.  Take pain medicines with a small amount of solid food, such as crackers or toast, to avoid nausea.     Call your surgeon if  You still have pain an hour after taking medicine. The medicine may not be strong enough.  You feel too sleepy, dizzy, or groggy. The medicine may be too strong.  You have side effects like nausea, vomiting, or skin changes, such as rash, itching, or hives.       If you have obstructive sleep apnea  You were given anesthesia medicine during surgery to keep you comfortable and free of pain. After surgery, you may have more apnea spells because of this medicine and other medicines you were given. The spells may last longer than usual.   At home:  Keep using the continuous positive airway pressure (CPAP) device when you sleep. Unless your health care provider tells you not to, use it when you sleep, day or night. CPAP is a common device used to treat obstructive sleep apnea.  Talk with your provider before taking any pain medicine, muscle relaxants, or sedatives. Your provider will tell you about the possible dangers of taking these medicines.  © 1289-9646 [employer]. [address] [place] [address], [place] [address]. All rights reserved. This information is  not intended as a substitute for professional medical care. Always follow your healthcare professional's instructions.          Using an Incentive Spirometer    An incentive spirometer is a device that helps you do deep breathing exercises. These exercises expand your lungs, aid in circulation, and help prevent pneumonia. Deep breathing exercises also help you breathe better and improve the function of your lungs by:  Keeping your lungs clear  Strengthening your breathing muscles  Helping prevent respiratory complications or problems  The incentive spirometer gives you a way to take an active part in recover. A nurse or therapist will teach you breathing exercises. To do these exercises, you will breathe in through your mouth and not your nose. The incentive spirometer only works correctly if you breathe in through your mouth.  Steps to clear lungs  Step 1. Exhale normally. Then, inhale normally.  Relax and breathe out.  Step 2. Place your lips tightly around the mouthpiece.  Make sure the device is upright and not tilted.  Step 3. Inhale as much air as you can through the mouthpiece (don't breath through your nose).  Inhale slowly and deeply.  Hold your breath long enough to keep the balls or disk raised for at least 3 to 5 seconds, or as instructed by your healthcare provider.  Some spirometers have an indicator to let you know that you are breathing in too fast. If the indicator goes off, breathe in more slowly.  Step 4. Repeat the exercise regularly.  Do this exercise every hour while you're awake, or as instructed by your healthcare provider.  If you were taught deep breathing and coughing exercises, do them regularly as instructed by your healthcare provider.           Post op instructions for prevention of DVT  What is deep vein thrombosis?  Deep vein thrombosis (DVT) is the medical term for blood clots in the deep veins of the leg.  These blood clots can be dangerous.  A DVT can block a blood vessel and keep  blood from getting where it needs to go.  Another problem is that the clot can travel to other parts of the body such as the lungs.  A clot that travels to the lungs is called a pulmonary embolus (PE) and can cause serious problems with breathing which can lead to death.  Am I at risk for DVT/PE?  If you are not very active, you are at risk of DVT.  Anyone confined to bed, sitting for long periods of time, recovering from surgery, etc. increases the risk of DVT.  Other risk factors are cancer diagnosis, certain medications, estrogen replacement in any form,older age, obesity, pregnancy, smoking, history of clotting disorders, and dehydration.  How will I know if I have a DVT?  Swelling in the lower leg  Pain  Warmth, redness, hardness or bulging of the vein  If you have any of these symptoms, call your doctors office right away.  Some people will not have any symptoms until the clot moves to the lungs.  What are the symptoms of a PE?  Panting, shortness of breath, or trouble breathing  Sharp, knife-like chest pain when you breathe  Coughing or coughing up blood  Rapid heartbeat  If you have any of these symptoms or get worse quickly, call 911 for emergency treatment.  How can I prevent a DVT?  Avoid long periods of inactivity and dont cross your legs--get up and walk around every hour or so.  Stay active--walking after surgery is highly encouraged.  This means you should get out of the house and walk in the neighborhood.  Going up and down stairs will not impair healing (unless advised against such activity by your doctor).    Drink plenty of noncaffeinated, nonalcoholic fluids each day to prevent dehydration.  Wear special support stockings, if they have been advised by your doctor.  If you travel, stop at least once an hour and walk around.  Avoid smoking (assistance with stopping is available through your healthcare provider)  Always notify your doctor if you are not able to follow the post operative  instructions that are given to you at the time of discharge.  It may be necessary to prescribe one of the medications available to prevent DVT.          We hope your stay was comfortable as you heal now, mend and rest.    For we have enjoyed taking care of you by giving your our best.    And as you get better, by regaining your health and strength;   We count it as a privilege to have served you and hope your time at [facility] was well spent.      Thank  You!!!

## 2024-05-01 NOTE — BRIEF OP NOTE
[facility]  Brief Operative Note    Surgery Date: 5/1/2024     Surgeons and Role:     * Philipp Chavez MD - Primary    Assisting Surgeon: None    Pre-op Diagnosis:  Lump of skin of left upper extremity [R22.32]  Pre-op testing [Z01.818]    Post-op Diagnosis:  Post-Op Diagnosis Codes:     * Lump of skin of left upper extremity [R22.32]     * Pre-op testing [Z01.818]    Procedure(s) (LRB):  EXCISION, LIPOMA (Left)    Anesthesia: Choice    Operative Findings: lipoma left posterior shoulder/axilla    Estimated Blood Loss: * No values recorded between 5/1/2024  8:10 AM and 5/1/2024  8:33 AM *         Specimens:   Specimen (24h ago, onward)       Start     Ordered    05/01/24 0820  Specimen to Pathology - Surgery  Once        Comments: Pre-op Diagnosis: Lump of skin of left upper extremity [R22.32]Pre-op testing [Z01.818]Procedure(s):EXCISION, LIPOMA Number of specimens: 1Name of specimens: Left Shoulder Lipoma     Question:  Release to patient  Answer:  Immediate    05/01/24 0825                      Discharge Note    OUTCOME: Patient tolerated treatment/procedure well without complication and is now ready for discharge.    DISPOSITION: Home or Self Care    FINAL DIAGNOSIS:  <principal problem not specified>    FOLLOWUP: In clinic    DISCHARGE INSTRUCTIONS:  No discharge procedures on file.

## 2024-05-01 NOTE — ANESTHESIA PROCEDURE NOTES
Intubation    Date/Time: 5/1/2024 7:49 AM    Performed by: Rosalina Colon CRNA  Authorized by: Lenny Devlin MD    Intubation:     Induction:  Intravenous    Mask Ventilation:  Not attempted    Attempts:  1    Attempted By:  CRNA    Difficult Airway Encountered?: No      Complications:  None    Airway Device:  Supraglottic airway/LMA    Airway Device Size:  4.0    Secured at:  The lips    Placement Verified By:  Capnometry    Complicating Factors:  None    Findings Post-Intubation:  BS equal bilateral and atraumatic/condition of teeth unchanged

## 2024-05-01 NOTE — OP NOTE
05/01/2024    PREOPERATIVE DIAGNOSIS:      Lipoma left shoulder    POSTOPERATIVE DIAGNOSIS: Same    PROCEDURE:      Mass excision left shoulder      SURGEON: Philipp Chavez MD    ASSISTANT: Jun PEPPER    He was present and scrubbed for the entirety of the procedure. They were required for patient positioning, retraction, insertion of implants and closure. Without him I would have been unable to safely perform the case due to only one scrub tech available.     ANESTHESIA:  general     ESTIMATED BLOOD LOSS:  10 mL    INDICATIONS:  Dustin Owens is a 68 y.o. year-old male who presented to my clinic with a chief complaint of left posterior shoulder mass. Imaging revealed a large posterior shoulder lipoma.  He elected to proceed with excision.      We discussed the risks and benefits of the surgery in detail including stiffness, CRPS, damage to surrounding neurovascular structures, failure of fixation as well as need for revision surgery. Despite these risks they elected to proceed.       COMPONENTS USED:      * No implants in log *      DESCRIPTION OF PROCEDURE:  The patient was seen in the pre-operative area where consents were verified and the surgical site marked. They did not receive a preoperative block for intra-operative and postoperative analgesia. The patient was taken to the Operating Room where anesthesia was administered by the Anesthesia Department. He was then placed in the lateral decubitus  position and all superficial neurovascular structures were well padded.  The left shoulder and posterior shoulder  was then sterilely prepped and draped in the normal fashion.  Preoperative antibiotics were administered.  A time-out was performed verifying the procedure and laterality, all agreed and elected to proceed as planned.    We made a transverse incision over the mass.  This was about 5 cm in diameter and was superficial to the deep fascia, and had the consistency of a lipoma.  Dissection was  carried down to the superficial fascial plane.  This was then opened and the mass was identified.  This was clearly a lipoma.  It abutted the posterior deep fascia.  Blunt finger dissection was used around the edges of the lipoma and we were able to get underneath it.  There were adhesions deep which were ligated using a electrocautery.  After careful dissection we are able to mobilize the entirety of the mass in 1 piece and this was placed on the back table and sent for specimen/pathology.  Copious irrigation was performed.  We made sure that we had the entirety of the mass excised. Marcaine was used for analgesia and the deep fasical layer was closed with 0 vicryl, the subcutaneous layer with 2-0 and the skin with monocryl.     Disposition:      NWB x 2 weeks, ok for gentle ROM. Follow up 2 weeks and follow up path results.     Philipp Chavez MD

## 2024-05-01 NOTE — PLAN OF CARE
Pt prepared for surgery. Pt resting in bed, with family at bedside. Family signed up for text messaging. Belongings brought over to post op cabinet. IS teaching performed. Fall risk  armband placed. Allergy armband placed. SCDs on.

## 2024-05-02 VITALS
WEIGHT: 172 LBS | BODY MASS INDEX: 25.48 KG/M2 | HEIGHT: 69 IN | TEMPERATURE: 98 F | SYSTOLIC BLOOD PRESSURE: 145 MMHG | DIASTOLIC BLOOD PRESSURE: 82 MMHG | RESPIRATION RATE: 16 BRPM | HEART RATE: 82 BPM | OXYGEN SATURATION: 97 %

## 2024-05-02 NOTE — PROGRESS NOTES
5/2/24 Very pleased with care given.  States all staff were excellent.  States he has read and understands all discharge instructions.

## 2024-05-08 ENCOUNTER — TELEPHONE (OUTPATIENT)
Dept: ORTHOPEDICS | Facility: CLINIC | Age: 69
End: 2024-05-08
Payer: MEDICARE

## 2024-05-08 NOTE — TELEPHONE ENCOUNTER
----- Message from Melina Moreno sent at 5/8/2024  1:14 PM CDT -----  Came by to state that he needs to discontinue therapy sessions for about two weeks due to other unforseen circumstances. Please call patient to discuss matter.

## 2024-06-04 ENCOUNTER — OFFICE VISIT (OUTPATIENT)
Dept: ORTHOPEDICS | Facility: CLINIC | Age: 69
End: 2024-06-04
Payer: MEDICARE

## 2024-06-04 VITALS — BODY MASS INDEX: 25.47 KG/M2 | WEIGHT: 171.94 LBS | HEIGHT: 69 IN

## 2024-06-04 DIAGNOSIS — D17.22 LIPOMA OF LEFT SHOULDER: Primary | ICD-10-CM

## 2024-06-04 PROCEDURE — 99999 PR PBB SHADOW E&M-EST. PATIENT-LVL II: CPT | Mod: PBBFAC,,, | Performed by: ORTHOPAEDIC SURGERY

## 2024-06-04 PROCEDURE — 99212 OFFICE O/P EST SF 10 MIN: CPT | Mod: PBBFAC,PO | Performed by: ORTHOPAEDIC SURGERY

## 2024-06-04 PROCEDURE — 99024 POSTOP FOLLOW-UP VISIT: CPT | Mod: POP,,, | Performed by: ORTHOPAEDIC SURGERY

## 2024-06-04 NOTE — PROGRESS NOTES
Post-op Note    HPI    Dustin Owens is here 4 weeks s/p the following procedure:     Lipoma excision left shoulder    Overall doing well.  Pain control.  He has no significant pain today.  Shoulder pain is much better.  No drainage from the incision.  Healed nicely.      Physical Exam:     Patient is alert and oriented no acute distress.   Assistive Device:  None    Left posterior shoulder Incision(s) are well healed.  No seroma or abscess.  There is no evidence of dehiscence.  There is no induration erythema or signs of infection.  No significant soft tissue swelling.  Compartments are soft and compressible.  Warm well-perfused extremity.    Pathology:     Pathology reviewed consistent with lipoma, benign    Assessment    Dustin Owens is 4 weeks Post-op     Plan:    Overall doing well.  Discussed his pathology results which show benign lipoma.  I have no restrictions.  He can follow up as needed

## 2024-06-10 ENCOUNTER — LAB VISIT (OUTPATIENT)
Dept: LAB | Facility: HOSPITAL | Age: 69
End: 2024-06-10
Attending: STUDENT IN AN ORGANIZED HEALTH CARE EDUCATION/TRAINING PROGRAM
Payer: MEDICARE

## 2024-06-10 ENCOUNTER — OFFICE VISIT (OUTPATIENT)
Dept: FAMILY MEDICINE | Facility: CLINIC | Age: 69
End: 2024-06-10
Payer: MEDICARE

## 2024-06-10 VITALS
RESPIRATION RATE: 17 BRPM | TEMPERATURE: 99 F | HEIGHT: 69 IN | SYSTOLIC BLOOD PRESSURE: 114 MMHG | DIASTOLIC BLOOD PRESSURE: 64 MMHG | OXYGEN SATURATION: 98 % | WEIGHT: 169.75 LBS | HEART RATE: 78 BPM | BODY MASS INDEX: 25.14 KG/M2

## 2024-06-10 DIAGNOSIS — R79.9 ABNORMAL FINDING OF BLOOD CHEMISTRY, UNSPECIFIED: ICD-10-CM

## 2024-06-10 DIAGNOSIS — G47.09 OTHER INSOMNIA: ICD-10-CM

## 2024-06-10 DIAGNOSIS — R21 RASH: ICD-10-CM

## 2024-06-10 DIAGNOSIS — Z12.5 ENCOUNTER FOR PROSTATE CANCER SCREENING: ICD-10-CM

## 2024-06-10 DIAGNOSIS — N52.9 ERECTILE DYSFUNCTION, UNSPECIFIED ERECTILE DYSFUNCTION TYPE: ICD-10-CM

## 2024-06-10 DIAGNOSIS — R73.03 PREDIABETES: ICD-10-CM

## 2024-06-10 DIAGNOSIS — Z00.00 HEALTHCARE MAINTENANCE: Primary | ICD-10-CM

## 2024-06-10 DIAGNOSIS — E78.5 HYPERLIPIDEMIA, UNSPECIFIED HYPERLIPIDEMIA TYPE: ICD-10-CM

## 2024-06-10 DIAGNOSIS — M54.9 BACK PAIN, UNSPECIFIED BACK LOCATION, UNSPECIFIED BACK PAIN LATERALITY, UNSPECIFIED CHRONICITY: ICD-10-CM

## 2024-06-10 DIAGNOSIS — J30.1 NON-SEASONAL ALLERGIC RHINITIS DUE TO POLLEN: ICD-10-CM

## 2024-06-10 LAB
ALBUMIN SERPL BCP-MCNC: 3.8 G/DL (ref 3.5–5.2)
ALP SERPL-CCNC: 82 U/L (ref 55–135)
ALT SERPL W/O P-5'-P-CCNC: 76 U/L (ref 10–44)
ANION GAP SERPL CALC-SCNC: 12 MMOL/L (ref 8–16)
AST SERPL-CCNC: 153 U/L (ref 10–40)
BILIRUB SERPL-MCNC: 0.5 MG/DL (ref 0.1–1)
BUN SERPL-MCNC: 14 MG/DL (ref 8–23)
CALCIUM SERPL-MCNC: 9.7 MG/DL (ref 8.7–10.5)
CHLORIDE SERPL-SCNC: 106 MMOL/L (ref 95–110)
CHOLEST SERPL-MCNC: 182 MG/DL (ref 120–199)
CHOLEST/HDLC SERPL: 4.7 {RATIO} (ref 2–5)
CO2 SERPL-SCNC: 22 MMOL/L (ref 23–29)
COMPLEXED PSA SERPL-MCNC: 0.47 NG/ML (ref 0–4)
CREAT SERPL-MCNC: 1.3 MG/DL (ref 0.5–1.4)
EST. GFR  (NO RACE VARIABLE): >60 ML/MIN/1.73 M^2
ESTIMATED AVG GLUCOSE: 117 MG/DL (ref 68–131)
GLUCOSE SERPL-MCNC: 105 MG/DL (ref 70–110)
HBA1C MFR BLD: 5.7 % (ref 4–5.6)
HDLC SERPL-MCNC: 39 MG/DL (ref 40–75)
HDLC SERPL: 21.4 % (ref 20–50)
LDLC SERPL CALC-MCNC: 119.6 MG/DL (ref 63–159)
NONHDLC SERPL-MCNC: 143 MG/DL
POTASSIUM SERPL-SCNC: 4.4 MMOL/L (ref 3.5–5.1)
PROT SERPL-MCNC: 7.1 G/DL (ref 6–8.4)
SODIUM SERPL-SCNC: 140 MMOL/L (ref 136–145)
TREPONEMA PALLIDUM IGG+IGM AB [PRESENCE] IN SERUM OR PLASMA BY IMMUNOASSAY: NONREACTIVE
TRIGL SERPL-MCNC: 117 MG/DL (ref 30–150)
TSH SERPL DL<=0.005 MIU/L-ACNC: 1.55 UIU/ML (ref 0.4–4)

## 2024-06-10 PROCEDURE — 80061 LIPID PANEL: CPT | Performed by: STUDENT IN AN ORGANIZED HEALTH CARE EDUCATION/TRAINING PROGRAM

## 2024-06-10 PROCEDURE — 36415 COLL VENOUS BLD VENIPUNCTURE: CPT | Mod: PO | Performed by: STUDENT IN AN ORGANIZED HEALTH CARE EDUCATION/TRAINING PROGRAM

## 2024-06-10 PROCEDURE — 99999 PR PBB SHADOW E&M-EST. PATIENT-LVL IV: CPT | Mod: PBBFAC,,, | Performed by: STUDENT IN AN ORGANIZED HEALTH CARE EDUCATION/TRAINING PROGRAM

## 2024-06-10 PROCEDURE — 84443 ASSAY THYROID STIM HORMONE: CPT | Performed by: STUDENT IN AN ORGANIZED HEALTH CARE EDUCATION/TRAINING PROGRAM

## 2024-06-10 PROCEDURE — 99214 OFFICE O/P EST MOD 30 MIN: CPT | Mod: PBBFAC,PO | Performed by: STUDENT IN AN ORGANIZED HEALTH CARE EDUCATION/TRAINING PROGRAM

## 2024-06-10 PROCEDURE — 80061 LIPID PANEL: CPT | Mod: 91 | Performed by: STUDENT IN AN ORGANIZED HEALTH CARE EDUCATION/TRAINING PROGRAM

## 2024-06-10 PROCEDURE — 86593 SYPHILIS TEST NON-TREP QUANT: CPT | Performed by: STUDENT IN AN ORGANIZED HEALTH CARE EDUCATION/TRAINING PROGRAM

## 2024-06-10 PROCEDURE — 99214 OFFICE O/P EST MOD 30 MIN: CPT | Mod: S$PBB,,, | Performed by: STUDENT IN AN ORGANIZED HEALTH CARE EDUCATION/TRAINING PROGRAM

## 2024-06-10 PROCEDURE — 84153 ASSAY OF PSA TOTAL: CPT | Performed by: STUDENT IN AN ORGANIZED HEALTH CARE EDUCATION/TRAINING PROGRAM

## 2024-06-10 PROCEDURE — 80053 COMPREHEN METABOLIC PANEL: CPT | Performed by: STUDENT IN AN ORGANIZED HEALTH CARE EDUCATION/TRAINING PROGRAM

## 2024-06-10 PROCEDURE — G2211 COMPLEX E/M VISIT ADD ON: HCPCS | Mod: S$PBB,,, | Performed by: STUDENT IN AN ORGANIZED HEALTH CARE EDUCATION/TRAINING PROGRAM

## 2024-06-10 PROCEDURE — 83036 HEMOGLOBIN GLYCOSYLATED A1C: CPT | Performed by: STUDENT IN AN ORGANIZED HEALTH CARE EDUCATION/TRAINING PROGRAM

## 2024-06-10 RX ORDER — TIZANIDINE 2 MG/1
2 TABLET ORAL EVERY 8 HOURS PRN
Qty: 60 TABLET | Refills: 1 | Status: SHIPPED | OUTPATIENT
Start: 2024-06-10 | End: 2024-07-10

## 2024-06-10 NOTE — PATIENT INSTRUCTIONS
Natanael Chan,     If you are due for any health screening(s) below please notify me so we can arrange them to be ordered and scheduled. Most healthy patients at your age complete them, but you are free to accept or refuse.     If you can't do it, I'll definitely understand. If you can, I'd certainly appreciate it!    All of your core healthy metrics are met.

## 2024-06-10 NOTE — PROGRESS NOTES
[facility] Note    Subjective:    The HPI and pertinent ROS is included in the Diagnostic Impression Remarks section at the end of the note. Please see below for further details. Chief complaint is at end of note.     Dustin is a pleasant intelligent patient who is here for evaluation.     Modified Medications    No medications on file       Data reviewed 274}  Previous medical records reviewed and summarized in plan section at end of note.      If you are due for any health screening(s) below please notify me so we can arrange them to be ordered and scheduled. Most healthy patients at your age complete them, but you are free to accept or refuse. If you can't do it, I'll definitely understand. If you can, I'd certainly appreciate it!     All of your core healthy metrics are met.      The following portions of the patient's history were reviewed and updated as appropriate: allergies, current medications, past family history, past medical history, past social history, past surgical history and problem list.    He  has a past medical history of Asthma, Colon polyp, and Personal history of colonic polyps (07/11/2018).  He  has a past surgical history that includes Colonoscopy; Colonoscopy (N/A, 03/27/2023); Sinus surgery; and Lipoma resection (Left, 5/1/2024).    He  reports that he has quit smoking. His smoking use included cigarettes. He has been exposed to tobacco smoke. He has never used smokeless tobacco. He reports that he does not currently use alcohol. He reports that he does not use drugs.  He family history includes Cancer in his mother; Diabetes in his father.    Review of patient's allergies indicates:   Allergen Reactions    Amoxicillin Diarrhea    Pollen, micronized Other (See Comments)     SNEEZING       Tobacco Use: Medium Risk (6/10/2024)    Patient History     Smoking Tobacco Use: Former     Smokeless Tobacco Use: Never     Passive Exposure: Past     Physical Examination  Physical  "Exam  Vital Signs  The patient's blood pressure is 114/64.     General appearance: alert, cooperative, no distress  Neck: no thyromegaly, no neck stiffness  Lungs: clear to auscultation, no wheezes, rales or rhonchi, symmetric air entry  Heart: normal rate, regular rhythm, normal S1, S2, no murmurs, rubs, clicks or gallops  Abdomen: soft, nontender, nondistended, no rigidity, rebound, or guarding.   Back: no point tenderness over spine  Extremities: peripheral pulses normal, no unilateral leg swelling or calf tenderness   Neurological:alert, oriented, normal speech, no new focal findings or movement disorder noted from baseline      BP Readings from Last 3 Encounters:   06/10/24 114/64   05/01/24 (!) 145/82   03/28/24 124/68     Wt Readings from Last 3 Encounters:   06/10/24 77 kg (169 lb 12.1 oz)   06/04/24 78 kg (171 lb 15.3 oz)   05/01/24 78 kg (172 lb)     /64 (BP Location: Right arm, Patient Position: Sitting, BP Method: Large (Manual))   Pulse 78   Temp 98.8 °F (37.1 °C) (Oral)   Resp 17   Ht 5' 9" (1.753 m)   Wt 77 kg (169 lb 12.1 oz)   SpO2 98%   BMI 25.07 kg/m²    274}  Laboratory: I have reviewed old labs below:    274}    Lab Results   Component Value Date    WBC 7.66 04/24/2024    HGB 15.0 04/24/2024    HCT 46.5 04/24/2024    MCV 88 04/24/2024     04/24/2024     04/24/2024    K 4.3 04/24/2024     04/24/2024    CALCIUM 9.8 04/24/2024    CO2 27 04/24/2024    GLU 97 04/24/2024    BUN 14 04/24/2024    CREATININE 1.4 04/24/2024    EGFRNORACEVR 55 (A) 04/24/2024    ANIONGAP 8 04/24/2024    PROT 7.2 07/11/2023    ALBUMIN 3.8 07/11/2023    BILITOT 0.5 07/11/2023    ALKPHOS 81 07/11/2023    ALT 36 07/11/2023    AST 24 07/11/2023    CHOL 177 06/08/2023    TRIG 191 (H) 06/08/2023    HDL 38 (L) 06/08/2023    LDLCALC 100.8 06/08/2023    TSH 1.92 08/12/2008    PSA 0.65 06/08/2023    HGBA1C 5.7 (H) 06/08/2023      Lab reviewed by me: Particular labs of significance that I will monitor, " workup, or treat to improve are mentioned below in diagnostic impression remarks.    Results  Laboratory Studies  Blood count was normal. Electrolytes were normal.       Imaging/EKG: I have reviewed the pertinent results and my findings are noted in remarks.  274}    CC:   Chief Complaint   Patient presents with    Annual Exam        274}    History of Present Illness  The patient is a 68-year-old male who is here for follow-up.    The patient underwent blood work approximately a month ago. He experienced an adverse reaction, characterized by a rash that extended across his chest, stomach, and back, which he initially attributed to a medication he was utilizing as needed. Upon discontinuation of the lotion, the rash ceased.    The patient has been experiencing persistent pain in his lower back and neck for the past 2 years. The pain, described as a burning sensation, is particularly bothersome upon rising in the morning. Despite the pain, he does not express a desire to commence pharmacological intervention. He has attempted to manage the pain with various treatments.    The patient reports sleep disturbances. He was previously prescribed trazodone 50 mg, which induced drowsiness, prompting him to discontinue its use.    The patient has previously tried Cialis, which resulted in severe headaches.       Assessment/Plan  Dustin Owens is a 68 y.o. male who presents to clinic with:  1. Healthcare maintenance    2. Hyperlipidemia, unspecified hyperlipidemia type    3. Prediabetes    4. Non-seasonal allergic rhinitis due to pollen    5. Encounter for prostate cancer screening    6. Back pain, unspecified back location, unspecified back pain laterality, unspecified chronicity    7. Abnormal finding of blood chemistry, unspecified    8. Other insomnia    9. Erectile dysfunction, unspecified erectile dysfunction type    10. Rash       274}    Assessment & Plan  1. Preventive care.  A comprehensive blood work will be  conducted to monitor the patient's condition.    2. Back pain.  The patient expressed a desire to defer any daily medication at this time. The patient is advised to take NSAIDs as needed. A muscle relaxer will be prescribed. Should the patient require improvement, physical therapy is recommended.    3. Erectile dysfunction.  The patient acknowledges a need for improvement. He previously experienced headaches with Cialis and Viagra. An alternative PDE4 inhibitor was discussed, but due to the cost, the decision was made to defer this option. A consultation with a urologist was suggested.    4. Insomnia.  The patient is experiencing difficulty sleeping. The possibility of nightly glycolic acid 3 g along with other supplements was discussed. However, the patient was advised to defer medications, having previously tried them.    5. Prediabetes.  The patient was advised to adhere to a low glycemic, low diet. A recheck of the patient's blood work will be conducted.    6. Hyperlipidemia.  The patient's hyperlipidemia is well-managed. The patient is advised to continue with his statin regimen. The patient's blood work will be rechecked.         This note was generated with the assistance of ambient listening technology. Verbal consent was obtained by the patient and accompanying visitor(s) for the recording of patient appointment to facilitate this note. I attest to having reviewed and edited the generated note for accuracy, though some syntax or spelling errors may persist. Please contact the author of this note for any clarification.      1. Healthcare maintenance    2. Hyperlipidemia, unspecified hyperlipidemia type  - Comprehensive Metabolic Panel; Future  - Hemoglobin A1C; Future  - TSH; Future  - Lipid Panel; Future  - PSA, Screening; Future  - Apolipoprotein B; Future  - Lipoprotein Analysis, by NMR; Future    3. Prediabetes  - Comprehensive Metabolic Panel; Future  - Hemoglobin A1C; Future  - TSH; Future  - Lipid  Panel; Future  - PSA, Screening; Future    4. Non-seasonal allergic rhinitis due to pollen    5. Encounter for prostate cancer screening  - PSA, Screening; Future    6. Back pain, unspecified back location, unspecified back pain laterality, unspecified chronicity  - tiZANidine (ZANAFLEX) 2 MG tablet; Take 1 tablet (2 mg total) by mouth every 8 (eight) hours as needed (muscle spasms).  Dispense: 60 tablet; Refill: 1    7. Abnormal finding of blood chemistry, unspecified  - Comprehensive Metabolic Panel; Future  - Hemoglobin A1C; Future  - TSH; Future  - Lipid Panel; Future    8. Other insomnia    9. Erectile dysfunction, unspecified erectile dysfunction type    10. Rash  - Treponema Pallidium Antibodies IgG, IgM; Future      Jimbo Howe MD   274}    If you are due for any health screening(s) below please notify me so we can arrange them to be ordered and scheduled. Most healthy patients at your age complete them, but you are free to accept or refuse.     If you can't do it, I'll definitely understand. If you can, I'd certainly appreciate it!   All of your core healthy metrics are met.

## 2024-06-11 ENCOUNTER — PATIENT MESSAGE (OUTPATIENT)
Dept: FAMILY MEDICINE | Facility: CLINIC | Age: 69
End: 2024-06-11
Payer: MEDICARE

## 2024-06-11 DIAGNOSIS — R94.5 ABNORMAL RESULTS OF LIVER FUNCTION STUDIES: Primary | ICD-10-CM

## 2024-06-12 ENCOUNTER — PATIENT MESSAGE (OUTPATIENT)
Dept: FAMILY MEDICINE | Facility: CLINIC | Age: 69
End: 2024-06-12
Payer: MEDICARE

## 2024-06-12 DIAGNOSIS — E78.2 MIXED HYPERLIPIDEMIA: ICD-10-CM

## 2024-06-12 DIAGNOSIS — Z13.6 ENCOUNTER FOR SCREENING FOR CARDIOVASCULAR DISORDERS: Primary | ICD-10-CM

## 2024-06-12 DIAGNOSIS — R79.9 ABNORMAL FINDING OF BLOOD CHEMISTRY, UNSPECIFIED: ICD-10-CM

## 2024-06-12 LAB — APO B SERPL-MCNC: 103 MG/DL

## 2024-06-13 ENCOUNTER — HOSPITAL ENCOUNTER (OUTPATIENT)
Dept: RADIOLOGY | Facility: HOSPITAL | Age: 69
Discharge: HOME | End: 2024-06-13
Attending: STUDENT IN AN ORGANIZED HEALTH CARE EDUCATION/TRAINING PROGRAM
Payer: MEDICARE

## 2024-06-13 DIAGNOSIS — E78.2 MIXED HYPERLIPIDEMIA: ICD-10-CM

## 2024-06-13 DIAGNOSIS — R79.9 ABNORMAL FINDING OF BLOOD CHEMISTRY, UNSPECIFIED: ICD-10-CM

## 2024-06-13 DIAGNOSIS — Z13.6 ENCOUNTER FOR SCREENING FOR CARDIOVASCULAR DISORDERS: ICD-10-CM

## 2024-06-13 PROCEDURE — 75571 CT HRT W/O DYE W/CA TEST: CPT | Mod: TC

## 2024-06-13 PROCEDURE — 75571 CT HRT W/O DYE W/CA TEST: CPT | Mod: 26,,, | Performed by: RADIOLOGY

## 2024-06-15 LAB
CHOLEST SERPL-MCNC: 204 MG/DL
HDL SERPL QN: 8.2 NM
HDL SERPL-SCNC: 32.3 UMOL/L
HDLC SERPL-MCNC: 43 MG/DL (ref 40–59)
HLD.LARGE SERPL-SCNC: <2.8 UMOL/L
LDL SERPL QN: 20.1 NM
LDL SERPL-SCNC: 1868 NMOL/L
LDL SMALL SERPL-SCNC: >1085 NMOL/L
LDLC SERPL CALC-MCNC: 134 MG/DL
PATHOLOGY STUDY: ABNORMAL
TRIGL SERPL-MCNC: 133 MG/DL (ref 30–149)
VLDL LARGE SERPL-SCNC: 1.8 NMOL/L
VLDL SERPL QN: 49.5 NM

## 2024-06-17 ENCOUNTER — TELEPHONE (OUTPATIENT)
Dept: FAMILY MEDICINE | Facility: CLINIC | Age: 69
End: 2024-06-17
Payer: MEDICARE

## 2024-06-17 ENCOUNTER — LAB VISIT (OUTPATIENT)
Dept: LAB | Facility: HOSPITAL | Age: 69
End: 2024-06-17
Attending: STUDENT IN AN ORGANIZED HEALTH CARE EDUCATION/TRAINING PROGRAM
Payer: MEDICARE

## 2024-06-17 DIAGNOSIS — E78.2 MIXED HYPERLIPIDEMIA: Primary | ICD-10-CM

## 2024-06-17 DIAGNOSIS — R94.5 ABNORMAL RESULTS OF LIVER FUNCTION STUDIES: ICD-10-CM

## 2024-06-17 LAB
ALBUMIN SERPL BCP-MCNC: 3.9 G/DL (ref 3.5–5.2)
ALP SERPL-CCNC: 75 U/L (ref 55–135)
ALT SERPL W/O P-5'-P-CCNC: 40 U/L (ref 10–44)
AST SERPL-CCNC: 26 U/L (ref 10–40)
BILIRUB DIRECT SERPL-MCNC: 0.2 MG/DL (ref 0.1–0.3)
BILIRUB SERPL-MCNC: 0.6 MG/DL (ref 0.1–1)
HAV IGM SERPL QL IA: NORMAL
HBV CORE IGM SERPL QL IA: NORMAL
HBV SURFACE AG SERPL QL IA: NORMAL
HCV AB SERPL QL IA: NORMAL
PROT SERPL-MCNC: 7.1 G/DL (ref 6–8.4)

## 2024-06-17 PROCEDURE — 80076 HEPATIC FUNCTION PANEL: CPT | Performed by: STUDENT IN AN ORGANIZED HEALTH CARE EDUCATION/TRAINING PROGRAM

## 2024-06-17 PROCEDURE — 99452 NTRPROF PH1/NTRNET/EHR RFRL: CPT | Mod: S$PBB,,, | Performed by: STUDENT IN AN ORGANIZED HEALTH CARE EDUCATION/TRAINING PROGRAM

## 2024-06-17 PROCEDURE — 36415 COLL VENOUS BLD VENIPUNCTURE: CPT | Mod: PO | Performed by: STUDENT IN AN ORGANIZED HEALTH CARE EDUCATION/TRAINING PROGRAM

## 2024-06-17 PROCEDURE — 80074 ACUTE HEPATITIS PANEL: CPT | Performed by: STUDENT IN AN ORGANIZED HEALTH CARE EDUCATION/TRAINING PROGRAM

## 2024-06-17 NOTE — TELEPHONE ENCOUNTER
----- Message from Jimbo Howe MD sent at 6/17/2024  1:56 PM CDT -----  Would recommend an econsult to cardiologist to get their thoughts if he is open can place order

## 2024-06-17 NOTE — TELEPHONE ENCOUNTER
----- Message from Vick Mendenhall sent at 6/17/2024  3:16 PM CDT -----  Contact: Self  Type:  Patient Returning Call    Who Called:  Patient  Who Left Message for Patient:  Zaira  Does the patient know what this is regarding?:  Yes  Best Call Back Number:  [phone]  Additional Information:  Patient returning phone call

## 2024-07-08 ENCOUNTER — OFFICE VISIT (OUTPATIENT)
Dept: URGENT CARE | Facility: CLINIC | Age: 69
End: 2024-07-08
Payer: MEDICARE

## 2024-07-08 VITALS
RESPIRATION RATE: 18 BRPM | BODY MASS INDEX: 25.03 KG/M2 | HEART RATE: 71 BPM | WEIGHT: 169 LBS | TEMPERATURE: 98 F | HEIGHT: 69 IN | OXYGEN SATURATION: 99 % | SYSTOLIC BLOOD PRESSURE: 122 MMHG | DIASTOLIC BLOOD PRESSURE: 79 MMHG

## 2024-07-08 DIAGNOSIS — J01.00 ACUTE NON-RECURRENT MAXILLARY SINUSITIS: ICD-10-CM

## 2024-07-08 DIAGNOSIS — R05.9 COUGH, UNSPECIFIED TYPE: Primary | ICD-10-CM

## 2024-07-08 PROCEDURE — 71046 X-RAY EXAM CHEST 2 VIEWS: CPT | Mod: S$GLB,,, | Performed by: RADIOLOGY

## 2024-07-08 PROCEDURE — 99214 OFFICE O/P EST MOD 30 MIN: CPT | Mod: S$GLB,,, | Performed by: PHYSICIAN ASSISTANT

## 2024-07-08 RX ORDER — GUAIFENESIN 100 MG/5ML
200 SOLUTION ORAL 3 TIMES DAILY PRN
Qty: 236 ML | Refills: 0 | Status: SHIPPED | OUTPATIENT
Start: 2024-07-08 | End: 2024-07-18

## 2024-07-08 RX ORDER — DOXYCYCLINE HYCLATE 100 MG
100 TABLET ORAL 2 TIMES DAILY
Qty: 20 TABLET | Refills: 0 | Status: SHIPPED | OUTPATIENT
Start: 2024-07-08 | End: 2024-07-18

## 2024-07-08 NOTE — PROGRESS NOTES
"Subjective:      Patient ID: Dustin Owens is a 68 y.o. male.    Vitals:  height is 5' 9" (1.753 m) and weight is 76.7 kg (169 lb). His oral temperature is 98.1 °F (36.7 °C). His blood pressure is 122/79 and his pulse is 71. His respiration is 18 and oxygen saturation is 99%.     Chief Complaint: Sinus Problem    Patient is a 68-year-old male who presents with sinus congestion for weeks.  He has past medical history significant for anxiety, hyperlipidemia and allergies.  Reports associated headache and head pressure.  Reports this morning he coughed and had a small amount of blood in his cough which prompted the visit.  Denies fevers. He states he does not take blood thinners. He has been using a nasal spray at home.          Constitution: Negative for chills and fever.   HENT:  Positive for sinus pain and sinus pressure. Negative for ear pain, ear discharge and sore throat.    Respiratory:  Positive for cough. Negative for shortness of breath.    Gastrointestinal:  Negative for abdominal pain, nausea, vomiting and diarrhea.      Objective:     Physical Exam   HENT:   Head: Normocephalic and atraumatic.   Ears:   Right Ear: Tympanic membrane and ear canal normal.   Left Ear: Tympanic membrane and ear canal normal.   Nose: Right sinus exhibits maxillary sinus tenderness and frontal sinus tenderness. Left sinus exhibits maxillary sinus tenderness and frontal sinus tenderness.   Mouth/Throat: Uvula is midline, oropharynx is clear and moist and mucous membranes are normal. Mucous membranes are moist. No oropharyngeal exudate or posterior oropharyngeal erythema. Oropharynx is clear.   Eyes: Conjunctivae are normal.   Cardiovascular: Normal rate and normal heart sounds.   Pulmonary/Chest: Effort normal and breath sounds normal. No stridor. He has no decreased breath sounds. He has no wheezes.   Neurological: He is alert.   Nursing note and vitals reviewed.      Assessment:     1. Cough, unspecified type    2. Acute " non-recurrent maxillary sinusitis        Plan:       Cough, unspecified type  -     XR CHEST PA AND LATERAL; Future; Expected date: 07/08/2024    Acute non-recurrent maxillary sinusitis  -     doxycycline (VIBRA-TABS) 100 MG tablet; Take 1 tablet (100 mg total) by mouth 2 (two) times daily. for 10 days  Dispense: 20 tablet; Refill: 0  -     guaiFENesin 100 mg/5 ml (ROBITUSSIN) 100 mg/5 mL syrup; Take 10 mLs (200 mg total) by mouth 3 (three) times daily as needed for Cough.  Dispense: 236 mL; Refill: 0          Medical Decision Making:   History:   Old Medical Records: I decided to obtain old medical records.  Clinical Tests:   Radiological Study: Ordered and Reviewed  Urgent Care Management:  Urgent evaluation of a well appearing 68 year old male who presents with cough, congestion and sinus pressure for about two weeks. Vital signs are stable. Clear and equal breath sounds bilaterally. Oxygen saturation is stable. I doubt pneumonia. No sign of otitis media. Denied GI complaints. Symptoms have been presents for weeks, will treat with antibiotics. Discussed results with patient. Return precautions given. Based on my clinical evaluation, I do not appreciate any immediate, emergent, or life threatening condition or etiology that warrants additional workup today and feel that the patient can be discharged with close follow up care.  Patient is to follow up with their primary care provider. All questions answered.

## 2024-08-12 ENCOUNTER — OFFICE VISIT (OUTPATIENT)
Dept: FAMILY MEDICINE | Facility: CLINIC | Age: 69
End: 2024-08-12
Payer: MEDICARE

## 2024-08-12 VITALS
HEIGHT: 69 IN | BODY MASS INDEX: 26.29 KG/M2 | OXYGEN SATURATION: 99 % | DIASTOLIC BLOOD PRESSURE: 72 MMHG | TEMPERATURE: 98 F | SYSTOLIC BLOOD PRESSURE: 114 MMHG | WEIGHT: 177.5 LBS | HEART RATE: 74 BPM

## 2024-08-12 DIAGNOSIS — J30.9 ALLERGIC SINUSITIS: Primary | ICD-10-CM

## 2024-08-12 PROCEDURE — 99214 OFFICE O/P EST MOD 30 MIN: CPT | Mod: PBBFAC,PO | Performed by: PHYSICIAN ASSISTANT

## 2024-08-12 PROCEDURE — 99999 PR PBB SHADOW E&M-EST. PATIENT-LVL IV: CPT | Mod: PBBFAC,,, | Performed by: PHYSICIAN ASSISTANT

## 2024-08-12 PROCEDURE — 99213 OFFICE O/P EST LOW 20 MIN: CPT | Mod: S$PBB,,, | Performed by: PHYSICIAN ASSISTANT

## 2024-08-12 RX ORDER — METHYLPREDNISOLONE 4 MG/1
TABLET ORAL
Qty: 1 EACH | Refills: 0 | Status: SHIPPED | OUTPATIENT
Start: 2024-08-12

## 2024-08-12 NOTE — PROGRESS NOTES
Subjective:       Patient ID: Dustin Owens is a 69 y.o. male.    Chief Complaint: Allergies    Pt is a 70 yo M presenting for persistent nasal congestion, headache and allergies. On 7.8.24 pt was seen at urgent care for cough, nasal congestion and headache and was prescribed doxycycline and guaifenesin. He stated that it seemed to help but then a few days later, symptoms started to return and gradually worsened. He is now having moderate facial pain, headaches, pain in his teeth and pressure in his ears. He is no longer coughing but is now experiencing eye burning, erythema, pruritus, morning drainage and blurry vision for the last 3 days. He still has some rhinorrhea but is also sneezing now. His mucus is clear. He was having some hoarseness but has no sore throat. He denies fever, or exposure to anyone sick. He has been in his new home for about 5 months and noticed that it is very [place]. He has tried using an air filter and OTC allergy meds that have not helped his symptoms. He recently traveled to [place] a few weeks ago and stated that he did not have any symptoms while there.       Review of patient's allergies indicates:   Allergen Reactions    Amoxicillin Diarrhea    Pollen, micronized Other (See Comments)     SNEEZING         Current Outpatient Medications:     albuterol (VENTOLIN HFA) 90 mcg/actuation inhaler, Inhale 2 puffs into the lungs every 6 (six) hours as needed for Wheezing. Rescue, Disp: 18 g, Rfl: 0    atorvastatin (LIPITOR) 10 MG tablet, Take 1 tablet by mouth once daily., Disp: , Rfl:     clobetasoL (TEMOVATE) 0.05 % cream, Aaa bid x 1-2 wks max, tk 1 wk off before repeating, Disp: 60 g, Rfl: 2    ipratropium (ATROVENT) 21 mcg (0.03 %) nasal spray, 2 sprays by Each Nostril route every 4 to 6 hours as needed for Rhinitis., Disp: 30 mL, Rfl: 6    levocetirizine (XYZAL) 5 MG tablet, Take 1 tablet (5 mg total) by mouth every evening., Disp: 30 tablet, Rfl: 3    methylPREDNISolone (MEDROL  DOSEPACK) 4 mg tablet, use as directed, Disp: 1 each, Rfl: 0    Current Facility-Administered Medications:     LIDOcaine HCL 10 mg/ml (1%) injection 3 mL, 3 mL, Other, Once, Vira Bautista,     Facility-Administered Medications Ordered in Other Visits:     electrolyte-S (ISOLYTE), , Intravenous, Continuous, Vicente Varner MD, Last Rate: 25 mL/hr at 05/01/24 0845, Rate Change at 05/01/24 0845    fentaNYL 50 mcg/mL injection 25 mcg, 25 mcg, Intravenous, Q5 Min PRN, Vicente Varner MD, 25 mcg at 05/01/24 0957    ondansetron injection 4 mg, 4 mg, Intravenous, Daily PRN, Vicente Varner MD    oxyCODONE immediate release tablet 5 mg, 5 mg, Oral, Q3H PRN, Vicente Varner MD, 5 mg at 05/01/24 0950    sodium chloride 0.9% flush 3 mL, 3 mL, Intravenous, PRN, Vicente Varner MD    Lab Results   Component Value Date    WBC 7.66 04/24/2024    HGB 15.0 04/24/2024    HCT 46.5 04/24/2024     04/24/2024    CHOL 182 06/10/2024    TRIG 117 06/10/2024    HDL 39 (L) 06/10/2024    ALT 40 06/17/2024    AST 26 06/17/2024     06/10/2024    K 4.4 06/10/2024     06/10/2024    CREATININE 1.3 06/10/2024    BUN 14 06/10/2024    CO2 22 (L) 06/10/2024    TSH 1.554 06/10/2024    PSA 0.47 06/10/2024    HGBA1C 5.7 (H) 06/10/2024       Review of Systems   Constitutional:  Negative for fever.   HENT:  Positive for congestion, postnasal drip, rhinorrhea, sinus pressure, sinus pain, sneezing and voice change. Negative for ear discharge, ear pain and sore throat.    Eyes:  Positive for pain, discharge, redness, itching and visual disturbance.   Respiratory:  Negative for cough and chest tightness.    Musculoskeletal:  Negative for neck pain.   Neurological:  Positive for headaches.       Objective:      Physical Exam  Constitutional:       Appearance: Normal appearance. He is normal weight.   HENT:      Head: Normocephalic and atraumatic.      Comments: Maxillary sinus tenderness to palpation     Right Ear: Ear  canal and external ear normal.      Left Ear: Ear canal and external ear normal.      Ears:      Comments: Decreased cone of light bilaterally  Eyes:      Extraocular Movements: Extraocular movements intact.      Conjunctiva/sclera:      Right eye: Right conjunctiva is injected. No exudate.     Left eye: Left conjunctiva is injected. No exudate.     Pupils: Pupils are equal, round, and reactive to light.   Cardiovascular:      Rate and Rhythm: Normal rate and regular rhythm.      Pulses: Normal pulses.      Heart sounds: Normal heart sounds.   Pulmonary:      Effort: Pulmonary effort is normal.      Breath sounds: Normal breath sounds.   Musculoskeletal:      Cervical back: Normal range of motion and neck supple. No tenderness.   Lymphadenopathy:      Cervical: No cervical adenopathy.   Skin:     General: Skin is warm and dry.   Neurological:      General: No focal deficit present.      Mental Status: He is alert and oriented to person, place, and time.         Assessment:       1. Allergic sinusitis        Plan:       Dustin was seen today for allergies.    Diagnoses and all orders for this visit:    Allergic sinusitis  Resume daily antihistamine and nasal spray  Other orders  -     methylPREDNISolone (MEDROL DOSEPACK) 4 mg tablet; use as directed

## 2024-09-03 ENCOUNTER — TELEPHONE (OUTPATIENT)
Dept: ALLERGY | Facility: CLINIC | Age: 69
End: 2024-09-03
Payer: MEDICARE

## 2024-09-17 NOTE — PROGRESS NOTES
"ALLERGY & IMMUNOLOGY CLINIC -  Established Patient     HISTORY OF PRESENT ILLNESS     Patient ID: Dustin Owens is a 69 y.o. male    CC: follow up visit    HPI: Dustin Owens is a 69 y.o. male presents for evaluation of:    Office Visit 09/18/2024  Allergic rhinitis: Sensitized to cockroach and geovanna grass. States that for the previous 3 months has been experiencing increased nasal congestion, puffy eyes, runny nose and sneezing. Completed course of antibiotics on 2 occasions for possible tooth infection. Atrovent nasal spray has relieved some symptoms. Denies further nasal spray usage. Symptoms have been waxing and waning for the previous 3 months, likely having symptoms >50% of the time. Symptoms will improve gradually throughout the day. Was prescribed xyzal at nighttime which helps with nighttime symptoms and sleep.     6/2023  Rhinitis: Endorses lifelong episodes of runny nose, nasal congestion, sneezing as well as itchy/watery eyes. Symptoms worsened around dust, dogs, and "pollen." Recently prescribed fluticasone but discontinued due to "irritation." Has not tried OTC antihistamines nor additional nasal sprays. Sinus surgery 20+ years ago with no relief of symptoms. Averages 2 sinus infections per year. Most bothersome symptom is a runny nose. Mostly a clear runny nose     Asthma as a child, no inhaler usage since 15 years of age     Amoxicillin: Causes diarrhea when he takes for prolonged period of time.      H/o Asthma: denies  H/o Eczema: denies  H/o Rhinitis: denies  Oral Allergy:  denies  Food Allergy: denies  Venom Allergy: denies  Latex Allergy: denies  Env/Occ: denies any environmental or occupational exposures     REVIEW OF SYSTEMS     CONST: no F/C/NS, no unintentional weight changes  Balance of review of systems negative except as mentioned above     MEDICAL HISTORY     MedHx: active problems reviewed  SurgHx:   Past Surgical History:   Procedure Laterality Date    COLONOSCOPY      " "COLONOSCOPY N/A 03/27/2023    Procedure: COLONOSCOPY;  Surgeon: Arjun Ramos MD;  Location: [facility];  Service: Endoscopy;  Laterality: N/A;    LIPOMA RESECTION Left 5/1/2024    Procedure: EXCISION, LIPOMA;  Surgeon: Philipp Chavez MD;  Location: [facility] OR;  Service: Orthopedics;  Laterality: Left;    SINUS SURGERY       Allergies: see below  Medications: MAR reviewed    No pertinent allergy changes in medical history since last visit     PHYSICAL EXAM     VS: Ht 5' 9" (1.753 m)   Wt 81.9 kg (180 lb 8.9 oz)   BMI 26.66 kg/m²   GENERAL: awake, alert, cooperative with exam  EYES: PERRL, EOMI, no conjunctival injection, no discharge, no infraorbital shiners  EARS: external auditory canals normal B/L, TM normal B/L  NOSE: NT 2+ and pink B/L, no stringing mucous, no polyps  ORAL: MMM, no ulcers, no thrush, no cobblestoning  LUNGS: CTAB, no w/r/c, no increased WOB  HEART: Normal Rate and regular rhythm, normal S1/S2, no m/g/r  EXTREMITIES: +2 distal pulses, no c/c/e  DERM: no rashes, no skin breaks  NEURO: normal gait, no facial asymmetry     LABORATORY/ALLERGY Evaluation     Sensitized to cockroach and russ grass     ASSESSMENT/PLAN     Dustin Owens is a 69 y.o. male with       1. Chronic allergic rhinitis    2. Rhinorrhea      Sensitized to cockroach and Russ grass with mild improvement with atrovent nasal spray. Recommend initiation of fluticasone/azelastine nasal spray 2 sprays each nostril daily. Does not wish to pursue allergen immunotherapy at this time so will additionally send to ENT as well.     Addendum: PA required for dymista. Will send in individual nasal sprays of flonase and azelastine to be used 2 sprays each nostril daily     Follow up: As Needed      Andre Wray MD    I spent a total of 30 minutes on the day of the visit. This includes face to face time and non-face to face time preparing to see the patient (eg, review of tests), obtaining and/or reviewing separately obtained " history, documenting clinical information in the electronic or other health record, independently interpreting results and communicating results to the patient/family/caregiver, or care coordinator.

## 2024-09-18 ENCOUNTER — OFFICE VISIT (OUTPATIENT)
Dept: ALLERGY | Facility: CLINIC | Age: 69
End: 2024-09-18
Payer: MEDICARE

## 2024-09-18 VITALS — HEIGHT: 69 IN | WEIGHT: 180.56 LBS | BODY MASS INDEX: 26.74 KG/M2

## 2024-09-18 DIAGNOSIS — J34.89 RHINORRHEA: ICD-10-CM

## 2024-09-18 DIAGNOSIS — J30.9 CHRONIC ALLERGIC RHINITIS: Primary | ICD-10-CM

## 2024-09-18 PROCEDURE — 99213 OFFICE O/P EST LOW 20 MIN: CPT | Mod: PBBFAC,PO | Performed by: STUDENT IN AN ORGANIZED HEALTH CARE EDUCATION/TRAINING PROGRAM

## 2024-09-18 PROCEDURE — 99999 PR PBB SHADOW E&M-EST. PATIENT-LVL III: CPT | Mod: PBBFAC,,, | Performed by: STUDENT IN AN ORGANIZED HEALTH CARE EDUCATION/TRAINING PROGRAM

## 2024-09-18 PROCEDURE — 99214 OFFICE O/P EST MOD 30 MIN: CPT | Mod: S$PBB,,, | Performed by: STUDENT IN AN ORGANIZED HEALTH CARE EDUCATION/TRAINING PROGRAM

## 2024-09-18 RX ORDER — AZELASTINE HYDROCHLORIDE, FLUTICASONE PROPIONATE 137; 50 UG/1; UG/1
2 SPRAY, METERED NASAL DAILY
Qty: 23 G | Refills: 5 | Status: SHIPPED | OUTPATIENT
Start: 2024-09-18 | End: 2025-09-18

## 2024-09-18 RX ORDER — AZELASTINE 1 MG/ML
2 SPRAY, METERED NASAL DAILY
Qty: 60 ML | Refills: 6 | Status: SHIPPED | OUTPATIENT
Start: 2024-09-18 | End: 2024-09-18

## 2024-09-18 RX ORDER — TIZANIDINE 2 MG/1
2 TABLET ORAL 3 TIMES DAILY
COMMUNITY
Start: 2024-09-08

## 2024-09-18 RX ORDER — FLUTICASONE PROPIONATE 50 MCG
2 SPRAY, SUSPENSION (ML) NASAL DAILY
Qty: 32 G | Refills: 6 | Status: SHIPPED | OUTPATIENT
Start: 2024-09-18 | End: 2024-09-18

## 2024-09-20 ENCOUNTER — OFFICE VISIT (OUTPATIENT)
Dept: OTOLARYNGOLOGY | Facility: CLINIC | Age: 69
End: 2024-09-20
Payer: MEDICARE

## 2024-09-20 VITALS
DIASTOLIC BLOOD PRESSURE: 81 MMHG | WEIGHT: 181.44 LBS | SYSTOLIC BLOOD PRESSURE: 129 MMHG | BODY MASS INDEX: 27.5 KG/M2 | HEIGHT: 68 IN | HEART RATE: 77 BPM

## 2024-09-20 DIAGNOSIS — J34.89 RHINORRHEA: ICD-10-CM

## 2024-09-20 DIAGNOSIS — J30.9 CHRONIC ALLERGIC RHINITIS: ICD-10-CM

## 2024-09-20 PROCEDURE — 99213 OFFICE O/P EST LOW 20 MIN: CPT | Mod: 25,S$PBB,, | Performed by: PHYSICIAN ASSISTANT

## 2024-09-20 PROCEDURE — 99999 PR PBB SHADOW E&M-EST. PATIENT-LVL IV: CPT | Mod: PBBFAC,,, | Performed by: PHYSICIAN ASSISTANT

## 2024-09-20 PROCEDURE — 31231 NASAL ENDOSCOPY DX: CPT | Mod: S$PBB,,, | Performed by: PHYSICIAN ASSISTANT

## 2024-09-20 PROCEDURE — 99214 OFFICE O/P EST MOD 30 MIN: CPT | Mod: PBBFAC,PO | Performed by: PHYSICIAN ASSISTANT

## 2024-09-20 PROCEDURE — 31231 NASAL ENDOSCOPY DX: CPT | Mod: PBBFAC,PO | Performed by: PHYSICIAN ASSISTANT

## 2024-09-20 RX ORDER — FLUTICASONE PROPIONATE 50 MCG
SPRAY, SUSPENSION (ML) NASAL
COMMUNITY
Start: 2024-09-18

## 2024-09-20 RX ORDER — AZELASTINE 1 MG/ML
2 SPRAY, METERED NASAL
COMMUNITY
Start: 2024-09-18

## 2024-09-20 NOTE — PROGRESS NOTES
[facility]    Subjective:      Patient: Dustin Owens Patient PCP: Jimbo Howe MD         :  1955     Sex:  male      MRN:  [id number]          Date of Visit:  2024      Chief Complaint: Rhinitis    Patient ID: Dustin Owens is a 69 y.o. male who presents to office for evaluation of AR. Pt had prior FESS in the . He is unsure of the exact surgery. Pt was recently told by endodontist that he had right maxillary odontogenic sinusitis and was treated with a 14 course of clindamycin. Pt states that he did have imaging completed at dental providers office. Pt states that he was having right maxillary sinus pressure/pain that has resolved. He states that he was having issues with clear runny nose. He states that he has had significant improvement in symptoms with use of dymista 1 spray to each nostril BID. He had immunocap aeroallergen testing 2023: Cockroach class I; Russ grass class 0/1. This last issue with sinusitis is his first acute sinus infection in the past 12 months. He has h/o childhood asthma. He denies allergies to NSAIDs/ASA. He denies current issues with fever/chills, paranasal sinus pressure/pain, purulent nasal discharge or malodorous smell change.    Past Medical History  He has a past medical history of Asthma, Colon polyp, and Personal history of colonic polyps.    Family History  His family history includes Cancer in his mother; Diabetes in his father.    Past Surgical History:   Procedure Laterality Date    COLONOSCOPY      COLONOSCOPY N/A 2023    Procedure: COLONOSCOPY;  Surgeon: Arjun Ramos MD;  Location: [facility];  Service: Endoscopy;  Laterality: N/A;    LIPOMA RESECTION Left 2024    Procedure: EXCISION, LIPOMA;  Surgeon: Philipp Chavez MD;  Location: [facility];  Service: Orthopedics;  Laterality: Left;    SINUS SURGERY       Social History     Tobacco Use    Smoking status: Former     Types: Cigarettes     Passive exposure: Past     "Smokeless tobacco: Never   Substance and Sexual Activity    Alcohol use: Not Currently    Drug use: Never    Sexual activity: Yes     Partners: Female     Medications  He has a current medication list which includes the following prescription(s): albuterol, atorvastatin, azelastine, clobetasol, fluticasone propionate, ipratropium, levocetirizine, tizanidine, azelastine-fluticasone, and methylprednisolone, and the following Facility-Administered Medications: electrolyte-s (ph 7.4), fentanyl, lidocaine hcl 10 mg/ml (1%), ondansetron, oxycodone, and sodium chloride 0.9%.    Review of patient's allergies indicates:   Allergen Reactions    Amoxicillin Diarrhea    Pollen, micronized Other (See Comments)     SNEEZING     All medications, allergies, and past history have been reviewed.    Objective:      Vitals:      8/12/2024     1:19 PM 9/18/2024     1:12 PM 9/20/2024     1:25 PM   Vitals - 1 value per visit   SYSTOLIC 114  129   DIASTOLIC 72  81   Pulse 74  77   Temp 97.9 °F (36.6 °C)     SPO2 99 %     Weight (lb) 177.47 180.56 181.44   Weight (kg) 80.5 81.9 82.3   Height 5' 9" (1.753 m) 5' 9" (1.753 m) 5' 8" (1.727 m)   BMI (Calculated) 26.2 26.7 27.6   Pain Score Six Zero Seven       Body surface area is 1.99 meters squared.  Physical Exam  Constitutional:       General: He is not in acute distress.     Appearance: Normal appearance. He is not ill-appearing.   HENT:      Head: Normocephalic and atraumatic.      Right Ear: Tympanic membrane, ear canal and external ear normal.      Left Ear: Tympanic membrane, ear canal and external ear normal.      Nose: Nose normal.      Mouth/Throat:      Lips: Pink. No lesions.      Mouth: Mucous membranes are moist. No oral lesions.      Tongue: No lesions.      Palate: No lesions.      Pharynx: Oropharynx is clear. Uvula midline. No pharyngeal swelling, oropharyngeal exudate, posterior oropharyngeal erythema or uvula swelling.   Eyes:      General:         Right eye: No discharge.  "        Left eye: No discharge.      Extraocular Movements: Extraocular movements intact.      Conjunctiva/sclera: Conjunctivae normal.   Pulmonary:      Effort: Pulmonary effort is normal.   Neurological:      General: No focal deficit present.      Mental Status: He is alert and oriented to person, place, and time. Mental status is at baseline.   Psychiatric:         Mood and Affect: Mood normal.         Behavior: Behavior normal.         Thought Content: Thought content normal.         Judgment: Judgment normal.       Nasal/sinus endoscopy     Date/Time: 9/20/2024 1:30 PM     Performed by: David Conklin PA-C  Authorized by: David Conklin PA-C    Consent Done?:  Yes (Verbal)  Anesthesia:     Local anesthetic:  Lidocaine 4% and Ja-Synephrine 1/2%    Location: bilateral nares.    Type of Endoscope:  Flexible (disposable ambu scope)    Patient tolerance:  Patient tolerated the procedure well with no immediate complications  Nose:     Procedure Performed:  Nasal Endoscopy  External:      No external nasal deformity  Intranasal:      Mucosa no polyps     Mucosa ulcers not present     No mucosa lesions present     Turbinates not enlarged     No septum gross deformity  Nasopharynx:      No mucosa lesions     Posterior choanae patent     Eustachian tube patent       Labs:  WBC   Date Value Ref Range Status   04/24/2024 7.66 3.90 - 12.70 K/uL Final     Eosinophil %   Date Value Ref Range Status   04/24/2024 1.4 0.0 - 8.0 % Final     Eos #   Date Value Ref Range Status   04/24/2024 0.1 0.0 - 0.5 K/uL Final     Platelets   Date Value Ref Range Status   04/24/2024 191 150 - 450 K/uL Final     Glucose   Date Value Ref Range Status   06/10/2024 105 70 - 110 mg/dL Final     Total IgE   Date Value Ref Range Status   06/08/2023 60 0 - 100 IU/mL Final     All lab results, imaging results, and data have been reviewed.    Assessment:        ICD-10-CM ICD-9-CM   1. Chronic allergic rhinitis  J30.9 477.9   2.  Rhinorrhea  J34.89 478.19            Plan:     Nasal endoscopy unremarkable today in office. 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 He also describes what sounds to be septoplasty with SMRT. He has had significant improvement in sinonasal symptoms with use of dymista 1 spray to each nostril BID. Continue use of dymista. Records request for outside CT as pt states he was told by endodontist that he had right maxillary odontogenic sinusitis. Will review CT and decide on follow up/further scanning dependant on CT findings. Pt states he will bring us a copy of CT.

## 2024-09-23 NOTE — PROCEDURES
Nasal/sinus endoscopy    Date/Time: 9/20/2024 1:30 PM    Performed by: David Conklin PA-C  Authorized by: David Conklin PA-C    Consent Done?:  Yes (Verbal)  Anesthesia:     Local anesthetic:  Lidocaine 4% and Ja-Synephrine 1/2%    Location: bilateral nares.    Type of Endoscope:  Flexible (disposable ambu scope)    Patient tolerance:  Patient tolerated the procedure well with no immediate complications  Nose:     Procedure Performed:  Nasal Endoscopy  External:      No external nasal deformity  Intranasal:      Mucosa no polyps     Mucosa ulcers not present     No mucosa lesions present     Turbinates not enlarged     No septum gross deformity  Nasopharynx:      No mucosa lesions     Posterior choanae patent     Eustachian tube patent

## 2024-10-01 ENCOUNTER — E-VISIT (OUTPATIENT)
Dept: FAMILY MEDICINE | Facility: CLINIC | Age: 69
End: 2024-10-01
Payer: MEDICARE

## 2024-10-01 DIAGNOSIS — N52.9 ERECTILE DYSFUNCTION, UNSPECIFIED ERECTILE DYSFUNCTION TYPE: Primary | ICD-10-CM

## 2024-10-02 ENCOUNTER — E-CONSULT (OUTPATIENT)
Dept: PHARMACY | Facility: CLINIC | Age: 69
End: 2024-10-02
Payer: MEDICARE

## 2024-10-02 DIAGNOSIS — N52.9 ERECTILE DYSFUNCTION, UNSPECIFIED ERECTILE DYSFUNCTION TYPE: Primary | ICD-10-CM

## 2024-10-02 NOTE — CONSULTS
[facility]  Response for E-Consult     Patient Name: Dustin Owens  MRN: [id number]  Primary Care Provider: Jimbo Howe MD   Requesting Provider: Jimbo Howe MD  E-Consult to Pharmacy  Consult performed by: Radha Todd PharmD  Consult ordered by: Jimbo Howe MD  Reason for consult: Cost savings/formulary question          Unfortunately, this eConsult has been declined due to: Other    Other:  This eConsult submission cannot be completed at this time due to Insurance will not cover for any ED medications. Patient will need to acquire discount card (ex. good rx).    Patient's best price will be at [facility]: $17.31 for 30 tablets.  BIN [id number]  [facility]  [employer] [id number]  ID [id number]      Thank you for this eConsult referral.     Radha Todd PharmD  [facility]

## 2024-10-02 NOTE — PROGRESS NOTES
Patient ID: Dustin Owens is a 69 y.o. male.    Chief Complaint: General Illness (Entered automatically based on patient selection in [employer].)          274}  The patient initiated a request through [employer] on 10/1/2024 for evaluation and management with a chief complaint of General Illness (Entered automatically based on patient selection in [employer].)     I evaluated the questionnaire submission on 10/03/2024 .    Total Time (in minutes): 12     [facility] Peq Evisit [facility]    10/2/2024  2:06 PM CDT - Filed by Patient   What do you need help with? Erectile Dysfunction   Do you agree to participate in an E-Visit? Yes   If you have any of the symptoms below, please do not complete an E-Visit. Instead, go to your local emergency room: I acknowledge   I would like to address: Medication for Sexual Dysfunction   Have you ever had an erection? Yes   Have you ever been able to keep an erection through to ejaculation? Yes   In the past month, have you woken up with an erection? No   Are you able to keep an erection for some sexual activities, but not others? No   Do you have any of the following symptoms? Unable to keep erection;  Unable to get erection   Before your sexual symptoms started, did you have any of the following? Increased stress;  New medication;  Relationship started or ended   Do you want to address a new or existing medication? I would like to start a new medication that I do not already take   What is the name of the medication that you would like to start? Viagra   Have you taken a similar medication in the past? Yes   What was the name of the similar medication? Sildenafil 100 mg   Why are you no longer on that medication? Cost/insurance issues   Which best describes your cost or insurance problem? Insurance won't pay    What medical condition is the  medication intended to treat? ED   Provide any additional information you feel is important.    Please attach any relevant images or files     Are you able to take your vital signs? Yes   Systolic Blood Pressure: 129   Diastolic Blood Pressure: 81   Weight: 177   Height: 72   Pulse: 89   Temperature: 98.7   Respiration rate:    Pulse Oxygen:           Active Problem List with Overview Notes    Diagnosis Date Noted    Erectile dysfunction 06/10/2024    Pain in left shoulder 04/04/2024    Impaired range of motion of left shoulder 04/04/2024    Impaired functional mobility and activity tolerance 04/04/2024    Posture abnormality 03/25/2022    Weakness of both upper extremities 03/25/2022    Decreased ROM of intervertebral discs of cervical spine 03/25/2022    Cervical disc disorder with radiculopathy 03/24/2022    Anxiety 02/03/2022    Moderate episode of recurrent major depressive disorder 01/12/2022    Insomnia 01/12/2022    Hyperlipidemia 01/12/2022    Non-seasonal allergic rhinitis due to pollen 01/12/2022      Recent Labs Obtained:  Lab Results   Component Value Date    WBC 7.66 04/24/2024    HGB 15.0 04/24/2024    HCT 46.5 04/24/2024    MCV 88 04/24/2024     04/24/2024     06/10/2024    K 4.4 06/10/2024     06/10/2024    CREATININE 1.3 06/10/2024    EGFRNORACEVR >60.0 06/10/2024    HGBA1C 5.7 (H) 06/10/2024    TSH 1.554 06/10/2024      Review of patient's allergies indicates:   Allergen Reactions    Amoxicillin Diarrhea    Pollen, micronized Other (See Comments)     SNEEZING       Encounter Diagnosis   Name Primary?    Erectile dysfunction, unspecified erectile dysfunction type Yes        Orders Placed This Encounter   Procedures    E-Consult to Pharmacy     Order Specific Question:   Consent has been obtained from patient, and patient is aware of applicable cost? Pending specialist evaluation, I will follow up with the patient.     Answer:   Yes     Order Specific Question:   Reason for E-Consult?     Answer:   Cost savings/formulary medication question     Order Specific Question:   What is your clinical question?     Answer:    Pt desires viagra 100 mg for ED but too expensive is there a different dose we can use that will be cheapest option?     Order Specific Question:   Total time spent preparing for the referral and/or communicating with the consulting physician:     Answer:   <16 minutes: no billing code should be submitted      Medications Ordered This Encounter   Medications    sildenafiL (VIAGRA) 100 MG tablet     Sig: Take 1 tablet (100 mg total) by mouth daily as needed for Erectile Dysfunction.     Dispense:  10 tablet     Refill:  2        E-Visit Time Tracking:    Day 1 Time (in minutes): 12    Total Time (in minutes): 12      274}

## 2024-10-03 RX ORDER — SILDENAFIL 100 MG/1
100 TABLET, FILM COATED ORAL DAILY PRN
Qty: 10 TABLET | Refills: 2 | Status: SHIPPED | OUTPATIENT
Start: 2024-10-03 | End: 2025-10-03

## 2024-10-25 ENCOUNTER — PATIENT MESSAGE (OUTPATIENT)
Dept: RESEARCH | Facility: HOSPITAL | Age: 69
End: 2024-10-25
Payer: MEDICARE

## 2024-12-01 NOTE — TELEPHONE ENCOUNTER
No care due was identified.  [facility] Embedded Care Due Messages. Reference number: [id number].   12/01/2024 1:15:14 AM CST

## 2024-12-02 RX ORDER — SILDENAFIL 100 MG/1
100 TABLET, FILM COATED ORAL DAILY PRN
Qty: 10 TABLET | Refills: 2 | Status: SHIPPED | OUTPATIENT
Start: 2024-12-02 | End: 2025-12-02

## 2025-03-14 ENCOUNTER — HOSPITAL ENCOUNTER (EMERGENCY)
Facility: HOSPITAL | Age: 70
Discharge: HOME | End: 2025-03-14
Attending: EMERGENCY MEDICINE
Payer: MEDICARE

## 2025-03-14 VITALS
DIASTOLIC BLOOD PRESSURE: 93 MMHG | HEART RATE: 67 BPM | BODY MASS INDEX: 27.78 KG/M2 | HEIGHT: 67 IN | RESPIRATION RATE: 18 BRPM | SYSTOLIC BLOOD PRESSURE: 150 MMHG | WEIGHT: 177 LBS | TEMPERATURE: 98 F | OXYGEN SATURATION: 100 %

## 2025-03-14 DIAGNOSIS — B34.9 VIRAL SYNDROME: Primary | ICD-10-CM

## 2025-03-14 LAB
HCV AB SERPL QL IA: NEGATIVE
HIV 1+2 AB+HIV1 P24 AG SERPL QL IA: NEGATIVE
INFLUENZA A, MOLECULAR: NEGATIVE
INFLUENZA B, MOLECULAR: NEGATIVE
SARS-COV-2 RDRP RESP QL NAA+PROBE: NEGATIVE
SPECIMEN SOURCE: NORMAL

## 2025-03-14 PROCEDURE — 86803 HEPATITIS C AB TEST: CPT | Performed by: EMERGENCY MEDICINE

## 2025-03-14 PROCEDURE — 87635 SARS-COV-2 COVID-19 AMP PRB: CPT | Performed by: EMERGENCY MEDICINE

## 2025-03-14 PROCEDURE — 99283 EMERGENCY DEPT VISIT LOW MDM: CPT

## 2025-03-14 PROCEDURE — 36415 COLL VENOUS BLD VENIPUNCTURE: CPT | Performed by: EMERGENCY MEDICINE

## 2025-03-14 PROCEDURE — 87389 HIV-1 AG W/HIV-1&-2 AB AG IA: CPT | Performed by: EMERGENCY MEDICINE

## 2025-03-14 PROCEDURE — 87502 INFLUENZA DNA AMP PROBE: CPT | Performed by: EMERGENCY MEDICINE

## 2025-03-14 NOTE — ED PROVIDER NOTES
Encounter Date: 3/14/2025       History     Chief Complaint   Patient presents with    Headache     Got really bad last night    Fever     A little bit here and there for about 3 or 4 days    Neck Pain     Patient presents emergency department with reported upper respiratory symptoms been ongoing for 2 weeks with congestion rhinorrhea occasional cough diffuse myalgias including left arm pain headache and malaise states he has been running low-grade fever states his temperature is typically 97.5 and today he was 98.6 he denies any nausea vomiting diarrhea no dysuria urgency or frequency no neck stiffness no abdominal pain no chest pain he denies any sick contacts at home        Review of patient's allergies indicates:   Allergen Reactions    Amoxicillin Diarrhea    Pollen, micronized Other (See Comments)     SNEEZING     Past Medical History:   Diagnosis Date    Asthma     Colon polyp     Personal history of colonic polyps 07/11/2018     Past Surgical History:   Procedure Laterality Date    COLONOSCOPY      COLONOSCOPY N/A 03/27/2023    Procedure: COLONOSCOPY;  Surgeon: Arjun Ramos MD;  Location: [facility];  Service: Endoscopy;  Laterality: N/A;    LIPOMA RESECTION Left 5/1/2024    Procedure: EXCISION, LIPOMA;  Surgeon: Philipp Chavez MD;  Location: [facility];  Service: Orthopedics;  Laterality: Left;    SINUS SURGERY       Family History   Problem Relation Name Age of Onset    Cancer Mother      Diabetes Father      Colon cancer Neg Hx      Colon polyps Neg Hx      Crohn's disease Neg Hx      Ulcerative colitis Neg Hx      Esophageal cancer Neg Hx      Stomach cancer Neg Hx       Social History[1]  Review of Systems   Constitutional:  Positive for fatigue. Negative for appetite change, chills and fever.   HENT:  Positive for congestion.    Respiratory:  Positive for cough. Negative for shortness of breath.    Gastrointestinal:  Negative for abdominal pain, diarrhea, nausea and vomiting.   Genitourinary:   Negative for dysuria.   Musculoskeletal:  Positive for myalgias.   Skin:  Negative for rash.   Neurological:  Positive for headaches.       Physical Exam     Initial Vitals [03/14/25 0305]   BP Pulse Resp Temp SpO2   139/65 71 16 97.8 °F (36.6 °C) 100 %      MAP       --         Physical Exam    Constitutional: He appears well-developed and well-nourished. No distress.   HENT:   Head: Normocephalic and atraumatic.   Right Ear: External ear normal.   Left Ear: External ear normal. Mouth/Throat: Oropharynx is clear and moist.   Eyes: Pupils are equal, round, and reactive to light.   Neck: Neck supple.   Normal range of motion.  Cardiovascular:  Normal rate, regular rhythm, S1 normal, S2 normal, normal heart sounds and intact distal pulses.           Pulmonary/Chest: Breath sounds normal.   Abdominal: Abdomen is soft. Bowel sounds are normal. There is no abdominal tenderness.   Musculoskeletal:         General: Normal range of motion.      Cervical back: Normal range of motion and neck supple.     Lymphadenopathy:     He has no cervical adenopathy.   Neurological: He is alert and oriented to person, place, and time. GCS score is 15. GCS eye subscore is 4. GCS verbal subscore is 5. GCS motor subscore is 6.   Skin: Skin is warm and dry. No rash noted.   Psychiatric: He has a normal mood and affect. His behavior is normal.         ED Course   Procedures  Labs Reviewed   INFLUENZA A & B BY MOLECULAR       Result Value    Influenza A, Molecular Negative      Influenza B, Molecular Negative      Flu A & B Source NP     HEPATITIS C ANTIBODY    Hepatitis C Ab Negative      Narrative:     Release to patient->Immediate   HIV 1 / 2 ANTIBODY    HIV 1/2 Ag/Ab Negative      Narrative:     Release to patient->Immediate   SARS-COV-2 RNA AMPLIFICATION, QUAL    SARS-CoV-2 RNA, Amplification, Qual Negative            Imaging Results    None          Medications - No data to display  Medical Decision Making  COVID and influenza are  negative will treat as viral syndrome outpatient follow up primary care provider return to ER for any worsened symptoms or new symptoms                                      Clinical Impression:  Final diagnoses:  [B34.9] Viral syndrome (Primary)          ED Disposition Condition    Discharge Stable          ED Prescriptions    None       Follow-up Information       Follow up With Specialties Details Why Contact Info    Jimbo Howe MD Family Medicine In 1 day for re-examination of your symptoms [address]  [place] [address]  [phone]                   [1]   Social History  Tobacco Use    Smoking status: Former     Types: Cigarettes     Passive exposure: Past    Smokeless tobacco: Never   Substance Use Topics    Alcohol use: Not Currently    Drug use: Never        Benjamin Rodriguez MD  03/14/25 0612

## 2025-03-18 ENCOUNTER — OFFICE VISIT (OUTPATIENT)
Dept: ORTHOPEDICS | Facility: CLINIC | Age: 70
End: 2025-03-18
Payer: MEDICARE

## 2025-03-18 VITALS — HEIGHT: 67 IN | WEIGHT: 177 LBS | BODY MASS INDEX: 27.78 KG/M2

## 2025-03-18 DIAGNOSIS — M67.912 DISORDER OF ROTATOR CUFF, LEFT: Primary | ICD-10-CM

## 2025-03-18 DIAGNOSIS — M25.612 IMPAIRED RANGE OF MOTION OF LEFT SHOULDER: ICD-10-CM

## 2025-03-18 DIAGNOSIS — M25.512 LEFT SHOULDER PAIN, UNSPECIFIED CHRONICITY: ICD-10-CM

## 2025-03-18 PROCEDURE — 99213 OFFICE O/P EST LOW 20 MIN: CPT | Mod: PBBFAC,PO | Performed by: FAMILY MEDICINE

## 2025-03-18 PROCEDURE — 99999 PR PBB SHADOW E&M-EST. PATIENT-LVL III: CPT | Mod: PBBFAC,,, | Performed by: FAMILY MEDICINE

## 2025-03-18 PROCEDURE — 20610 DRAIN/INJ JOINT/BURSA W/O US: CPT | Mod: PBBFAC,PO,LT | Performed by: FAMILY MEDICINE

## 2025-03-18 PROCEDURE — 99214 OFFICE O/P EST MOD 30 MIN: CPT | Mod: 25,S$PBB,, | Performed by: FAMILY MEDICINE

## 2025-03-18 PROCEDURE — 99999PBSHW PR PBB SHADOW TECHNICAL ONLY FILED TO HB: Mod: JZ,PBBFAC,,

## 2025-03-18 RX ORDER — METHYLPREDNISOLONE ACETATE 80 MG/ML
80 INJECTION, SUSPENSION INTRA-ARTICULAR; INTRALESIONAL; INTRAMUSCULAR; SOFT TISSUE
Status: DISCONTINUED | OUTPATIENT
Start: 2025-03-18 | End: 2025-03-18 | Stop reason: HOSPADM

## 2025-03-18 RX ADMIN — METHYLPREDNISOLONE ACETATE 80 MG: 80 INJECTION, SUSPENSION INTRA-ARTICULAR; INTRALESIONAL; INTRAMUSCULAR; SOFT TISSUE at 02:03

## 2025-03-18 NOTE — PROGRESS NOTES
Subjective     Patient ID: Dustin Owens is a 69 y.o. male.    Chief Complaint: Pain of the Left Shoulder (C/o L) shoulder pain for past month, w/worsening of pain in the past wk.  Pain level is 4/10 at rest; 8/10 when raising arm above head.  States that he's been seen by ortho for same problem before (Dr. Chavez).  States he heard some crunching in shoulder last week when moving it around.  No recent fall/injury.  )    Known to me from previous issues here today with complaints of left-sided shoulder pain.  This has been ongoing for about a month.  He had a similar issue last year.  Was seen by Dr. Chavez and was treated with a cortisone injection as well as physical therapy.  Does note getting relief from both of these although he believes the physical therapy was less helpful.  Can not recall any recent injury to his shoulder.  Notes it has gotten worse within the last week.  It is made worse with any movement of the arm particularly lifting overhead.  He feels some crepitus when moving the shoulder around and it does sometimes keep him from sleeping.    Pain  Pertinent negatives include no chest pain, chills, congestion, coughing, headaches, rash or sore throat.       Review of Systems   Constitutional: Negative for chills and decreased appetite.   HENT:  Negative for congestion and sore throat.    Eyes:  Negative for blurred vision.   Cardiovascular:  Negative for chest pain, dyspnea on exertion and palpitations.   Respiratory:  Negative for cough and shortness of breath.    Skin:  Negative for rash.   Neurological:  Negative for difficulty with concentration, disturbances in coordination and headaches.   Psychiatric/Behavioral:  Negative for altered mental status, depression, hallucinations, memory loss and suicidal ideas.           Objective     General    Nursing note and vitals reviewed.  Constitutional: He is oriented to person, place, and time. He appears well-developed and well-nourished.   HENT:    Nose: Nose normal.   Eyes: EOM are normal. Pupils are equal, round, and reactive to light.   Neck: Neck supple.   Cardiovascular:  Normal rate.            Pulmonary/Chest: Effort normal.   Abdominal: Soft.   Neurological: He is alert and oriented to person, place, and time. He has normal reflexes.   Psychiatric: He has a normal mood and affect. His behavior is normal. Judgment and thought content normal.         Right Shoulder Exam   Right shoulder exam is normal.    Inspection/Observation   Swelling: absent  Bruising: absent  Scars: absent  Deformity: absent  Scapular Winging: absent  Scapular Dyskinesia: negative    Range of Motion   Active abduction:  normal   Passive abduction:  normal   Extension:  normal   Forward Flexion:  normal   Forward Elevation: normal  Adduction: normal    Tests & Signs   Jones test: negative  Impingement: negative  Active Compression Test (Montour's Sign): negative  Speed's Test: negative  Anterior Drawer Test: 0   Posterior Drawer Test: 0    Other   Sensation: normal    Left Shoulder Exam     Inspection/Observation   Swelling: absent  Bruising: absent  Scars: absent  Deformity: absent  Scapular Winging: absent  Scapular Dyskinesia: negative    Tenderness   The patient is tender to palpation of the acromioclavicular joint and supraspinatus.    Range of Motion   Active abduction:  150   Forward Flexion:  150     Tests & Signs   Jones test: positive  Impingement: positive  Active Compression test (Montour's Sign): negative  Speed's Test: negative  Anterior Drawer Test: 0  Posterior Drawer Test: 0    Other   Sensation: normal       Muscle Strength   Right Upper Extremity   Shoulder Abduction: 5/5   Shoulder Internal Rotation: 5/5   Shoulder External Rotation: 5/5   Supraspinatus: 5/5   Subscapularis: 5/5   Biceps: 5/5   Left Upper Extremity  Shoulder Abduction: 5/5   Shoulder Internal Rotation: 5/5   Shoulder External Rotation: 5/5   Supraspinatus: 5/5   Subscapularis: 5/5   Biceps:  5/5     Vascular Exam     Right Pulses      Radial:                    2+      Left Pulses      Radial:                    2+      Physical Exam  Vitals and nursing note reviewed.   Constitutional:       Appearance: He is well-developed and well-nourished.   HENT:      Nose: Nose normal.   Eyes:      Extraocular Movements: EOM normal.      Pupils: Pupils are equal, round, and reactive to light.   Cardiovascular:      Rate and Rhythm: Normal rate.      Pulses:           Radial pulses are 2+ on the right side and 2+ on the left side.   Pulmonary:      Effort: Pulmonary effort is normal.   Abdominal:      Palpations: Abdomen is soft.   Musculoskeletal:      Right shoulder: No swelling or deformity.      Left shoulder: No swelling or deformity.      Cervical back: Normal range of motion and neck supple.   Neurological:      Mental Status: He is alert and oriented to person, place, and time.      Deep Tendon Reflexes: Reflexes are normal and symmetric.   Psychiatric:         Mood and Affect: Mood and affect normal.         Behavior: Behavior normal.         Thought Content: Thought content normal.         Judgment: Judgment normal.           Assessment and Plan     Encounter Diagnoses   Name Primary?    Left shoulder pain, unspecified chronicity     Impaired range of motion of left shoulder     Disorder of rotator cuff, left Yes         Edjaswinder was seen today for pain.    Diagnoses and all orders for this visit:    Disorder of rotator cuff, left    Left shoulder pain, unspecified chronicity    Impaired range of motion of left shoulder      He has an MRI from a year ago that show some partial tearing in the supraspinatus and infraspinatus.  We discussed Tenex as a possible treatment option for this.  He is interested in this but would like a another injection today for better short term relief.  A gave him some information on Tenex and injected his left shoulder today.  Recommend pulling up here as needed should pain  returned.    Large Joint Aspiration/Injection: L subacromial bursa    Date/Time: 3/18/2025 2:00 PM    Performed by: Alexys Starkey MD  Authorized by: Alexys Starkey MD    Consent Done?:  Yes (Verbal)  Indications:  Arthritis and pain  Site marked: the procedure site was marked    Timeout: prior to procedure the correct patient, procedure, and site was verified    Prep: patient was prepped and draped in usual sterile fashion      Local anesthesia used?: Yes    Local anesthetic:  Lidocaine 1% without epinephrine and topical anesthetic  Anesthetic total (ml):  2      Details:  Needle Size:  22 G  Ultrasonic Guidance for needle placement?: No    Approach:  Posterior  Location:  Shoulder  Site:  L subacromial bursa  Medications:  80 mg methylPREDNISolone acetate 80 mg/mL  Patient tolerance:  Patient tolerated the procedure well with no immediate complications

## 2025-03-24 DIAGNOSIS — Z00.00 ENCOUNTER FOR MEDICARE ANNUAL WELLNESS EXAM: ICD-10-CM

## 2025-06-11 ENCOUNTER — LAB VISIT (OUTPATIENT)
Dept: LAB | Facility: HOSPITAL | Age: 70
End: 2025-06-11
Attending: STUDENT IN AN ORGANIZED HEALTH CARE EDUCATION/TRAINING PROGRAM
Payer: MEDICARE

## 2025-06-11 ENCOUNTER — OFFICE VISIT (OUTPATIENT)
Dept: FAMILY MEDICINE | Facility: CLINIC | Age: 70
End: 2025-06-11
Payer: MEDICARE

## 2025-06-11 VITALS
DIASTOLIC BLOOD PRESSURE: 72 MMHG | RESPIRATION RATE: 16 BRPM | TEMPERATURE: 98 F | HEIGHT: 67 IN | SYSTOLIC BLOOD PRESSURE: 122 MMHG | BODY MASS INDEX: 28.37 KG/M2 | OXYGEN SATURATION: 98 % | HEART RATE: 76 BPM | WEIGHT: 180.75 LBS

## 2025-06-11 DIAGNOSIS — Z12.5 ENCOUNTER FOR PROSTATE CANCER SCREENING: ICD-10-CM

## 2025-06-11 DIAGNOSIS — R53.83 FATIGUE, UNSPECIFIED TYPE: ICD-10-CM

## 2025-06-11 DIAGNOSIS — L98.9 SKIN LESION: ICD-10-CM

## 2025-06-11 DIAGNOSIS — M50.10 CERVICAL DISC DISORDER WITH RADICULOPATHY: ICD-10-CM

## 2025-06-11 DIAGNOSIS — E78.2 MIXED HYPERLIPIDEMIA: ICD-10-CM

## 2025-06-11 DIAGNOSIS — F33.1 MODERATE EPISODE OF RECURRENT MAJOR DEPRESSIVE DISORDER: ICD-10-CM

## 2025-06-11 DIAGNOSIS — M54.2 NECK PAIN: ICD-10-CM

## 2025-06-11 DIAGNOSIS — R73.03 PREDIABETES: ICD-10-CM

## 2025-06-11 DIAGNOSIS — R79.9 ABNORMAL FINDING OF BLOOD CHEMISTRY, UNSPECIFIED: ICD-10-CM

## 2025-06-11 DIAGNOSIS — Z00.00 HEALTHCARE MAINTENANCE: Primary | ICD-10-CM

## 2025-06-11 LAB
ABSOLUTE EOSINOPHIL (OHS): 0.28 K/UL
ABSOLUTE MONOCYTE (OHS): 0.75 K/UL (ref 0.3–1)
ABSOLUTE NEUTROPHIL COUNT (OHS): 2.83 K/UL (ref 1.8–7.7)
ALBUMIN SERPL BCP-MCNC: 3.9 G/DL (ref 3.5–5.2)
ALP SERPL-CCNC: 67 UNIT/L (ref 40–150)
ALT SERPL W/O P-5'-P-CCNC: 41 UNIT/L (ref 10–44)
ANION GAP (OHS): 7 MMOL/L (ref 8–16)
AST SERPL-CCNC: 26 UNIT/L (ref 11–45)
BASOPHILS # BLD AUTO: 0.04 K/UL
BASOPHILS NFR BLD AUTO: 0.6 %
BILIRUB SERPL-MCNC: 0.7 MG/DL (ref 0.1–1)
BUN SERPL-MCNC: 10 MG/DL (ref 8–23)
CALCIUM SERPL-MCNC: 9 MG/DL (ref 8.7–10.5)
CHLORIDE SERPL-SCNC: 106 MMOL/L (ref 95–110)
CHOLEST SERPL-MCNC: 153 MG/DL (ref 120–199)
CHOLEST/HDLC SERPL: 3.7 {RATIO} (ref 2–5)
CO2 SERPL-SCNC: 27 MMOL/L (ref 23–29)
CREAT SERPL-MCNC: 1.2 MG/DL (ref 0.5–1.4)
EAG (OHS): 123 MG/DL (ref 68–131)
ERYTHROCYTE [DISTWIDTH] IN BLOOD BY AUTOMATED COUNT: 13.4 % (ref 11.5–14.5)
GFR SERPLBLD CREATININE-BSD FMLA CKD-EPI: >60 ML/MIN/1.73/M2
GLUCOSE SERPL-MCNC: 99 MG/DL (ref 70–110)
HBA1C MFR BLD: 5.9 % (ref 4–5.6)
HCT VFR BLD AUTO: 47.5 % (ref 40–54)
HDLC SERPL-MCNC: 41 MG/DL (ref 40–75)
HDLC SERPL: 26.8 % (ref 20–50)
HGB BLD-MCNC: 14.5 GM/DL (ref 14–18)
IMM GRANULOCYTES # BLD AUTO: 0.02 K/UL (ref 0–0.04)
IMM GRANULOCYTES NFR BLD AUTO: 0.3 % (ref 0–0.5)
LDLC SERPL CALC-MCNC: 78 MG/DL (ref 63–159)
LYMPHOCYTES # BLD AUTO: 2.52 K/UL (ref 1–4.8)
MCH RBC QN AUTO: 27.4 PG (ref 27–31)
MCHC RBC AUTO-ENTMCNC: 30.5 G/DL (ref 32–36)
MCV RBC AUTO: 90 FL (ref 82–98)
NONHDLC SERPL-MCNC: 112 MG/DL
NUCLEATED RBC (/100WBC) (OHS): 0 /100 WBC
PLATELET # BLD AUTO: 159 K/UL (ref 150–450)
PMV BLD AUTO: 10.9 FL (ref 9.2–12.9)
POTASSIUM SERPL-SCNC: 4.5 MMOL/L (ref 3.5–5.1)
PROT SERPL-MCNC: 7 GM/DL (ref 6–8.4)
PSA SERPL-MCNC: 0.74 NG/ML
RBC # BLD AUTO: 5.3 M/UL (ref 4.6–6.2)
RELATIVE EOSINOPHIL (OHS): 4.3 %
RELATIVE LYMPHOCYTE (OHS): 39.1 % (ref 18–48)
RELATIVE MONOCYTE (OHS): 11.6 % (ref 4–15)
RELATIVE NEUTROPHIL (OHS): 44.1 % (ref 38–73)
SODIUM SERPL-SCNC: 140 MMOL/L (ref 136–145)
TRIGL SERPL-MCNC: 170 MG/DL (ref 30–150)
WBC # BLD AUTO: 6.44 K/UL (ref 3.9–12.7)

## 2025-06-11 PROCEDURE — 84153 ASSAY OF PSA TOTAL: CPT

## 2025-06-11 PROCEDURE — 85025 COMPLETE CBC W/AUTO DIFF WBC: CPT

## 2025-06-11 PROCEDURE — 80061 LIPID PANEL: CPT

## 2025-06-11 PROCEDURE — 82040 ASSAY OF SERUM ALBUMIN: CPT

## 2025-06-11 PROCEDURE — 83036 HEMOGLOBIN GLYCOSYLATED A1C: CPT

## 2025-06-11 PROCEDURE — 36415 COLL VENOUS BLD VENIPUNCTURE: CPT | Mod: PO

## 2025-06-11 RX ORDER — AMMONIUM LACTATE 12 G/100G
LOTION TOPICAL
COMMUNITY
Start: 2025-01-27

## 2025-06-11 RX ORDER — MELOXICAM 15 MG/1
15 TABLET ORAL DAILY PRN
Qty: 30 TABLET | Refills: 3 | Status: SHIPPED | OUTPATIENT
Start: 2025-06-11

## 2025-06-11 RX ORDER — DULOXETIN HYDROCHLORIDE 30 MG/1
30 CAPSULE, DELAYED RELEASE ORAL DAILY
Qty: 30 CAPSULE | Refills: 3 | Status: SHIPPED | OUTPATIENT
Start: 2025-06-11 | End: 2026-06-11

## 2025-06-11 NOTE — PROGRESS NOTES
[facility] Note    Subjective:    The HPI and pertinent ROS is included in the Diagnostic Impression Remarks section at the end of the note. Please see below for further details. Chief complaint is at end of note.     Dustin is a pleasant intelligent patient who is here for evaluation.     Modified Medications    No medications on file       Data reviewed 274}  Previous medical records reviewed and summarized in plan section at end of note.      If you are due for any health screening(s) below please notify me so we can arrange them to be ordered and scheduled. Most healthy patients at your age complete them, but you are free to accept or refuse. If you can't do it, I'll definitely understand. If you can, I'd certainly appreciate it!     All of your core healthy metrics are met.      The following portions of the patient's history were reviewed and updated as appropriate: allergies, current medications, past family history, past medical history, past social history, past surgical history and problem list.    He  has a past medical history of Asthma, Colon polyp, and Personal history of colonic polyps (07/11/2018).  He  has a past surgical history that includes Colonoscopy; Colonoscopy (N/A, 03/27/2023); Sinus surgery; and Lipoma resection (Left, 5/1/2024).    He  reports that he has quit smoking. His smoking use included cigarettes. He has been exposed to tobacco smoke. He has never used smokeless tobacco. He reports that he does not currently use alcohol. He reports that he does not use drugs.  He family history includes Cancer in his mother; Diabetes in his father.    Review of patient's allergies indicates:   Allergen Reactions    Amoxicillin Diarrhea    Pollen, micronized Other (See Comments)     SNEEZING       Tobacco Use: Medium Risk (6/11/2025)    Patient History     Smoking Tobacco Use: Former     Smokeless Tobacco Use: Never     Passive Exposure: Past     Physical Examination  Physical Exam  "   General: No acute distress. Well-developed. Well-nourished.  Eyes: EOMI. Sclerae anicteric.  HENT: Normocephalic. Atraumatic. Nares patent. Moist oral mucosa.  Cardiovascular: Regular rate. Regular rhythm. No murmurs. No rubs. No gallops. Normal S1, S2.  Respiratory: Normal respiratory effort. Clear to auscultation bilaterally. No rales. No rhonchi. No wheezing.  Musculoskeletal: No  obvious deformity.  Extremities: No lower extremity edema.  Neurological: Alert & oriented x3. No slurred speech. Normal gait.  Psychiatric: Normal mood. Normal affect. Good insight. Good judgment.  Skin: Warm. Dry. No rash.              BP Readings from Last 3 Encounters:   06/11/25 122/72   03/14/25 (!) 150/93   09/20/24 129/81     Wt Readings from Last 3 Encounters:   06/11/25 82 kg (180 lb 12.4 oz)   03/18/25 80.3 kg (177 lb 0.5 oz)   03/14/25 80.3 kg (177 lb 0.5 oz)     /72 (BP Location: Right arm, Patient Position: Sitting)   Pulse 76   Temp 98 °F (36.7 °C) (Oral)   Resp 16   Ht 5' 7" (1.702 m)   Wt 82 kg (180 lb 12.4 oz)   SpO2 98%   BMI 28.31 kg/m²    274}  Laboratory: I have reviewed old labs below:    274}    Lab Results   Component Value Date    WBC 7.66 04/24/2024    HGB 15.0 04/24/2024    HCT 46.5 04/24/2024    MCV 88 04/24/2024     04/24/2024     06/10/2024    K 4.4 06/10/2024     06/10/2024    CALCIUM 9.7 06/10/2024    CO2 22 (L) 06/10/2024    BUN 14 06/10/2024    CREATININE 1.3 06/10/2024    EGFRNORACEVR >60.0 06/10/2024    ALBUMIN 3.9 06/17/2024    BILITOT 0.6 06/17/2024    ALKPHOS 75 06/17/2024    ALT 40 06/17/2024    AST 26 06/17/2024    CHOL 182 06/10/2024    TRIG 117 06/10/2024    HDL 39 (L) 06/10/2024    LDLCALC 119.6 06/10/2024    TSH 1.554 06/10/2024    PSA 0.47 06/10/2024    HGBA1C 5.7 (H) 06/10/2024      Lab reviewed by me: Particular labs of significance that I will monitor, workup, or treat to improve are mentioned below in diagnostic impression remarks.      Imaging/EKG: I " have reviewed the pertinent results and my findings are noted in remarks.  274}    CC:   Chief Complaint   Patient presents with    Annual Exam        274}    History of Present Illness    CHIEF COMPLAINT:  Patient presents today for neck pain    MUSCULOSKELETAL:  He reports dull neck pain radiating down left arm with associated new hand weakness. He has been taking Tylenol for pain management. MRI was performed in the past.    DEPRESSION:  He reports experiencing depression symptoms and denies suicidal ideation. He expresses openness to trying medication for management.      ROS:  Musculoskeletal: +limb pain, +muscle weakness  Psychiatric: +depression  Neck: +neck pain          Assessment/Plan  Dustin Owens is a 69 y.o. male who presents to clinic with:  1. Healthcare maintenance    2. Neck pain    3. Prediabetes    4. Mixed hyperlipidemia    5. Cervical disc disorder with radiculopathy    6. Fatigue, unspecified type    7. Encounter for prostate cancer screening    8. Abnormal finding of blood chemistry, unspecified    9. Moderate episode of recurrent major depressive disorder    10. Skin lesion       274}    Assessment & Plan    F33.1 Moderate episode of recurrent major depressive disorder  Z00.00 Healthcare maintenance  M54.2 Neck pain  R73.03 Prediabetes  E78.2 Mixed hyperlipidemia  M50.10 Cervical disc disorder with radiculopathy  R53.83 Fatigue, unspecified type  Z12.5 Encounter for prostate cancer screening  R79.9 Abnormal finding of blood chemistry, unspecified  L98.9 Skin lesion    PLAN SUMMARY:  - Initiated duloxetine for neck pain (off-label use) and major depression  - Ordered lab work for assessment  - Referred patient to Physical Therapy  - Patient to maintain regular diet  - Follow-up to evaluate effectiveness of duloxetine and review lab results    MODERATE EPISODE OF RECURRENT MAJOR DEPRESSIVE DISORDER:  - Patient has depression symptoms and is open to trying medication.  - Evaluated patient  who has no suicide plan.  - Initiated duloxetine for major depression, which may also provide pain benefits.    NECK PAIN:  - Improved neck pain management is required.  - Initiated duloxetine (off-label use) for neck pain.  - Referred the patient to Physical Therapy.    PREDIABETES:  - Monitoring the patient for prediabetes.  - Ordered lab work for assessment.    FATIGUE, UNSPECIFIED TYPE:  - Avoided tricyclic antidepressants (TCAs) due to their fatigue-inducing properties, which would be counterproductive given patient's current fatigue and depression.    LIFESTYLE CHANGES:  - Patient to monitor regular diet.             This note was generated with the assistance of ambient listening technology. Verbal consent was obtained by the patient and accompanying visitor(s) for the recording of patient appointment to facilitate this note. I attest to having reviewed and edited the generated note for accuracy, though some syntax or spelling errors may persist. Please contact the author of this note for any clarification.       1. Healthcare maintenance    2. Neck pain  - meloxicam (MOBIC) 15 MG tablet; Take 1 tablet (15 mg total) by mouth daily as needed for Pain.  Dispense: 30 tablet; Refill: 3  - Ambulatory Referral/Consult to Physical Therapy; Future  - DULoxetine (CYMBALTA) 30 MG capsule; Take 1 capsule (30 mg total) by mouth once daily.  Dispense: 30 capsule; Refill: 3    3. Prediabetes  - Hemoglobin A1C; Future    4. Mixed hyperlipidemia  - Lipid Panel; Future    5. Cervical disc disorder with radiculopathy    6. Fatigue, unspecified type    7. Encounter for prostate cancer screening  - PSA, Screening; Future    8. Abnormal finding of blood chemistry, unspecified  - CBC Auto Differential; Future  - Comprehensive Metabolic Panel; Future  - Hemoglobin A1C; Future  - Lipid Panel; Future    9. Moderate episode of recurrent major depressive disorder  - DULoxetine (CYMBALTA) 30 MG capsule; Take 1 capsule (30 mg total) by  mouth once daily.  Dispense: 30 capsule; Refill: 3    10. Skin lesion  - Ambulatory referral/consult to Dermatology; Future      Jimbo Howe MD   274}    If you are due for any health screening(s) below please notify me so we can arrange them to be ordered and scheduled. Most healthy patients at your age complete them, but you are free to accept or refuse.     If you can't do it, I'll definitely understand. If you can, I'd certainly appreciate it!   All of your core healthy metrics are met.

## 2025-06-12 ENCOUNTER — RESULTS FOLLOW-UP (OUTPATIENT)
Dept: FAMILY MEDICINE | Facility: CLINIC | Age: 70
End: 2025-06-12

## 2025-06-24 ENCOUNTER — OFFICE VISIT (OUTPATIENT)
Dept: FAMILY MEDICINE | Facility: CLINIC | Age: 70
End: 2025-06-24
Payer: MEDICARE

## 2025-06-24 VITALS
OXYGEN SATURATION: 99 % | BODY MASS INDEX: 28.58 KG/M2 | HEIGHT: 67 IN | TEMPERATURE: 98 F | SYSTOLIC BLOOD PRESSURE: 116 MMHG | HEART RATE: 81 BPM | WEIGHT: 182.13 LBS | DIASTOLIC BLOOD PRESSURE: 64 MMHG

## 2025-06-24 DIAGNOSIS — Z00.00 ENCOUNTER FOR MEDICARE ANNUAL WELLNESS EXAM: ICD-10-CM

## 2025-06-24 PROCEDURE — 99999 PR PBB SHADOW E&M-EST. PATIENT-LVL IV: CPT | Mod: PBBFAC,,, | Performed by: NURSE PRACTITIONER

## 2025-06-24 PROCEDURE — 99214 OFFICE O/P EST MOD 30 MIN: CPT | Mod: PBBFAC,PO | Performed by: NURSE PRACTITIONER

## 2025-06-24 RX ORDER — TRIAMCINOLONE ACETONIDE 1 MG/G
CREAM TOPICAL
COMMUNITY
Start: 2025-06-16

## 2025-06-24 NOTE — PROGRESS NOTES
"  Dustin Owens presented for a  Medicare AWV and comprehensive Health Risk Assessment today. The following components were reviewed and updated:    Medical history  Family History  Social history  Allergies and Current Medications  Health Risk Assessment  Health Maintenance  Care Team         ** See Completed Assessments for Annual Wellness Visit within the encounter summary.**         The following assessments were completed:  Living Situation  CAGE  Depression Screening  Timed Get Up and Go  Whisper Test  Cognitive Function Screening  Nutrition Screening  ADL Screening  PAQ Screening    Clock in media   Opioid documentation:      Patient does not have a current opioid prescription.        Vitals:    06/24/25 1312   BP: 116/64   Pulse: 81   Temp: 98.1 °F (36.7 °C)   TempSrc: Oral   SpO2: 99%   Weight: 82.6 kg (182 lb 1.6 oz)   Height: 5' 7" (1.702 m)     Body mass index is 28.52 kg/m².  Physical Exam  Constitutional:       Appearance: He is well-developed.   HENT:      Head: Normocephalic and atraumatic.      Right Ear: Hearing normal.      Left Ear: Hearing normal.      Nose: Nose normal.   Eyes:      General: Lids are normal.      Conjunctiva/sclera: Conjunctivae normal.      Pupils: Pupils are equal, round, and reactive to light.   Cardiovascular:      Rate and Rhythm: Normal rate.   Pulmonary:      Effort: Pulmonary effort is normal.   Abdominal:      Palpations: Abdomen is soft.   Musculoskeletal:         General: Normal range of motion.      Cervical back: Normal range of motion and neck supple.   Skin:     General: Skin is warm and dry.   Neurological:      Mental Status: He is alert and oriented to person, place, and time.               Diagnoses and health risks identified today and associated recommendations/orders:    1. Encounter for Medicare annual wellness exam  Discussed health maintenance guidelines appropriate for age.      - Referral to Enhanced Annual Wellness Visit (eAWV) W+1      Provided " Dustin with a 5-10 year written screening schedule and personal prevention plan. Recommendations were developed using the USPSTF age appropriate recommendations. Education, counseling, and referrals were provided as needed. After Visit Summary printed and given to patient which includes a list of additional screenings\tests needed.    Follow up for One year for Annual Wellness Visit.    Jenna Ruano, NP  I offered to discuss advanced care planning, including how to pick a person who would make decisions for you if you were unable to make them for yourself, called a health care power of , and what kind of decisions you might make such as use of life sustaining treatments such as ventilators and tube feeding when faced with a life limiting illness recorded on a living will that they will need to know. (How you want to be cared for as you near the end of your natural life)     X Patient is interested in learning more about how to make advanced directives.  I provided them paperwork and offered to discuss this with them.

## 2025-06-24 NOTE — PATIENT INSTRUCTIONS
Counseling and Referral of Other Preventative  (Italic type indicates deductible and co-insurance are waived)    Patient Name: Dustin Owens  Today's Date: 6/24/2025    Health Maintenance       Date Due Completion Date    COVID-19 Vaccine (5 - 2024-25 season) 09/01/2024 11/20/2023    Influenza Vaccine (Season Ended) 09/01/2025 9/23/2023    PROSTATE-SPECIFIC ANTIGEN 06/11/2026 6/11/2025    Override on 5/14/2021: Done    Hemoglobin A1c (Prediabetes) 06/11/2026 6/11/2025    TETANUS VACCINE 09/22/2027 9/22/2017    Colorectal Cancer Screening 03/27/2028 3/27/2023    Lipid Panel 06/11/2030 6/11/2025    Override on 5/14/2021: Done    RSV Vaccine (Age 60+ and Pregnant patients) (1 - 1-dose 75+ series) 07/22/2032 ---        No orders of the defined types were placed in this encounter.      The following information is provided to all patients.  This information is to help you find resources for any of the problems found today that may be affecting your health:                  Living healthy guide: www.[facility].[place].gov      Understanding Diabetes: www.diabetes.org      Eating healthy: www.cdc.gov/healthyweight      CDC home safety checklist: www.cdc.gov/steadi/patient.html      Agency on Aging: www.goea.[place].[facility]      Alcoholics anonymous (AA): www.aa.org      Physical Activity: www.esther.nih.gov/yl1iact      Tobacco use: www.quitwithusla.org

## 2025-08-21 ENCOUNTER — OFFICE VISIT (OUTPATIENT)
Dept: URGENT CARE | Facility: CLINIC | Age: 70
End: 2025-08-21
Payer: MEDICARE

## 2025-08-21 VITALS
OXYGEN SATURATION: 98 % | SYSTOLIC BLOOD PRESSURE: 141 MMHG | TEMPERATURE: 98 F | BODY MASS INDEX: 27.31 KG/M2 | HEART RATE: 73 BPM | HEIGHT: 67 IN | WEIGHT: 174 LBS | RESPIRATION RATE: 20 BRPM | DIASTOLIC BLOOD PRESSURE: 84 MMHG

## 2025-08-21 DIAGNOSIS — F41.9 ANXIETY: Primary | ICD-10-CM

## 2025-08-21 DIAGNOSIS — R03.0 ELEVATED BLOOD PRESSURE READING WITHOUT DIAGNOSIS OF HYPERTENSION: ICD-10-CM

## 2025-08-21 PROCEDURE — 99214 OFFICE O/P EST MOD 30 MIN: CPT | Mod: S$GLB,,,

## (undated) DEVICE — APPLICATOR CHLORAPREP ORN 26ML

## (undated) DEVICE — YANKAUER FLEX NO VENT HI CAP

## (undated) DEVICE — BNDG COFLEX FOAM LF2 ST 4X5YD

## (undated) DEVICE — SUT CTD VICRYL 0 UND BR

## (undated) DEVICE — GOWN POLY REINF BRTH SLV XL

## (undated) DEVICE — DRAPE STERI U-SHAPED 47X51IN

## (undated) DEVICE — ELECTRODE REM PLYHSV RETURN 9

## (undated) DEVICE — DRAPE THREE-QTR REINF 53X77IN

## (undated) DEVICE — GLOVE SENSICARE PI ALOE 8

## (undated) DEVICE — GOWN POLY REINF BRTH SLV 2XL

## (undated) DEVICE — SOCKINETTE IMPERVIOUS 12X48IN

## (undated) DEVICE — STRAP OR TABLE 5IN X 72IN

## (undated) DEVICE — DRAPE U SPLIT SHEET 54X76IN

## (undated) DEVICE — DRAPE MINOR FEN 98X77X121IN

## (undated) DEVICE — SUT MONOCRYL 3-0 PS-1

## (undated) DEVICE — SYR 10CC LUER LOCK

## (undated) DEVICE — POSITIONER PINKPROTECT ARC MED

## (undated) DEVICE — GLOVE SENSICARE PI GRN 8.5

## (undated) DEVICE — GLOVE SENSICARE PI ORTHO 7.5

## (undated) DEVICE — SLEEVE SCD EXPRESS KNEE MEDIUM

## (undated) DEVICE — DRAPE STERI INSTRUMENT 1018

## (undated) DEVICE — ALCOHOL 70% ANTISEPTIC ISO 4OZ

## (undated) DEVICE — NDL ECLIPSE SAF REG 25GX1.5IN

## (undated) DEVICE — SUT 2/0 18IN COATED VICRYL

## (undated) DEVICE — SLING ORTHOPEDIC LARGE

## (undated) DEVICE — ADHESIVE DERMABOND ADVANCED

## (undated) DEVICE — SPONGE BULKEE II ABSRB 6X6.75

## (undated) DEVICE — PACK CUSTOM UNIV BASIN SLI

## (undated) DEVICE — GLOVE SENSICARE PI ALOE 7

## (undated) DEVICE — DRAPE ORTH SPLIT 77X108IN

## (undated) DEVICE — TOWEL OR DISP STRL BLUE 4/PK

## (undated) DEVICE — GLOVE SENSICARE PI GRN 8